# Patient Record
Sex: FEMALE | Race: WHITE | HISPANIC OR LATINO | Employment: FULL TIME | URBAN - METROPOLITAN AREA
[De-identification: names, ages, dates, MRNs, and addresses within clinical notes are randomized per-mention and may not be internally consistent; named-entity substitution may affect disease eponyms.]

---

## 2017-01-04 ENCOUNTER — TELEPHONE (OUTPATIENT)
Dept: MEDICAL GROUP | Facility: PHYSICIAN GROUP | Age: 47
End: 2017-01-04

## 2017-01-04 NOTE — TELEPHONE ENCOUNTER
Requested medical records regarding retinal exam from Dr Steven chopra. This was verbally requested on 1/4/2017 9:51 AM to: [phone]    Requested medical records regarding pap from Qiana Segovia PA-C. This was requested verbally on 1/4/2017 10:02 AM to: [phone]

## 2017-01-06 ENCOUNTER — HOSPITAL ENCOUNTER (OUTPATIENT)
Dept: LAB | Facility: MEDICAL CENTER | Age: 47
End: 2017-01-06
Attending: NURSE PRACTITIONER
Payer: COMMERCIAL

## 2017-01-06 DIAGNOSIS — R70.0 ELEVATED SED RATE: ICD-10-CM

## 2017-01-06 DIAGNOSIS — E11.9 CONTROLLED TYPE 2 DIABETES MELLITUS WITHOUT COMPLICATION, WITHOUT LONG-TERM CURRENT USE OF INSULIN (HCC): ICD-10-CM

## 2017-01-06 DIAGNOSIS — R68.2 DRY MOUTH: ICD-10-CM

## 2017-01-06 LAB
CREAT UR-MCNC: 204.9 MG/DL
CRP SERPL HS-MCNC: 0.84 MG/DL (ref 0–0.75)
ERYTHROCYTE [SEDIMENTATION RATE] IN BLOOD BY WESTERGREN METHOD: 33 MM/HOUR (ref 0–20)
MICROALBUMIN UR-MCNC: 0.9 MG/DL
MICROALBUMIN/CREAT UR: 4 MG/G (ref 0–30)

## 2017-01-06 PROCEDURE — 86038 ANTINUCLEAR ANTIBODIES: CPT

## 2017-01-06 PROCEDURE — 82570 ASSAY OF URINE CREATININE: CPT

## 2017-01-06 PROCEDURE — 82043 UR ALBUMIN QUANTITATIVE: CPT

## 2017-01-06 PROCEDURE — 86140 C-REACTIVE PROTEIN: CPT

## 2017-01-06 PROCEDURE — 86235 NUCLEAR ANTIGEN ANTIBODY: CPT

## 2017-01-06 PROCEDURE — 36415 COLL VENOUS BLD VENIPUNCTURE: CPT

## 2017-01-06 PROCEDURE — 85652 RBC SED RATE AUTOMATED: CPT

## 2017-01-06 PROCEDURE — 86225 DNA ANTIBODY NATIVE: CPT

## 2017-01-09 ENCOUNTER — OFFICE VISIT (OUTPATIENT)
Dept: MEDICAL GROUP | Facility: PHYSICIAN GROUP | Age: 47
End: 2017-01-09
Payer: COMMERCIAL

## 2017-01-09 VITALS
HEART RATE: 88 BPM | BODY MASS INDEX: 41.66 KG/M2 | HEIGHT: 64 IN | DIASTOLIC BLOOD PRESSURE: 74 MMHG | TEMPERATURE: 98.1 F | SYSTOLIC BLOOD PRESSURE: 112 MMHG | OXYGEN SATURATION: 96 % | WEIGHT: 244 LBS | RESPIRATION RATE: 14 BRPM

## 2017-01-09 DIAGNOSIS — E11.9 CONTROLLED TYPE 2 DIABETES MELLITUS WITHOUT COMPLICATION, WITHOUT LONG-TERM CURRENT USE OF INSULIN (HCC): ICD-10-CM

## 2017-01-09 DIAGNOSIS — E55.9 VITAMIN D DEFICIENCY: ICD-10-CM

## 2017-01-09 DIAGNOSIS — R79.82 ELEVATED C-REACTIVE PROTEIN (CRP): ICD-10-CM

## 2017-01-09 DIAGNOSIS — R70.0 ELEVATED SED RATE: ICD-10-CM

## 2017-01-09 PROCEDURE — 99214 OFFICE O/P EST MOD 30 MIN: CPT | Performed by: NURSE PRACTITIONER

## 2017-01-09 ASSESSMENT — PATIENT HEALTH QUESTIONNAIRE - PHQ9: CLINICAL INTERPRETATION OF PHQ2 SCORE: 0

## 2017-01-09 NOTE — PROGRESS NOTES
Subjective:     Chief Complaint   Patient presents with   • Diabetes   • Results     review labs         Radha Marlow is a 46 y.o. female here today for evaluation and management of:    Forgot to visit lab, got sick with URI again and felt better for a few days prior to visiting lab. crp and sed rate elevated, sed rate lower than previous level. + SUMMER, titer and reflex pending, call to lab - results for titer now included in reflex and results can take up to one week. Quite active but continues to have joint pain in knees, lower back. Follows with chiropractic.    Controlled type 2 diabetes mellitus without complication, without long-term current use of insulin (Formerly [facility])  A1c decreased to 5.8. Managed with metformin  mg 2 po bid.     Vitamin D deficiency   vitamin d supplement 5000 iu daily.         ROS   Denies HA, chest pain, shortness of breath, abdominal pain, bladder or bowel changes, lower extremity edema.    Current medicines (including changes today)  Current Outpatient Prescriptions   Medication Sig Dispense Refill   • ONE TOUCH ULTRA TEST strip USE 4 TIMES A DAY AND AS NEEDED FOR HIGH OR LOW BLOOD SUGAR  3   • atorvastatin (LIPITOR) 20 MG Tab Take 1 Tab by mouth every day. 90 Tab 1   • metformin ER (GLUCOPHAGE XR) 500 MG TABLET SR 24 HR   0   • Blood Glucose Monitoring Suppl Device Meter: Dispense insurance specific meter. Sig. Use as directed for blood sugar monitoring. #1. NR. DX: E11.9 1 Device 0   • Lancets Misc Lancets order: Lancets for insurance specific meter. Sig: use qid and prn ssx high or low sugar. #100 RF x 3 DX: E11.9 100 Each 3   • Blood Glucose Monitoring Suppl SUPPLIES Misc Test strips order: Test strips for insurance specific meter. Sig: use qid and prn ssx high or low sugar #100 RF x 3. DX: E11.9 100 Each 3   • Multiple Vitamin (DAILY VITAMINS PO) Take  by mouth.     • fluticasone (FLONASE) 50 MCG/ACT nasal spray Spray 1 Spray in nose 2 times a day. Each Nostril 1 Bottle 3  "  • guaifenesin-codeine (ROBITUSSIN AC) Solution oral solution Take 10 mL by mouth every 6 hours as needed for Cough. Drink plenty of water. Do not drink alcohol, drive, or operate machinery while taking. 180 mL 0   • BLISOVI FE 1/20 1-20 MG-MCG per tablet 1 Tab every day.  3   • Cholecalciferol (VITAMIN D) 2000 UNITS Cap Take  by mouth.       No current facility-administered medications for this visit.       She  has a past medical history of Hypertriglyceridemia (3/17/2010); Allergy; Anemia; Arthritis; and Hyperlipidemia. She also has no past medical history of Breast cancer (Formerly [facility]).    Allergies Bactrim and No known drug allergy    Current medications, problem list, allergies, past medical history, and tobacco use history reviewed in [employer] today.    Health maintenance reviewed and updated.    Objective:   Blood pressure 112/74, pulse 88, temperature 36.7 °C (98.1 °F), resp. rate 14, height 1.613 m (5' 3.5\"), weight 110.678 kg (244 lb), last menstrual period 12/19/2016, SpO2 96 %. Body mass index is 42.54 kg/(m^2).     Physical Exam   Constitutional: Alert, no acute distress. Pleasant and cooperative with the examination.  Skin:   Warm, dry, no rashes in visible areas.    Eyes:   Pupils equal, round. Conjunctiva and sclera clear,    Lids normal.  ENT:  Pinna normal.   Neck:   Supple, trachea midline.  Lungs:  Normal effort and respirations. Clear to auscultation bilaterally.  CV:  Regular rate and rhythm.  MS/Ext:  Steady gait, no edema.  Psych:  Eye contact is good, affect calm.    Assessment and Plan:   The following treatment plan was discussed    1. Controlled type 2 diabetes mellitus without complication, without long-term current use of insulin (Formerly [facility])  Stable. Continue current medicines. Monitor labs regularly.        2. Vitamin D deficiency  Continue supplement, will continue to monitor.    3. BMI 40.0-44.9, adult (Formerly [facility])  Counseled regarding lifestyle modifications.     4. Elevated sed rate  Less elevated now. " She'll visit lab to repeat the test when she is feeling well.  - WESTERGREN SED RATE; Future      5. Elevated C-reactive protein (CRP)  New. Repeat labs.  - CRP QUANTITIVE (NON-CARDIAC); Future  Followup: Return in about 1 month (around 2/9/2017).  As scheduled, sooner if symptoms don't resolve or with any new problems.       Reviewed side effects, risks, and benefits of medications prescribed today.  Advised to take all medications as instructed and report any side effects.   The patient voices understanding and agrees.  Report any new or worsening symptoms.  Have labs or other diagnostic studies prior to follow up.  Keep all appointments for any referrals given.      Please note this dictation was created using voice recognition software. Every reasonable attempt has been made to correct obvious errors, however there may be errors of grammar and possibly content that were not discovered before finalizing the note.

## 2017-01-09 NOTE — MR AVS SNAPSHOT
"Radha Marlow   2017 9:55 AM   Office Visit   MRN: [id number]    Department:  [facility]   Dept Phone:  [phone]    Description:  Female : 1970   Provider:  SAMY Roach           Reason for Visit     Diabetes     Results review labs       Allergies as of 2017     Allergen Noted Reactions    Bactrim [Sulfamethoxazole W-Trimethoprim] 2016   Unspecified    Rash     No Known Drug Allergy 2007         Vital Signs     Blood Pressure Pulse Temperature Respirations Height Weight    112/74 mmHg 88 36.7 °C (98.1 °F) 14 1.613 m (5' 3.5\") 110.678 kg (244 lb)    Body Mass Index Oxygen Saturation Last Menstrual Period Smoking Status          42.54 kg/m2 96% 2016 Former Smoker        Basic Information     Date Of Birth Sex Race Ethnicity Preferred Language    1970 Female White  Origin ([place]ian,[place]e,[place]ian,[place] citizen, etc) English      Problem List              ICD-10-CM Priority Class Noted - Resolved    Hypertriglyceridemia E78.1   3/17/2010 - Present    PCOS (polycystic ovarian syndrome) E28.2   2012 - Present    Hyperlipidemia associated with type 2 diabetes mellitus (HCC) E11.69, E78.5   3/1/2012 - Present    Controlled type 2 diabetes mellitus without complication, without long-term current use of insulin (HCC) E11.9   2016 - Present    Vitamin D deficiency E55.9   2016 - Present    BMI 40.0-44.9, adult (HCC) Z68.41   2016 - Present    Rosacea L71.9   2016 - Present      Health Maintenance        Date Due Completion Dates    IMM HEP B VACCINE (1 of 3 - Primary Series) 1970 ---    IMM PNEUMOCOCCAL 19-64 (ADULT) MEDIUM RISK SERIES (1 of 1 - PPSV23) 10/3/1990 ---    PAP SMEAR 2016, 2012    IMM INFLUENZA (1) 2016 ---    RETINAL SCREENING 2016    A1C SCREENING 2017 10/6/2016, 2016, 2016, 2015, 2014, 2013, 10/1/2012, 2012    MAMMOGRAM " 4/11/2017 4/11/2016, 3/18/2013, 3/9/2012, 6/15/2007, 6/15/2007    FASTING LIPID PROFILE 6/30/2017 6/30/2016, 4/11/2016, 4/6/2015, 11/1/2013, 4/2/2012, 2/23/2012, 3/9/2010    SERUM CREATININE 10/4/2017 10/4/2016, 8/31/2016, 8/29/2016, 6/30/2016, 4/11/2016, 4/6/2015, 1/31/2014, 11/1/2013, 4/2/2012, 2/23/2012, 3/9/2010    DIABETES MONOFILAMTENT / LE EXAM 10/6/2017 10/6/2016, 7/7/2016    URINE ACR / MICROALBUMIN 1/6/2018 1/6/2017, 6/30/2016, 2/23/2012    IMM DTaP/Tdap/Td Vaccine (2 - Td) 5/28/2024 5/28/2014            Current Immunizations     MMR Vaccine 7/29/2009    Tdap Vaccine 5/28/2014      Below and/or attached are the medications your provider expects you to take. Review all of your home medications and newly ordered medications with your provider and/or pharmacist. Follow medication instructions as directed by your provider and/or pharmacist. Please keep your medication list with you and share with your provider. Update the information when medications are discontinued, doses are changed, or new medications (including over-the-counter products) are added; and carry medication information at all times in the event of emergency situations     Allergies:  BACTRIM - Unspecified     NO KNOWN DRUG ALLERGY - (reactions not documented)               Medications  Valid as of: January 09, 2017 - 10:03 AM    Generic Name Brand Name Tablet Size Instructions for use    Atorvastatin Calcium (Tab) LIPITOR 20 MG Take 1 Tab by mouth every day.        Blood Glucose Monitoring Suppl (Device) Blood Glucose Monitoring Suppl  Meter: Dispense insurance specific meter. Sig. Use as directed for blood sugar monitoring. #1. NR. DX: E11.9        Blood Glucose Monitoring Suppl (Misc) Blood Glucose Monitoring Suppl SUPPLIES Test strips order: Test strips for insurance specific meter. Sig: use qid and prn ssx high or low sugar #100 RF x 3. DX: E11.9        Cholecalciferol (Cap) Vitamin D 2000 UNITS Take  by mouth.        Fluticasone  Propionate (Suspension) FLONASE 50 MCG/ACT Spray 1 Spray in nose 2 times a day. Each Nostril        Glucose Blood (Strip) ONE TOUCH ULTRA TEST  USE 4 TIMES A DAY AND AS NEEDED FOR HIGH OR LOW BLOOD SUGAR        Guaifenesin-Codeine (Solution) ROBITUSSIN -10 mg/5mL Take 10 mL by mouth every 6 hours as needed for Cough. Drink plenty of water. Do not drink alcohol, drive, or operate machinery while taking.        Lancets (Misc) Lancets  Lancets order: Lancets for insurance specific meter. Sig: use qid and prn ssx high or low sugar. #100 RF x 3 DX: E11.9        MetFORMIN HCl (TABLET SR 24 HR) GLUCOPHAGE  MG         Multiple Vitamin   Take  by mouth.        Norethin Ace-Eth Estrad-FE (Tab) BLISOVI FE 1/20 1-20 MG-MCG 1 Tab every day.        .                 Medicines prescribed today were sent to:     [facility]ACY #[id number] - [place] - [address]    [address] [place] [address]    Phone: [phone] Fax: [phone]    Open 24 Hours?: No      Medication refill instructions:       If your prescription bottle indicates you have medication refills left, it is not necessary to call your provider’s office. Please contact your pharmacy and they will refill your medication.    If your prescription bottle indicates you do not have any refills left, you may request refills at any time through one of the following ways: The online [employer] system (except Urgent Care), by calling your provider’s office, or by asking your pharmacy to contact your provider’s office with a refill request. Medication refills are processed only during regular business hours and may not be available until the next business day. Your provider may request additional information or to have a follow-up visit with you prior to refilling your medication.   *Please Note: Medication refills are assigned a new Rx number when refilled electronically. Your pharmacy may indicate that no refills were authorized even though a new prescription for the same  medication is available at the pharmacy. Please request the medicine by name with the pharmacy before contacting your provider for a refill.           MyChart Access Code: Activation code not generated  Current MyChart Status: Active

## 2017-01-10 LAB
DSDNA AB TITR SER CLIF: ABNORMAL {TITER}
ENA SM IGG SER-ACNC: 0 AU/ML (ref 0–40)
ENA SS-B IGG SER IA-ACNC: 0 AU/ML (ref 0–40)
NUCLEAR IGG SER QL IA: DETECTED
NUCLEAR IGG TITR SER IF: ABNORMAL {TITER}
SSA52 R0ENA AB IGG Q0420: 17 AU/ML (ref 0–40)
SSA60 R0ENA AB IGG Q0419: 1 AU/ML (ref 0–40)
U1 SNRNP IGG SER QL: 0 AU/ML (ref 0–40)

## 2017-01-11 DIAGNOSIS — E55.9 VITAMIN D DEFICIENCY: ICD-10-CM

## 2017-01-19 RX ORDER — ATORVASTATIN CALCIUM 20 MG/1
TABLET, FILM COATED ORAL
Qty: 90 TAB | Refills: 0 | Status: SHIPPED | OUTPATIENT
Start: 2017-01-19 | End: 2017-04-22 | Stop reason: SDUPTHER

## 2017-01-19 NOTE — TELEPHONE ENCOUNTER
Was the patient seen in the last year in this department? Yes     Does patient have an active prescription for medications requested? Yes     Received Request Via: Pharmacy      Pt met protocol?: Yes    LAST OV 01/09/17    LAST LIPID PROFILE 06/30/16    BP Readings from Last 1 Encounters:   01/09/17 112/74

## 2017-01-19 NOTE — TELEPHONE ENCOUNTER
Pt has had OV within the 12 month protocol and lipid panel is current from 6/16.3 month supply sent to pharmacy.   Lab Results   Component Value Date/Time    CHOLESTEROL, 06/30/2016 09:30 AM    LDL 71 06/30/2016 09:30 AM    HDL 44 06/30/2016 09:30 AM    TRIGLYCERIDES 156* 06/30/2016 09:30 AM       Lab Results   Component Value Date/Time    SODIUM 135 10/04/2016 10:56 AM    POTASSIUM 4.0 10/04/2016 10:56 AM    CHLORIDE 103 10/04/2016 10:56 AM    CO2 24 10/04/2016 10:56 AM    GLUCOSE 125* 10/04/2016 10:56 AM    BUN 15 10/04/2016 10:56 AM    CREATININE 0.75 10/04/2016 10:56 AM    BUN-CREATININE RATIO 23 03/09/2010 07:15 AM    GLOM FILT RATE, EST >59 03/09/2010 07:15 AM     Lab Results   Component Value Date/Time    ALKALINE PHOSPHATASE 47 10/04/2016 10:56 AM    AST(SGOT) 35 10/04/2016 10:56 AM    ALT(SGPT) 43 10/04/2016 10:56 AM    TOTAL BILIRUBIN 0.4 10/04/2016 10:56 AM

## 2017-01-26 ENCOUNTER — HOSPITAL ENCOUNTER (OUTPATIENT)
Dept: LAB | Facility: MEDICAL CENTER | Age: 47
End: 2017-01-26
Attending: NURSE PRACTITIONER
Payer: COMMERCIAL

## 2017-01-26 DIAGNOSIS — E55.9 VITAMIN D DEFICIENCY: ICD-10-CM

## 2017-01-26 DIAGNOSIS — R79.82 ELEVATED C-REACTIVE PROTEIN (CRP): ICD-10-CM

## 2017-01-26 DIAGNOSIS — R70.0 ELEVATED SED RATE: ICD-10-CM

## 2017-01-26 LAB
25(OH)D3 SERPL-MCNC: 13 NG/ML (ref 30–100)
CRP SERPL HS-MCNC: 0.72 MG/DL (ref 0–0.75)
ERYTHROCYTE [SEDIMENTATION RATE] IN BLOOD BY WESTERGREN METHOD: 38 MM/HOUR (ref 0–20)

## 2017-01-26 PROCEDURE — 82306 VITAMIN D 25 HYDROXY: CPT

## 2017-01-26 PROCEDURE — 36415 COLL VENOUS BLD VENIPUNCTURE: CPT

## 2017-01-26 PROCEDURE — 86140 C-REACTIVE PROTEIN: CPT

## 2017-01-26 PROCEDURE — 85652 RBC SED RATE AUTOMATED: CPT

## 2017-03-14 ENCOUNTER — OFFICE VISIT (OUTPATIENT)
Dept: MEDICAL GROUP | Facility: PHYSICIAN GROUP | Age: 47
End: 2017-03-14
Payer: COMMERCIAL

## 2017-03-14 VITALS
BODY MASS INDEX: 42.17 KG/M2 | DIASTOLIC BLOOD PRESSURE: 76 MMHG | RESPIRATION RATE: 16 BRPM | WEIGHT: 247 LBS | HEIGHT: 64 IN | HEART RATE: 80 BPM | TEMPERATURE: 98 F | SYSTOLIC BLOOD PRESSURE: 116 MMHG | OXYGEN SATURATION: 96 %

## 2017-03-14 DIAGNOSIS — E28.2 PCOS (POLYCYSTIC OVARIAN SYNDROME): ICD-10-CM

## 2017-03-14 DIAGNOSIS — R70.0 ELEVATED SED RATE: ICD-10-CM

## 2017-03-14 DIAGNOSIS — E11.9 CONTROLLED TYPE 2 DIABETES MELLITUS WITHOUT COMPLICATION, WITHOUT LONG-TERM CURRENT USE OF INSULIN (HCC): ICD-10-CM

## 2017-03-14 DIAGNOSIS — L71.9 ROSACEA: ICD-10-CM

## 2017-03-14 DIAGNOSIS — R76.8 POSITIVE ANA (ANTINUCLEAR ANTIBODY): ICD-10-CM

## 2017-03-14 DIAGNOSIS — E55.9 VITAMIN D DEFICIENCY: ICD-10-CM

## 2017-03-14 PROCEDURE — 99214 OFFICE O/P EST MOD 30 MIN: CPT | Performed by: NURSE PRACTITIONER

## 2017-03-14 RX ORDER — ERGOCALCIFEROL 1.25 MG/1
50000 CAPSULE ORAL
Qty: 12 CAP | Refills: 0 | Status: SHIPPED | OUTPATIENT
Start: 2017-03-14 | End: 2017-06-28

## 2017-03-14 NOTE — MR AVS SNAPSHOT
"        Radha Marlow   3/14/2017 9:15 AM   Office Visit   MRN: [id number]    Department:  [facility]   Dept Phone:  [phone]    Description:  Female : 1970   Provider:  SAMY Roach           Reason for Visit     Results review lab work       Allergies as of 3/14/2017     Allergen Noted Reactions    Bactrim [Sulfamethoxazole W-Trimethoprim] 2016   Unspecified    Rash     No Known Drug Allergy 2007         You were diagnosed with     Elevated sed rate   [185343]       Positive SUMMER (antinuclear antibody)   [316326]       PCOS (polycystic ovarian syndrome)   [202994]       Vitamin D deficiency   [5690920]         Vital Signs     Blood Pressure Pulse Temperature Respirations Height Weight    116/76 mmHg 80 36.7 °C (98 °F) 16 1.613 m (5' 3.5\") 112.038 kg (247 lb)    Body Mass Index Oxygen Saturation Last Menstrual Period Smoking Status          43.06 kg/m2 96% 2017 Former Smoker        Basic Information     Date Of Birth Sex Race Ethnicity Preferred Language    1970 Female White  Origin (Angolan,[place],Eth[place]ian,[place], etc) English      Problem List              ICD-10-CM Priority Class Noted - Resolved    Hypertriglyceridemia E78.1   3/17/2010 - Present    PCOS (polycystic ovarian syndrome) E28.2   2012 - Present    Hyperlipidemia associated with type 2 diabetes mellitus (CMS-HCC) E11.69, E78.5   3/1/2012 - Present    Controlled type 2 diabetes mellitus without complication, without long-term current use of insulin (CMS-HCC) E11.9   2016 - Present    Vitamin D deficiency E55.9   2016 - Present    BMI 40.0-44.9, adult (CMS-HCC) Z68.41   2016 - Present    Rosacea L71.9   2016 - Present      Health Maintenance        Date Due Completion Dates    IMM HEP B VACCINE (1 of 3 - Primary Series) 1970 ---    IMM PNEUMOCOCCAL 19-64 (ADULT) MEDIUM RISK SERIES (1 of 1 - PPSV23) 10/3/1990 ---    PAP SMEAR 2016, " 2/22/2012    IMM INFLUENZA (1) 9/1/2016 ---    RETINAL SCREENING 12/11/2016 12/11/2015    A1C SCREENING 4/6/2017 10/6/2016, 6/30/2016, 4/11/2016, 6/2/2015, 1/31/2014, 11/1/2013, 10/1/2012, 2/23/2012    MAMMOGRAM 4/11/2017 4/11/2016, 3/18/2013, 3/9/2012, 6/15/2007, 6/15/2007    FASTING LIPID PROFILE 6/30/2017 6/30/2016, 4/11/2016, 4/6/2015, 11/1/2013, 4/2/2012, 2/23/2012, 3/9/2010    SERUM CREATININE 10/4/2017 10/4/2016, 8/31/2016, 8/29/2016, 6/30/2016, 4/11/2016, 4/6/2015, 1/31/2014, 11/1/2013, 4/2/2012, 2/23/2012, 3/9/2010    DIABETES MONOFILAMENT / LE EXAM 10/6/2017 10/6/2016, 7/7/2016    URINE ACR / MICROALBUMIN 1/6/2018 1/6/2017, 6/30/2016, 2/23/2012    IMM DTaP/Tdap/Td Vaccine (2 - Td) 5/28/2024 5/28/2014            Current Immunizations     MMR Vaccine 7/29/2009    Tdap Vaccine 5/28/2014      Below and/or attached are the medications your provider expects you to take. Review all of your home medications and newly ordered medications with your provider and/or pharmacist. Follow medication instructions as directed by your provider and/or pharmacist. Please keep your medication list with you and share with your provider. Update the information when medications are discontinued, doses are changed, or new medications (including over-the-counter products) are added; and carry medication information at all times in the event of emergency situations     Allergies:  BACTRIM - Unspecified     NO KNOWN DRUG ALLERGY - (reactions not documented)               Medications  Valid as of: March 14, 2017 - 11:18 AM    Generic Name Brand Name Tablet Size Instructions for use    Atorvastatin Calcium (Tab) LIPITOR 20 MG TAKE 1 TAB BY MOUTH EVERY DAY.        Blood Glucose Monitoring Suppl (Device) Blood Glucose Monitoring Suppl  Meter: Dispense insurance specific meter. Sig. Use as directed for blood sugar monitoring. #1. NR. DX: E11.9        Blood Glucose Monitoring Suppl (Misc) Blood Glucose Monitoring Suppl SUPPLIES Test strips  order: Test strips for insurance specific meter. Sig: use qid and prn ssx high or low sugar #100 RF x 3. DX: E11.9        Cholecalciferol (Cap) Vitamin D 2000 UNITS Take  by mouth.        Ergocalciferol (Cap) DRISDOL 49888 UNITS Take 1 Cap by mouth every 7 days. Labs every three months, do not refill until after labs.        Fluticasone Propionate (Suspension) FLONASE 50 MCG/ACT Spray 1 Spray in nose 2 times a day. Each Nostril        Glucose Blood (Strip) ONE TOUCH ULTRA TEST  USE 4 TIMES A DAY AND AS NEEDED FOR HIGH OR LOW BLOOD SUGAR        Guaifenesin-Codeine (Solution) ROBITUSSIN -10 mg/5mL Take 10 mL by mouth every 6 hours as needed for Cough. Drink plenty of water. Do not drink alcohol, drive, or operate machinery while taking.        Lancets (Misc) Lancets  Lancets order: Lancets for insurance specific meter. Sig: use qid and prn ssx high or low sugar. #100 RF x 3 DX: E11.9        MetFORMIN HCl (TABLET SR 24 HR) GLUCOPHAGE  MG         Multiple Vitamin   Take  by mouth.        Norethin Ace-Eth Estrad-FE (Tab) BLISOVI FE 1/20 1-20 MG-MCG 1 Tab every day.        .                 Medicines prescribed today were sent to:     [facility]/PHARMACY #[id number] - [place] - [address]    [address] [place] [address]    Phone: [phone] Fax: [phone]    Open 24 Hours?: No      Medication refill instructions:       If your prescription bottle indicates you have medication refills left, it is not necessary to call your provider’s office. Please contact your pharmacy and they will refill your medication.    If your prescription bottle indicates you do not have any refills left, you may request refills at any time through one of the following ways: The online [employer] system (except Urgent Care), by calling your provider’s office, or by asking your pharmacy to contact your provider’s office with a refill request. Medication refills are processed only during regular business hours and may not be available until the  next business day. Your provider may request additional information or to have a follow-up visit with you prior to refilling your medication.   *Please Note: Medication refills are assigned a new Rx number when refilled electronically. Your pharmacy may indicate that no refills were authorized even though a new prescription for the same medication is available at the pharmacy. Please request the medicine by name with the pharmacy before contacting your provider for a refill.        Your To Do List     Future Labs/Procedures Complete By Expires    VITAMIN D,25 HYDROXY  6/12/2017 3/14/2018    SUMMER ANTIBODY WITH REFLEX  As directed 3/15/2018    SUMMER TITER  As directed 3/15/2018    Comments:    [facility] test code [id number]    CRP QUANTITIVE (NON-CARDIAC)  As directed 3/15/2018    WESTERGREN SED RATE  As directed 3/15/2018         MyChart Access Code: Activation code not generated  Current MONOQI Status: Active

## 2017-03-17 NOTE — PROGRESS NOTES
Subjective:     Chief Complaint   Patient presents with   • Results     review lab work         Radha Marlow is a 46 y.o. female here today for evaluation and management of:    Abnormal lab results:  + SUMMER titer 1:640 and intermittently elevated SED, CRP  SED. Initially discovered elevated SED In August while she was being treated with Bactrim by ophthalmologist for an eye infection. At that time her sedimentation rate was 63 and has been fluctuating since that time. More recently the SED level was measured at 33 in Early January Then increase to 38 later in January. CRP level was Slightly elevated at 0.84 in early January and decreased to 0.72. She continues to follow with gynecology for PC OS and dermatology for rosacea. Diabetes has been well controlled, A1c 5.8 in October.  Low vitamin D level at 13    ROS   Denies HA, chest pain, shortness of breath, abdominal pain, bladder or bowel changes, lower extremity edema.    Current medicines (including changes today)  Current Outpatient Prescriptions   Medication Sig Dispense Refill   • vitamin D, Ergocalciferol, (DRISDOL) 79676 UNITS Cap capsule Take 1 Cap by mouth every 7 days. Labs every three months, do not refill until after labs. 12 Cap 0   • atorvastatin (LIPITOR) 20 MG Tab TAKE 1 TAB BY MOUTH EVERY DAY. 90 Tab 0   • fluticasone (FLONASE) 50 MCG/ACT nasal spray Spray 1 Spray in nose 2 times a day. Each Nostril 1 Bottle 3   • ONE TOUCH ULTRA TEST strip USE 4 TIMES A DAY AND AS NEEDED FOR HIGH OR LOW BLOOD SUGAR  3   • BLISOVI FE 1/20 1-20 MG-MCG per tablet 1 Tab every day.  3   • Cholecalciferol (VITAMIN D) 2000 UNITS Cap Take  by mouth.     • metformin ER (GLUCOPHAGE XR) 500 MG TABLET SR 24 HR   0   • Blood Glucose Monitoring Suppl Device Meter: Dispense insurance specific meter. Sig. Use as directed for blood sugar monitoring. #1. NR. DX: E11.9 1 Device 0   • Lancets Misc Lancets order: Lancets for insurance specific meter. Sig: use qid and prn ssx  "high or low sugar. #100 RF x 3 DX: E11.9 100 Each 3   • Blood Glucose Monitoring Suppl SUPPLIES Misc Test strips order: Test strips for insurance specific meter. Sig: use qid and prn ssx high or low sugar #100 RF x 3. DX: E11.9 100 Each 3   • Multiple Vitamin (DAILY VITAMINS PO) Take  by mouth.       No current facility-administered medications for this visit.       She  has a past medical history of Hypertriglyceridemia (3/17/2010); Allergy; Anemia; Arthritis; and Hyperlipidemia. She also has no past medical history of Breast cancer (CMS-HCC).    Allergies Bactrim and No known drug allergy    Current medications, problem list, allergies, past medical history, and tobacco use history reviewed in [email or web address] today.    Health maintenance reviewed and updated.    Objective:   Blood pressure 116/76, pulse 80, temperature 36.7 °C (98 °F), resp. rate 16, height 1.613 m (5' 3.5\"), weight 112.038 kg (247 lb), last menstrual period 03/13/2017, SpO2 96 %. Body mass index is 43.06 kg/(m^2).     Physical Exam   Constitutional: Alert, no acute distress. Pleasant and cooperative with the examination.  Skin:   Warm, dry.    Eyes:   Pupils equal, round. Conjunctiva and sclera clear,    Lids normal.  ENT:  Pinna normal.  Neck:   Supple, trachea midline.  Lungs:  Normal effort and respirations. Clear to auscultation bilaterally.  CV:  Regular rate and rhythm.  MS/Ext:  Steady gait, no edema.  Psych:  Eye contact is good, affect calm.    Assessment and Plan:   The following treatment plan was discussed    1. Elevated APR - sed rate  Will repeat labs  - WESTERGREN SED RATE; Future  - CRP QUANTITIVE (NON-CARDIAC); Future    2. Positive SUMMER (antinuclear antibody)  Will repeat labs prior to referral to rheumatology.    - SUMMER ANTIBODY WITH REFLEX; Future    3. PCOS (polycystic ovarian syndrome)  Chronic. Continue current medications and follow up with specialist.    4. Vitamin D deficiency  New. Start weekly vitamin D supplement and repeat labs " in three months.  - VITAMIN D,25 HYDROXY; Future    5. Rosacea  Chronic. Continue follow-up with dermatology.    6. Diabetes   A1c ordered to be done with labs.  Followup: Return in about 3 months (around 6/14/2017).  As scheduled, sooner if symptoms don't resolve or with any new problems.           Reviewed side effects, risks, and benefits of medications prescribed today.  Advised to take all medications as instructed and report any side effects.   The patient voices understanding and agrees.  Report any new or worsening symptoms.  Have labs or other diagnostic studies prior to follow up.  Keep all appointments for any referrals given.      Please note this dictation was created using voice recognition software. Every reasonable attempt has been made to correct obvious errors, however there may be errors of grammar and possibly content that were not discovered before finalizing the note.

## 2017-04-22 DIAGNOSIS — E78.1 HYPERTRIGLYCERIDEMIA: ICD-10-CM

## 2017-04-24 RX ORDER — ATORVASTATIN CALCIUM 20 MG/1
TABLET, FILM COATED ORAL
Qty: 90 TAB | Refills: 0 | Status: SHIPPED | OUTPATIENT
Start: 2017-04-24 | End: 2017-08-19 | Stop reason: SDUPTHER

## 2017-04-24 NOTE — TELEPHONE ENCOUNTER
Please advise pt she is due for lipid labs as well as others that are pending from PCP. Ordered lipid and GFR.

## 2017-04-24 NOTE — TELEPHONE ENCOUNTER
Was the patient seen in the last year in this department? Yes     Does patient have an active prescription for medications requested? No     Received Request Via: Pharmacy      Pt met protocol?: Yes, LIPID LABS 4/16 OV 3/17

## 2017-05-04 ENCOUNTER — HOSPITAL ENCOUNTER (OUTPATIENT)
Dept: RADIOLOGY | Facility: MEDICAL CENTER | Age: 47
End: 2017-05-04
Attending: SPECIALIST
Payer: COMMERCIAL

## 2017-05-04 DIAGNOSIS — Z13.9 SCREENING: ICD-10-CM

## 2017-05-04 PROCEDURE — G0202 SCR MAMMO BI INCL CAD: HCPCS

## 2017-05-11 ENCOUNTER — SUPERVISING PHYSICIAN REVIEW (OUTPATIENT)
Dept: MEDICAL GROUP | Facility: PHYSICIAN GROUP | Age: 47
End: 2017-05-11

## 2017-05-11 NOTE — PROGRESS NOTES
I have reviewed and agree with history, assessment and plan for office encounter on 3/14/17 with midlevel provider: Brooke Parikh.  Face to face encounter/direct observation: No  Suggested changes to plan or follow-up: none   Angeles Mosquera D.O.

## 2017-06-08 RX ORDER — ERGOCALCIFEROL 1.25 MG/1
CAPSULE ORAL
Refills: 0 | OUTPATIENT
Start: 2017-06-08

## 2017-06-08 NOTE — TELEPHONE ENCOUNTER
Was the patient seen in the last year in this department? Yes     Does patient have an active prescription for medications requested? No     Received Request Via: Pharmacy      Pt met protocol?: Yes, OV 3/17, Vit D labs done 1/17.

## 2017-06-13 ENCOUNTER — HOSPITAL ENCOUNTER (OUTPATIENT)
Dept: LAB | Facility: MEDICAL CENTER | Age: 47
End: 2017-06-13
Attending: NURSE PRACTITIONER
Payer: COMMERCIAL

## 2017-06-13 DIAGNOSIS — E55.9 VITAMIN D DEFICIENCY: ICD-10-CM

## 2017-06-13 DIAGNOSIS — R76.8 POSITIVE ANA (ANTINUCLEAR ANTIBODY): ICD-10-CM

## 2017-06-13 DIAGNOSIS — R70.0 ELEVATED SED RATE: ICD-10-CM

## 2017-06-13 DIAGNOSIS — E78.1 HYPERTRIGLYCERIDEMIA: ICD-10-CM

## 2017-06-13 DIAGNOSIS — E11.9 CONTROLLED TYPE 2 DIABETES MELLITUS WITHOUT COMPLICATION, WITHOUT LONG-TERM CURRENT USE OF INSULIN (HCC): ICD-10-CM

## 2017-06-13 LAB
25(OH)D3 SERPL-MCNC: 48 NG/ML (ref 30–100)
CHOLEST SERPL-MCNC: 114 MG/DL (ref 100–199)
CRP SERPL HS-MCNC: 0.89 MG/DL (ref 0–0.75)
ERYTHROCYTE [SEDIMENTATION RATE] IN BLOOD BY WESTERGREN METHOD: 33 MM/HOUR (ref 0–20)
EST. AVERAGE GLUCOSE BLD GHB EST-MCNC: 140 MG/DL
HBA1C MFR BLD: 6.5 % (ref 0–5.6)
HDLC SERPL-MCNC: 39 MG/DL
LDLC SERPL CALC-MCNC: 46 MG/DL
TRIGL SERPL-MCNC: 143 MG/DL (ref 0–149)

## 2017-06-13 PROCEDURE — 86038 ANTINUCLEAR ANTIBODIES: CPT

## 2017-06-13 PROCEDURE — 36415 COLL VENOUS BLD VENIPUNCTURE: CPT

## 2017-06-13 PROCEDURE — 86140 C-REACTIVE PROTEIN: CPT

## 2017-06-13 PROCEDURE — 86235 NUCLEAR ANTIGEN ANTIBODY: CPT | Mod: 91

## 2017-06-13 PROCEDURE — 83036 HEMOGLOBIN GLYCOSYLATED A1C: CPT

## 2017-06-13 PROCEDURE — 85652 RBC SED RATE AUTOMATED: CPT

## 2017-06-13 PROCEDURE — 82306 VITAMIN D 25 HYDROXY: CPT

## 2017-06-13 PROCEDURE — 86225 DNA ANTIBODY NATIVE: CPT

## 2017-06-13 PROCEDURE — 80061 LIPID PANEL: CPT

## 2017-06-18 LAB
DSDNA AB TITR SER CLIF: ABNORMAL {TITER}
ENA SM IGG SER-ACNC: 0 AU/ML (ref 0–40)
ENA SS-B IGG SER IA-ACNC: 0 AU/ML (ref 0–40)
NUCLEAR IGG SER QL IA: DETECTED
NUCLEAR IGG TITR SER IF: ABNORMAL {TITER}
SSA52 R0ENA AB IGG Q0420: 8 AU/ML (ref 0–40)
SSA60 R0ENA AB IGG Q0419: 4 AU/ML (ref 0–40)
U1 SNRNP IGG SER QL: 0 AU/ML (ref 0–40)

## 2017-06-28 DIAGNOSIS — R76.8 ANA POSITIVE: ICD-10-CM

## 2017-07-05 ENCOUNTER — OFFICE VISIT (OUTPATIENT)
Dept: MEDICAL GROUP | Facility: PHYSICIAN GROUP | Age: 47
End: 2017-07-05
Payer: COMMERCIAL

## 2017-07-05 VITALS
OXYGEN SATURATION: 93 % | TEMPERATURE: 97.9 F | BODY MASS INDEX: 40.15 KG/M2 | SYSTOLIC BLOOD PRESSURE: 112 MMHG | HEART RATE: 93 BPM | DIASTOLIC BLOOD PRESSURE: 82 MMHG | HEIGHT: 65 IN | WEIGHT: 241 LBS

## 2017-07-05 DIAGNOSIS — E11.9 CONTROLLED TYPE 2 DIABETES MELLITUS WITHOUT COMPLICATION, WITHOUT LONG-TERM CURRENT USE OF INSULIN (HCC): ICD-10-CM

## 2017-07-05 DIAGNOSIS — R79.82 ELEVATED C-REACTIVE PROTEIN (CRP): ICD-10-CM

## 2017-07-05 DIAGNOSIS — R70.0 ELEVATED SED RATE: ICD-10-CM

## 2017-07-05 DIAGNOSIS — F41.8 ANXIETY ABOUT HEALTH: ICD-10-CM

## 2017-07-05 DIAGNOSIS — R76.8 ANA POSITIVE: ICD-10-CM

## 2017-07-05 PROCEDURE — 99215 OFFICE O/P EST HI 40 MIN: CPT | Performed by: NURSE PRACTITIONER

## 2017-07-05 NOTE — MR AVS SNAPSHOT
"Radha Marlow   2017 3:35 PM   Office Visit   MRN: [id number]    Department:  [facility]   Dept Phone:  [phone]    Description:  Female : 1970   Provider:  SAMY Roach           Reason for Visit     Follow-Up lab review      Allergies as of 2017     Allergen Noted Reactions    Bactrim [Sulfamethoxazole W-Trimethoprim] 2016   Unspecified    Rash     No Known Drug Allergy 2007         Vital Signs     Blood Pressure Pulse Temperature Height Weight Body Mass Index    112/82 mmHg 93 36.6 °C (97.9 °F) 1.638 m (5' 4.5\") 109.317 kg (241 lb) 40.74 kg/m2    Oxygen Saturation Smoking Status                93% Former Smoker          Basic Information     Date Of Birth Sex Race Ethnicity Preferred Language    1970 Female White  Origin (Senegalese,Malagasy,Vietnamese,Pakistani, etc) English      Problem List              ICD-10-CM Priority Class Noted - Resolved    Hypertriglyceridemia E78.1   3/17/2010 - Present    PCOS (polycystic ovarian syndrome) E28.2   2012 - Present    Hyperlipidemia associated with type 2 diabetes mellitus (CMS-HCC) E11.69, E78.5   3/1/2012 - Present    Controlled type 2 diabetes mellitus without complication, without long-term current use of insulin (CMS-HCC) E11.9   2016 - Present    Vitamin D deficiency E55.9   2016 - Present    BMI 40.0-44.9, adult (CMS-HCC) Z68.41   2016 - Present    Rosacea L71.9   2016 - Present      Health Maintenance        Date Due Completion Dates    IMM HEP B VACCINE (1 of 3 - Primary Series) 1970 ---    IMM PNEUMOCOCCAL 19-64 (ADULT) MEDIUM RISK SERIES (1 of 1 - PPSV23) 10/3/1990 ---    PAP SMEAR 2016, 2012    RETINAL SCREENING 2016    IMM INFLUENZA (1) 2017 ---    SERUM CREATININE 10/4/2017 10/4/2016, 2016, 2016, 2016, 2016, 2015, 2014, 2013, 2012, 2012, 3/9/2010    DIABETES MONOFILAMENT / " LE EXAM 10/6/2017 10/6/2016, 7/7/2016    A1C SCREENING 12/13/2017 6/13/2017, 10/6/2016, 6/30/2016, 4/11/2016, 6/2/2015, 1/31/2014, 11/1/2013, 10/1/2012, 2/23/2012    URINE ACR / MICROALBUMIN 1/6/2018 1/6/2017, 6/30/2016, 4/6/2015, 10/1/2012, 2/23/2012    MAMMOGRAM 5/4/2018 5/4/2017, 4/11/2016, 3/18/2013, 3/9/2012, 6/15/2007, 6/15/2007    FASTING LIPID PROFILE 6/13/2018 6/13/2017, 6/30/2016, 4/11/2016, 4/6/2015, 11/1/2013, 4/2/2012, 2/23/2012, 3/9/2010    IMM DTaP/Tdap/Td Vaccine (2 - Td) 5/28/2024 5/28/2014            Current Immunizations     MMR Vaccine 7/29/2009    Tdap Vaccine 5/28/2014      Below and/or attached are the medications your provider expects you to take. Review all of your home medications and newly ordered medications with your provider and/or pharmacist. Follow medication instructions as directed by your provider and/or pharmacist. Please keep your medication list with you and share with your provider. Update the information when medications are discontinued, doses are changed, or new medications (including over-the-counter products) are added; and carry medication information at all times in the event of emergency situations     Allergies:  BACTRIM - Unspecified     NO KNOWN DRUG ALLERGY - (reactions not documented)               Medications  Valid as of: July 05, 2017 -  5:21 PM    Generic Name Brand Name Tablet Size Instructions for use    Atorvastatin Calcium (Tab) LIPITOR 20 MG TAKE 1 TAB BY MOUTH EVERY DAY.        Blood Glucose Monitoring Suppl (Device) Blood Glucose Monitoring Suppl  Meter: Dispense insurance specific meter. Sig. Use as directed for blood sugar monitoring. #1. NR. DX: E11.9        Blood Glucose Monitoring Suppl (Misc) Blood Glucose Monitoring Suppl SUPPLIES Test strips order: Test strips for insurance specific meter. Sig: use qid and prn ssx high or low sugar #100 RF x 3. DX: E11.9        Cholecalciferol (Cap) Vitamin D 2000 UNITS Take  by mouth.        Doxycycline   Take   by mouth.        Fluticasone Propionate (Suspension) FLONASE 50 MCG/ACT Spray 1 Spray in nose 2 times a day. Each Nostril        Glucose Blood (Strip) ONE TOUCH ULTRA TEST  USE 4 TIMES A DAY AND AS NEEDED FOR HIGH OR LOW BLOOD SUGAR        Lancets (Misc) Lancets  Lancets order: Lancets for insurance specific meter. Sig: use qid and prn ssx high or low sugar. #100 RF x 3 DX: E11.9        MetFORMIN HCl (TABLET SR 24 HR) GLUCOPHAGE  MG         MetroNIDAZOLE (Cream) METROCREAM 0.75 % Apply  to affected area(s) 2 times a day.        Multiple Vitamin   Take  by mouth.        Norethin Ace-Eth Estrad-FE (Tab) BLISOVI FE 1/20 1-20 MG-MCG 1 Tab every day.        .                 Medicines prescribed today were sent to:     [id number] - [place] - [address] [place] [address]    Phone: [phone] Fax: [phone]    Open 24 Hours?: No      Medication refill instructions:       If your prescription bottle indicates you have medication refills left, it is not necessary to call your provider’s office. Please contact your pharmacy and they will refill your medication.    If your prescription bottle indicates you do not have any refills left, you may request refills at any time through one of the following ways: The online Mailcloud system (except Urgent Care), by calling your provider’s office, or by asking your pharmacy to contact your provider’s office with a refill request. Medication refills are processed only during regular business hours and may not be available until the next business day. Your provider may request additional information or to have a follow-up visit with you prior to refilling your medication.   *Please Note: Medication refills are assigned a new Rx number when refilled electronically. Your pharmacy may indicate that no refills were authorized even though a new prescription for the same medication is available at the pharmacy. Please request the medicine by name with the  pharmacy before contacting your provider for a refill.           MyChart Access Code: Activation code not generated  Current MyChart Status: Active

## 2017-07-07 DIAGNOSIS — R76.8 ANA POSITIVE: ICD-10-CM

## 2017-07-08 NOTE — PROGRESS NOTES
Subjective:     Chief Complaint   Patient presents with   • Follow-Up     lab review        Radha Marlow is a 46 y.o. female here today for evaluation and management of:    Lab Results   Component Value Date/Time    GLYCOHEMOGLOBIN 6.5* 06/13/2017 11:28 AM    GLYCOHEMOGLOBIN 5.8 10/06/2016 09:45 AM      Continues all medications for DM, hasn't been eating as well and is not happy about increase in A1c. She has lost 6 pounds since last visit.     Reviewed lab results, she was referred to rheumatology but has not been notified regarding appointment. She is concerned about her health and abnormal lab results.    ROS   Denies fever, chills, HA, chest pain, shortness of breath, abdominal pain, bladder or bowel changes, lower extremity edema.    Current medicines (including changes today)  Current Outpatient Prescriptions   Medication Sig Dispense Refill   • DOXYCYCLINE PO Take  by mouth.     • metronidazole (METROCREAM) 0.75 % cream Apply  to affected area(s) 2 times a day.     • atorvastatin (LIPITOR) 20 MG Tab TAKE 1 TAB BY MOUTH EVERY DAY. 90 Tab 0   • fluticasone (FLONASE) 50 MCG/ACT nasal spray Spray 1 Spray in nose 2 times a day. Each Nostril 1 Bottle 3   • ONE TOUCH ULTRA TEST strip USE 4 TIMES A DAY AND AS NEEDED FOR HIGH OR LOW BLOOD SUGAR  3   • BLISOVI FE 1/20 1-20 MG-MCG per tablet 1 Tab every day.  3   • Cholecalciferol (VITAMIN D) 2000 UNITS Cap Take  by mouth.     • metformin ER (GLUCOPHAGE XR) 500 MG TABLET SR 24 HR   0   • Blood Glucose Monitoring Suppl Device Meter: Dispense insurance specific meter. Sig. Use as directed for blood sugar monitoring. #1. NR. DX: E11.9 1 Device 0   • Lancets Misc Lancets order: Lancets for insurance specific meter. Sig: use qid and prn ssx high or low sugar. #100 RF x 3 DX: E11.9 100 Each 3   • Blood Glucose Monitoring Suppl SUPPLIES Misc Test strips order: Test strips for insurance specific meter. Sig: use qid and prn ssx high or low sugar #100 RF x 3. DX: E11.9  "100 Each 3   • Multiple Vitamin (DAILY VITAMINS PO) Take  by mouth.       No current facility-administered medications for this visit.       She  has a past medical history of Hypertriglyceridemia (3/17/2010); Allergy; Anemia; Arthritis; and Hyperlipidemia. She also has no past medical history of Breast cancer (CMS-HCC).    Allergies Bactrim and No known drug allergy    Current medications, problem list, allergies, past medical history, and tobacco use history reviewed in [facility] today.    Health maintenance reviewed and updated.    Objective:   Blood pressure 112/82, pulse 93, temperature 36.6 °C (97.9 °F), height 1.638 m (5' 4.5\"), weight 109.317 kg (241 lb), SpO2 93 %. Body mass index is 40.74 kg/(m^2).     Physical Exam   Constitutional: Alert, no acute distress. Pleasant and cooperative with the examination.  Skin:   Warm, dry, no rashes in visible areas.    Eyes:   Pupils equal, round. Conjunctiva and sclera clear,    Lids normal.  ENT:  Pinna normal.   Neck:   Supple, trachea midline.  Lungs:  Normal effort and respirations. Clear to auscultation bilaterally.  CV:  Regular rate and rhythm.  MS/Ext:  Steady gait, no edema.  Psych:  Eye contact is good, affect calm, teary at times.    Assessment and Plan:   The following treatment plan was discussed    1. SUMMER positive     2. Elevated sed rate     3. Elevated C-reactive protein (CRP)     4. Anxiety about health      counseled regarding health and management of stress   5. Controlled type 2 diabetes mellitus without complication, without long-term current use of insulin (CMS-HCC)      Stable, continue current medications, monitoring, and office evaluation. will continue to monitor.    6. BMI 40.0-44.9, adult (CMS-HCC)      counseled regarding lifestyle modifications.      Differential diagnosis, natural history, treatment options, supportive care, and indications for immediate follow-up discussed at length.   She will call to schedule appt with " rheumatology.    Followup: 3 months,   As scheduled, sooner if symptoms don't resolve or with any new problems.       Patient was seen for 40 minutes, more than 50% of time spent in face to face review, consultation, counseling, and arranging future evaluation and follow up of medical conditions and care.     Reviewed side effects, risks, and benefits of medications prescribed today.  Advised to take all medications as instructed and report any side effects.   The patient voices understanding and agrees.  Report any new or worsening symptoms.  Have labs or other diagnostic studies prior to follow up.  Keep all appointments for any referrals given.      Please note this dictation was created using voice recognition software. Every reasonable attempt has been made to correct obvious errors, however there may be errors of grammar and possibly content that were not discovered before finalizing the note.

## 2017-07-10 ENCOUNTER — HOSPITAL ENCOUNTER (OUTPATIENT)
Dept: LAB | Facility: MEDICAL CENTER | Age: 47
End: 2017-07-10
Attending: NURSE PRACTITIONER
Payer: COMMERCIAL

## 2017-07-10 DIAGNOSIS — E78.1 HYPERTRIGLYCERIDEMIA: ICD-10-CM

## 2017-07-10 DIAGNOSIS — R76.8 ANA POSITIVE: ICD-10-CM

## 2017-07-10 LAB
C3 SERPL-MCNC: 174 MG/DL (ref 87–200)
C4 SERPL-MCNC: 33 MG/DL (ref 19–52)
CREAT SERPL-MCNC: 0.67 MG/DL (ref 0.5–1.4)
GFR SERPL CREATININE-BSD FRML MDRD: >60 ML/MIN/1.73 M 2

## 2017-07-10 PROCEDURE — 86225 DNA ANTIBODY NATIVE: CPT

## 2017-07-10 PROCEDURE — 86160 COMPLEMENT ANTIGEN: CPT

## 2017-07-10 PROCEDURE — 36415 COLL VENOUS BLD VENIPUNCTURE: CPT

## 2017-07-10 PROCEDURE — 82565 ASSAY OF CREATININE: CPT

## 2017-07-10 PROCEDURE — 86235 NUCLEAR ANTIGEN ANTIBODY: CPT | Mod: 91

## 2017-07-10 PROCEDURE — 86162 COMPLEMENT TOTAL (CH50): CPT

## 2017-07-12 LAB
CH50 SERPL-ACNC: 185 CAE UNITS (ref 60–144)
DSDNA AB TITR SER CLIF: NORMAL {TITER}
ENA SM IGG SER-ACNC: 0 AU/ML (ref 0–40)
SSA52 R0ENA AB IGG Q0420: 4 AU/ML (ref 0–40)
SSA60 R0ENA AB IGG Q0419: 1 AU/ML (ref 0–40)

## 2017-07-12 RX ORDER — METFORMIN HYDROCHLORIDE 500 MG/1
1000 TABLET, EXTENDED RELEASE ORAL 2 TIMES DAILY
Qty: 120 TAB | Refills: 5 | Status: SHIPPED | OUTPATIENT
Start: 2017-07-12 | End: 2017-11-13 | Stop reason: SDUPTHER

## 2017-07-12 NOTE — TELEPHONE ENCOUNTER
Requested Prescriptions     Signed Prescriptions Disp Refills   • metformin ER (GLUCOPHAGE XR) 500 MG TABLET SR 24  Tab 5     Sig: Take 2 Tabs by mouth 2 times a day.     Authorizing Provider: IVON GAITAN     RX sent to pharmacy.  GABRIELE Roach.

## 2017-08-02 ENCOUNTER — OFFICE VISIT (OUTPATIENT)
Dept: RHEUMATOLOGY | Facility: PHYSICIAN GROUP | Age: 47
End: 2017-08-02
Payer: COMMERCIAL

## 2017-08-02 VITALS
WEIGHT: 244 LBS | SYSTOLIC BLOOD PRESSURE: 108 MMHG | OXYGEN SATURATION: 94 % | DIASTOLIC BLOOD PRESSURE: 74 MMHG | HEART RATE: 88 BPM | RESPIRATION RATE: 12 BRPM | HEIGHT: 64 IN | BODY MASS INDEX: 41.66 KG/M2 | TEMPERATURE: 97.6 F

## 2017-08-02 DIAGNOSIS — R70.0 ESR RAISED: ICD-10-CM

## 2017-08-02 DIAGNOSIS — R76.8 POSITIVE ANA (ANTINUCLEAR ANTIBODY): ICD-10-CM

## 2017-08-02 PROCEDURE — 99244 OFF/OP CNSLTJ NEW/EST MOD 40: CPT | Performed by: INTERNAL MEDICINE

## 2017-08-02 RX ORDER — IBUPROFEN 200 MG
200 TABLET ORAL EVERY 6 HOURS PRN
COMMUNITY
End: 2020-02-06

## 2017-08-02 NOTE — PROGRESS NOTES
Chief Complaint- abnormal bloodwork    Chief Complaint   Patient presents with   • New Patient     new patient      Radha Jaramillo is a 46 y.o. female here as a new Rheumatology Consult referred by SAMY Roach for consultation regarding referral for positive SUMMER 1:5120    HPI:     Ms. Radha Jaramillo presents with a history of positive SUMMER, DM    In 2014 started her career and for the last 2.5 years has had a busy career with work and schooling.  She reports that he has had fatigue, weight pain.  On check her PCP was concern for a persistently elevated ESR that lead to evaluation for SUMMER.  She is here for evaluation.  She reports 5 minutes of morning stiffness.  She denies joint swelling.  She denies to photosensitivity but mild rash on anterior chest.    No swollen glands.  She does note hair thinning since age 19.  She denies sicca symptoms or Raynauds.  She denies eye inflammation,  She denies sinus trouble .  She denies tinnitus or dysphagia.    She denies chest pain or shortness of breath.  She denies oral ulcers.  She denies chest pain, shortness of breath, palpitations or cough.    She does admit to night sweats that started since her first child where she would feel cold with rigors but she on touch she is hot.  She reports hyperhidrosis and there are night when she will sweat all night.  She denies joint pains other than intermittent pain in her knees.  No constipation no loss of voice.  No numbness or tingling. No rashes.   No nosebleeds.  She has pain in the thoracic region and occassionally her lower back hurts and midback.    She does get diarrhea due to her Metformin      Results for RADHA JARAMILLO (MRN [id number]) as of 2017 15:20   Ref. Range 2017 11:28 7/10/2017 09:15   C3 Complement Latest Ref Range: 87.0-200.0 mg/dL  174.0   Complement C4 Latest Ref Range: 19.0-52.0 mg/dL  33.0   Complement Total Hemolytic Latest Ref Range:   Units  185 (H)   Stat  C-Reactive Protein Latest Ref Range: 0.00-0.75 mg/dL 0.89 (H)    Anti-Dna -Ds Latest Ref Range: None Detected  None Detected None Detected   Antinuclear Antibody Latest Ref Range: None Detected  Detected (A)    Rnp Antibodies Latest Ref Range: 0-40 AU/mL 0    Smith Antibodies Latest Ref Range: 0-40 AU/mL 0 0   SSA 52 (R0)(ERVIN) Ab, IgG Latest Ref Range: 0-40 AU/mL 8 4   SSA 60 (R0)(ERVIN) Ab, IgG Latest Ref Range: 0-40 AU/mL 4 1   Sjogren'S Anti-Ss-B Latest Ref Range: 0-40 AU/mL 0    SUMMER Titer Latest Ref Range: <1:40  1:5120 (H)      Results for AUBREY JARAMILLO (MRN [id number]) as of 8/2/2017 15:25   Ref. Range 10/1/2012 11:58 4/6/2015 07:03   Microsomal -Tpo- Abs Latest Ref Range: 0.0-9.0 IU/mL 0.4 0.4   Anti-Thyroglobulin Latest Ref Range: 0.0-4.0 IU/mL <0.9 <0.9   Gliadin Antibodies Iga Latest Ref Range: 0-19 Units 9        Esr was persistently elevated    Past Medical HIstory: DM, infertility (at age 16 she experienced amenorrhea and diagnosed with PCOS managed with birth control), no miscarriages, gestational diabetes with 3 kids, premature births (@ 33 weeks, @ 37 weeks, @ 38 weeks).        Social History: not a current smoker, no alcohol, school as a , [employer]      Family History: son has very dry eyes ad has chronic hives, heart stents, DM,     Current medicines (including changes today)  Current Outpatient Prescriptions   Medication Sig Dispense Refill   • ibuprofen (MOTRIN) 200 MG Tab Take 200 mg by mouth every 6 hours as needed.     • metformin ER (GLUCOPHAGE XR) 500 MG TABLET SR 24 HR Take 2 Tabs by mouth 2 times a day. 120 Tab 5   • DOXYCYCLINE PO Take  by mouth.     • metronidazole (METROCREAM) 0.75 % cream Apply  to affected area(s) 2 times a day.     • atorvastatin (LIPITOR) 20 MG Tab TAKE 1 TAB BY MOUTH EVERY DAY. 90 Tab 0   • ONE TOUCH ULTRA TEST strip USE 4 TIMES A DAY AND AS NEEDED FOR HIGH OR LOW BLOOD SUGAR  3   • Blood Glucose Monitoring Suppl Device Meter: Dispense insurance  specific meter. Sig. Use as directed for blood sugar monitoring. #1. NR. DX: E11.9 1 Device 0   • Lancets Misc Lancets order: Lancets for insurance specific meter. Sig: use qid and prn ssx high or low sugar. #100 RF x 3 DX: E11.9 100 Each 3   • Blood Glucose Monitoring Suppl SUPPLIES Misc Test strips order: Test strips for insurance specific meter. Sig: use qid and prn ssx high or low sugar #100 RF x 3. DX: E11.9 100 Each 3   • Multiple Vitamin (DAILY VITAMINS PO) Take  by mouth.     • fluticasone (FLONASE) 50 MCG/ACT nasal spray Spray 1 Spray in nose 2 times a day. Each Nostril 1 Bottle 3   • BLISOVI FE 1/20 1-20 MG-MCG per tablet 1 Tab every day.  3   • Cholecalciferol (VITAMIN D) 2000 UNITS Cap Take  by mouth.       No current facility-administered medications for this visit.     She  has a past medical history of Hypertriglyceridemia (3/17/2010); Allergy; Anemia; Arthritis; and Hyperlipidemia. She also has no past medical history of Breast cancer (CMS-[facility]).  She  has no past surgical history on file.  Family History   Problem Relation Age of Onset   • Diabetes Mother    • Heart Disease Father    • Hypertension Father    • Hyperlipidemia Father    • Diabetes Brother    • Heart Disease Brother    • Hypertension Brother    • Hyperlipidemia Brother    • Diabetes Maternal Aunt    • Diabetes Maternal Uncle    • Cancer Maternal Grandmother      Cervical/ovarian   • Diabetes Maternal Grandfather    • Heart Disease Maternal Grandfather    • Hypertension Maternal Grandfather    • Hyperlipidemia Maternal Grandfather    • Stroke Maternal Grandfather    • Stroke Paternal Grandmother    • Hyperlipidemia Paternal Grandmother    • Hypertension Paternal Grandmother    • Heart Disease Paternal Grandmother    • Heart Disease Paternal Grandfather    • Hypertension Paternal Grandfather    • Hyperlipidemia Paternal Grandfather      Family Status   Relation Status Death Age   • Mother Alive    • Father Alive    • Sister Alive    •  "Brother Alive    • Maternal Aunt Alive    • Maternal Uncle Alive    • Paternal Aunt Alive    • Paternal Uncle Alive    • Maternal Grandmother     • Maternal Grandfather     • Paternal Grandmother Alive    • Paternal Grandfather Alive    • Daughter Alive    • Son Alive    • Son Alive    • Son Alive      Social History   Substance Use Topics   • Smoking status: Former Smoker -- 0.25 packs/day for 1 years     Types: Cigarettes     Quit date: 1991   • Smokeless tobacco: Never Used   • Alcohol Use: No      Comment: [place] prohibits     Social History     Social History Narrative         ROS  Constitutional ROS: No weakness, fatigue and night sweats  Eye ROS: No eye pain, redness, discharge  Ear ROS: No recent change in hearing  Mouth/Throat ROS: No recent change in voice or hoarseness  Neck ROS: No significant pain in neck  Pulmonary ROS: No shortness of breath, No recent change in breathing  Cardiovascular ROS: No chest pain, No shortness of breath  Gastrointestinal ROS: No abdominal pain, does get diarrhea  Musculoskeletal/Extremities ROS: No cyanosis  Hematologic/Lymphatic ROS: No coagulation disorder  Skin/Integumentary ROS: color, texture and temperature normal  Neurologic ROS: Normal development       Objective:     Blood pressure 108/74, pulse 88, temperature 36.4 °C (97.6 °F), resp. rate 12, height 1.626 m (5' 4.02\"), weight 110.678 kg (244 lb), last menstrual period 2017, SpO2 94 %, not currently breastfeeding. Body mass index is 41.86 kg/(m^2).  Physical Exam:    Filed Vitals:    17 1501   BP: 108/74   Pulse: 88   Temp: 36.4 °C (97.6 °F)   Resp: 12   Height: 1.626 m (5' 4.02\")   Weight: 110.678 kg (244 lb)   SpO2: 94%    Body mass index is 41.86 kg/(m^2).    General/Constitutional: NAD not diaphoretic, comfortable  Eyes: clear conjunctiva, no scleral icterus, EOMI, PERRL  Ears, Nose, Mouth,Throat: no oral ulcers, good dentition, moist mucous membranes, no discharge from ears " bilaterally  Cardiovascular: regular rate and rhythm.  No murmurs, gallops, rubs  Respiratory: normal effort, unlabored respiration.  On auscultation no wheezes, rales, rhonchi.  Clear to auscultation.  GI: soft, NTTP no hepatosplenomegaly, nondistended.  There is mild tenderness no guarding in the RUQ  Musculoskeletal  Axial:  Thoracic: no paraspinal tenderness  Lumbar: no paraspinal tenderness  Upper Extremities:  No synovitis of the PIP, DIP, MCP  Wrists and Elbows have good ROM  Muscle Strength: 5/5 in deltoids, biceps, triceps, finger , wrists extension bilateral  Lower Extremities:  No knee effusion bilateral, No crepitus bilateral  No MTP tenderness bilateral  Muscle Strength: 5/5 in dorsiflexion, plantarflexion, knee extension, knee flexion, and hip flexion bilateral  Gait is normal  Skin: diffuse hair loss; Limited skin exam.  no rashes, no digital ulcerations, no alopecia, no tophi, no skin thickening, no nodules  Neuro: CN II-XII grossly intact, Alert, Oriented x 3, moves all four extremities  Psych: normal affect, normal mood, judgement appropriate, follows commands, responses are appropritae  Heme/Lymph: no cervical adenopathy; no thyromegaly        No results found for: QNTTBGOLD  No results found for: HEPBCORTOT, HEPBCORIGM, HEPBSAG  No results found for: HEPCAB  Lab Results   Component Value Date/Time    SODIUM 135 10/04/2016 10:56 AM    POTASSIUM 4.0 10/04/2016 10:56 AM    CHLORIDE 103 10/04/2016 10:56 AM    CO2 24 10/04/2016 10:56 AM    GLUCOSE 125* 10/04/2016 10:56 AM    BUN 15 10/04/2016 10:56 AM    CREATININE 0.67 07/10/2017 09:15 AM    BUN-CREATININE RATIO 23 03/09/2010 07:15 AM    GLOM FILT RATE, EST >59 03/09/2010 07:15 AM      Lab Results   Component Value Date/Time    WBC 6.1 10/04/2016 10:56 AM    RBC 4.76 10/04/2016 10:56 AM    HEMOGLOBIN 14.6 10/04/2016 10:56 AM    HEMATOCRIT 44.1 10/04/2016 10:56 AM    MCV 92.6 10/04/2016 10:56 AM    MCH 30.7 10/04/2016 10:56 AM    MCHC 33.1*  10/04/2016 10:56 AM    MPV 10.2 10/04/2016 10:56 AM    NEUTROPHILS-POLYS 60.10 10/04/2016 10:56 AM    LYMPHOCYTES 28.80 10/04/2016 10:56 AM    MONOCYTES 8.70 10/04/2016 10:56 AM    EOSINOPHILS 1.30 10/04/2016 10:56 AM    BASOPHILS 0.80 10/04/2016 10:56 AM      Lab Results   Component Value Date/Time    CALCIUM 9.8 10/04/2016 10:56 AM    AST(SGOT) 35 10/04/2016 10:56 AM    ALT(SGPT) 43 10/04/2016 10:56 AM    ALKALINE PHOSPHATASE 47 10/04/2016 10:56 AM    TOTAL BILIRUBIN 0.4 10/04/2016 10:56 AM    ALBUMIN 4.2 10/04/2016 10:56 AM    TOTAL PROTEIN 8.1 10/04/2016 10:56 AM     Lab Results   Component Value Date/Time    ANTINUCLEAR ANTIBODY Detected* 06/13/2017 11:28 AM     Lab Results   Component Value Date/Time    SED RATE WESTERGREN 33* 06/13/2017 11:28 AM    STAT C-REACTIVE PROTEIN 0.89* 06/13/2017 11:28 AM     No results found for: DELMI ROSEVTINTERP  Lab Results   Component Value Date/Time    C3 COMPLEMENT 174.0 07/10/2017 09:15 AM    COMPLEMENT C4 33.0 07/10/2017 09:15 AM     Lab Results   Component Value Date/Time    ANTI-DNA -DS None Detected 07/10/2017 09:15 AM    RNP ANTIBODIES 0 06/13/2017 11:28 AM    SMITH ANTIBODIES 0 07/10/2017 09:15 AM     Lab Results   Component Value Date/Time    ANTI-DNA -DS None Detected 07/10/2017 09:15 AM    C3 COMPLEMENT 174.0 07/10/2017 09:15 AM    RNP ANTIBODIES 0 06/13/2017 11:28 AM    SJOGREN'S ANTI-SS-B 0 06/13/2017 11:28 AM     Lab Results   Component Value Date/Time    COLOR Light-Red 10/04/2016 10:56 AM    SPECIFIC GRAVITY 1.020 10/04/2016 10:56 AM    PH 6.0 10/04/2016 10:56 AM    GLUCOSE Trace* 10/04/2016 10:56 AM    KETONES Negative 10/04/2016 10:56 AM    PROTEIN 100* 10/04/2016 10:56 AM     Lab Results   Component Value Date/Time    TOTAL PROTEIN, URINE 22.6* 04/06/2015 07:04 AM     Lab Results   Component Value Date/Time    SSA 60 (R0)(ERVIN) AB, IGG 1 07/10/2017 09:15 AM    SSA 52 (R0)(ERVIN) AB, IGG 4 07/10/2017 09:15 AM     Lab Results   Component Value Date/Time     GLYCOHEMOGLOBIN 6.5* 06/13/2017 11:28 AM     No results found for: CPKTOTAL  No results found for: G6PD  No results found for: IWGY48HZFQ  No results found for: ACESERUM  Lab Results   Component Value Date/Time    25-HYDROXY   VITAMIN D 25 48 06/13/2017 11:28 AM     Lab Results   Component Value Date/Time    TSH 0.763 03/09/2010 07:15 AM    FREE T-4 1.01 03/09/2010 07:15 AM     Lab Results   Component Value Date/Time    TSH 1.820 06/30/2016 09:30 AM    FREE T-4 0.79 04/06/2015 07:03 AM     Lab Results   Component Value Date/Time    MICROSOMAL -TPO- ABS 0.4 04/06/2015 07:03 AM    ANTI-THYROGLOBULIN <0.9 04/06/2015 07:03 AM     No results found for: IGGLYMEABS  No results found for: ANTIMITOCHO, FACTIN  Lab Results   Component Value Date/Time    IMMUNOGLOBULIN A 278 10/01/2012 11:58 AM    T-TG IGA 8 10/01/2012 11:58 AM     No results found for: FLTYPE, CRYSTALSBDF  No results found for: ISTATICAL  No results found for: ISTATCREAT  No results found for: CTELOPEP  No results found for: GBMABG  No results found for: PTHINTACT          Results for orders placed in visit on 03/09/12   DS-BONE DENSITY STUDY (DEXA)    Impression According to the World Health Organization classification, bone mineral density of this patient is normal .                       Results for orders placed in visit on 09/05/14   DX-SHOULDER 2+    Impression No acute fracture or dislocation proximal right humerus. Soft tissue calcifications could indicate calcific tendinitis.           Results for orders placed during the hospital encounter of 02/11/06   DX-ANKLE 3+ VIEWS    Impression IMPRESSION:    NO FRACTURE.     [email or web address]        Read By FANNY WATKINS MD on Feb 11 2006  8:20PM  : LVFLORENTIN Transcription Date: Feb 11 2006 11:08PM  THIS DOCUMENT HAS BEEN ELECTRONICALLY SIGNED BY: FANNY WATKINS MD on   Feb 12 2006  4:13AM                   Assessment and Plan:  Ms. Radha Marlow presents with a high titer positive SUMMER.   Clinically the patient is denying any arthralgias or prolonged morning stiffness, Raynaud's, arthritis or arthralgias. She denies any history of serositis or rashes. There is limited findings other than a concern for her elevated sedimentation rate. However her SUMMER is high titer and I would recommend that we continue to evaluate her for other connective tissue diseases including scleroderma as well as obtaining a rheumatoid factor and repeating her thyroid antibodies. She denies any weakness. He is revealed also check a Dinesh-1 antibody.      In addition she has a raised sedimentation rate. Her upper limit of normal is 27. Her last sedimentation rate is 33. We will repeat her sedimentation rate of course autoimmune disease can show signs of inflammation. There are other factors including gender salt weight and anemia that can contribute to an elevated sedimentation rate. We will check her CBC as well as CMP. She is complaining on exam of right upper quadrant pain as well. We will check check an antimitochondrial antibody and anti-smooth muscle antibody.    1. Positive SUMMER (antinuclear antibody)  CHROMATIN AB,IGG    ANTI SCL-70 ABS    CENTROMERE AB, IGG    ANTITHYROGLOBULIN AB    THYROID PEROXIDASE  (TPO) AB    URINALYSIS    HLA-B27    WESTERGREN SED RATE    CRP QUANTITIVE (NON-CARDIAC)    FOLATE    CBC WITH DIFFERENTIAL    COMP METABOLIC PANEL    ANTI-SMOOTH MUSCLE ABS    MITOCHONDRIAL (M2) AB    CCP    RHEUMATOID ARTHRITIS FACTOR    DINESH-1 AB,IGG   2. ESR raised  CHROMATIN AB,IGG    ANTI SCL-70 ABS    CENTROMERE AB, IGG    ANTITHYROGLOBULIN AB    THYROID PEROXIDASE  (TPO) AB    URINALYSIS    HLA-B27    WESTERGREN SED RATE    CRP QUANTITIVE (NON-CARDIAC)    FOLATE    CBC WITH DIFFERENTIAL    COMP METABOLIC PANEL    ANTI-SMOOTH MUSCLE ABS    MITOCHONDRIAL (M2) AB    CCP    RHEUMATOID ARTHRITIS FACTOR       Records requested.  Followup: Return in about 3 weeks (around 8/23/2017). or sooner prn  Patient was seen 60 minutes  face-to-face (excluding time for procedures)  of which more than 50% the time was spent counseling the patient regarding  rheumatological conditions and care. Therapy was discussed in detail.  Thank you for this referral.

## 2017-08-02 NOTE — Clinical Note
[facility]   [address], [address]  [place] [address]  Phone: [phone]  Fax: [phone]              Encounter Date: 8/2/2017    Dear Dr. Parikh,    It was a pleasure seeing your patient, Radha Marlow, on 8/2/2017. Diagnoses of Positive SUMMER (antinuclear antibody) and ESR raised were pertinent to this visit.     Please find attached progress note which includes the history I obtained from Ms. Marlow, my physical examination findings, my impression and recommendations.      Once again, it was a pleasure participating in your patient's care.  Please feel free to contact me if you have any questions or if I can be of any further assistance to your patients.      Sincerely,    Lulu Wilburn M.D.  Electronically Signed          PROGRESS NOTE:  Chief Complaint- abnormal bloodwork    Chief Complaint   Patient presents with   • New Patient     new patient      Radha Marlow is a 46 y.o. female here as a new Rheumatology Consult referred by SAMY Roach for consultation regarding referral for positive SUMMER 1:5120    HPI:     Ms. Radha Marlow presents with a history of positive SUMMER, DM    In 2014 started her career and for the last 2.5 years has had a busy career with work and schooling.  She reports that he has had fatigue, weight pain.  On check her PCP was concern for a persistently elevated ESR that lead to evaluation for SUMMER.  She is here for evaluation.  She reports 5 minutes of morning stiffness.  She denies joint swelling.  She denies to photosensitivity but mild rash on anterior chest.    No swollen glands.  She does note hair thinning since age 19.  She denies sicca symptoms or Raynauds.  She denies eye inflammation,  She denies sinus trouble .  She denies tinnitus or dysphagia.    She denies chest pain or shortness of breath.  She denies oral ulcers.  She denies chest pain, shortness of breath, palpitations or cough.    She does admit to night  sweats that started since her first child where she would feel cold with rigors but she on touch she is hot.  She reports hyperhidrosis and there are night when she will sweat all night.  She denies joint pains other than intermittent pain in her knees.  No constipation no loss of voice.  No numbness or tingling. No rashes.   No nosebleeds.  She has pain in the thoracic region and occassionally her lower back hurts and midback.    She does get diarrhea due to her Metformin      Results for AUBREY JARAMILLO (MRN [id number]) as of 2017 15:20   Ref. Range 2017 11:28 7/10/2017 09:15   C3 Complement Latest Ref Range: 87.0-200.0 mg/dL  174.0   Complement C4 Latest Ref Range: 19.0-52.0 mg/dL  33.0   Complement Total Hemolytic Latest Ref Range:   Units  185 (H)   Stat C-Reactive Protein Latest Ref Range: 0.00-0.75 mg/dL 0.89 (H)    Anti-Dna -Ds Latest Ref Range: None Detected  None Detected None Detected   Antinuclear Antibody Latest Ref Range: None Detected  Detected (A)    Rnp Antibodies Latest Ref Range: 0-40 AU/mL 0    Smith Antibodies Latest Ref Range: 0-40 AU/mL 0 0   SSA 52 (R0)(ERVIN) Ab, IgG Latest Ref Range: 0-40 AU/mL 8 4   SSA 60 (R0)(ERVIN) Ab, IgG Latest Ref Range: 0-40 AU/mL 4 1   Sjogren'S Anti-Ss-B Latest Ref Range: 0-40 AU/mL 0    SUMMER Titer Latest Ref Range: <1:40  1:5120 (H)      Results for AUBREY JARAMILLO (MRN [id number]) as of 2017 15:25   Ref. Range 10/1/2012 11:58 2015 07:03   Microsomal -Tpo- Abs Latest Ref Range: 0.0-9.0 IU/mL 0.4 0.4   Anti-Thyroglobulin Latest Ref Range: 0.0-4.0 IU/mL <0.9 <0.9   Gliadin Antibodies Iga Latest Ref Range: 0-19 Units 9        Esr was persistently elevated    Past Medical HIstory: DM, infertility (at age 16 she experienced amenorrhea and diagnosed with PCOS managed with birth control), no miscarriages, gestational diabetes with 3 kids, premature births (@ 33 weeks, @ 37 weeks, @ 38 weeks).        Social History: not a current smoker, no  alcohol, school as a , [place]      Family History: son has very dry eyes ad has chronic hives, heart stents, DM,     Current medicines (including changes today)  Current Outpatient Prescriptions   Medication Sig Dispense Refill   • ibuprofen (MOTRIN) 200 MG Tab Take 200 mg by mouth every 6 hours as needed.     • metformin ER (GLUCOPHAGE XR) 500 MG TABLET SR 24 HR Take 2 Tabs by mouth 2 times a day. 120 Tab 5   • DOXYCYCLINE PO Take  by mouth.     • metronidazole (METROCREAM) 0.75 % cream Apply  to affected area(s) 2 times a day.     • atorvastatin (LIPITOR) 20 MG Tab TAKE 1 TAB BY MOUTH EVERY DAY. 90 Tab 0   • ONE TOUCH ULTRA TEST strip USE 4 TIMES A DAY AND AS NEEDED FOR HIGH OR LOW BLOOD SUGAR  3   • Blood Glucose Monitoring Suppl Device Meter: Dispense insurance specific meter. Sig. Use as directed for blood sugar monitoring. #1. NR. DX: E11.9 1 Device 0   • Lancets Misc Lancets order: Lancets for insurance specific meter. Sig: use qid and prn ssx high or low sugar. #100 RF x 3 DX: E11.9 100 Each 3   • Blood Glucose Monitoring Suppl SUPPLIES Misc Test strips order: Test strips for insurance specific meter. Sig: use qid and prn ssx high or low sugar #100 RF x 3. DX: E11.9 100 Each 3   • Multiple Vitamin (DAILY VITAMINS PO) Take  by mouth.     • fluticasone (FLONASE) 50 MCG/ACT nasal spray Spray 1 Spray in nose 2 times a day. Each Nostril 1 Bottle 3   • BLISOVI FE 1/20 1-20 MG-MCG per tablet 1 Tab every day.  3   • Cholecalciferol (VITAMIN D) 2000 UNITS Cap Take  by mouth.       No current facility-administered medications for this visit.     She  has a past medical history of Hypertriglyceridemia (3/17/2010); Allergy; Anemia; Arthritis; and Hyperlipidemia. She also has no past medical history of Breast cancer (CMS-[facility]).  She  has no past surgical history on file.  Family History   Problem Relation Age of Onset   • Diabetes Mother    • Heart Disease Father    • Hypertension Father    • Hyperlipidemia  Father    • Diabetes Brother    • Heart Disease Brother    • Hypertension Brother    • Hyperlipidemia Brother    • Diabetes Maternal Aunt    • Diabetes Maternal Uncle    • Cancer Maternal Grandmother      Cervical/ovarian   • Diabetes Maternal Grandfather    • Heart Disease Maternal Grandfather    • Hypertension Maternal Grandfather    • Hyperlipidemia Maternal Grandfather    • Stroke Maternal Grandfather    • Stroke Paternal Grandmother    • Hyperlipidemia Paternal Grandmother    • Hypertension Paternal Grandmother    • Heart Disease Paternal Grandmother    • Heart Disease Paternal Grandfather    • Hypertension Paternal Grandfather    • Hyperlipidemia Paternal Grandfather      Family Status   Relation Status Death Age   • Mother Alive    • Father Alive    • Sister Alive    • Brother Alive    • Maternal Aunt Alive    • Maternal Uncle Alive    • Paternal Aunt Alive    • Paternal Uncle Alive    • Maternal Grandmother     • Maternal Grandfather     • Paternal Grandmother Alive    • Paternal Grandfather Alive    • Daughter Alive    • Son Alive    • Son Alive    • Son Alive      Social History   Substance Use Topics   • Smoking status: Former Smoker -- 0.25 packs/day for 1 years     Types: Cigarettes     Quit date: 1991   • Smokeless tobacco: Never Used   • Alcohol Use: No      Comment: Alevism prohibits     Social History     Social History Narrative         ROS  Constitutional ROS: No weakness, fatigue and night sweats  Eye ROS: No eye pain, redness, discharge  Ear ROS: No recent change in hearing  Mouth/Throat ROS: No recent change in voice or hoarseness  Neck ROS: No significant pain in neck  Pulmonary ROS: No shortness of breath, No recent change in breathing  Cardiovascular ROS: No chest pain, No shortness of breath  Gastrointestinal ROS: No abdominal pain, does get diarrhea  Musculoskeletal/Extremities ROS: No cyanosis  Hematologic/Lymphatic ROS: No coagulation disorder  Skin/Integumentary  "ROS: color, texture and temperature normal  Neurologic ROS: Normal development       Objective:     Blood pressure 108/74, pulse 88, temperature 36.4 °C (97.6 °F), resp. rate 12, height 1.626 m (5' 4.02\"), weight 110.678 kg (244 lb), last menstrual period 06/30/2017, SpO2 94 %, not currently breastfeeding. Body mass index is 41.86 kg/(m^2).  Physical Exam:    Filed Vitals:    08/02/17 1501   BP: 108/74   Pulse: 88   Temp: 36.4 °C (97.6 °F)   Resp: 12   Height: 1.626 m (5' 4.02\")   Weight: 110.678 kg (244 lb)   SpO2: 94%    Body mass index is 41.86 kg/(m^2).    General/Constitutional: NAD not diaphoretic, comfortable  Eyes: clear conjunctiva, no scleral icterus, EOMI, PERRL  Ears, Nose, Mouth,Throat: no oral ulcers, good dentition, moist mucous membranes, no discharge from ears bilaterally  Cardiovascular: regular rate and rhythm.  No murmurs, gallops, rubs  Respiratory: normal effort, unlabored respiration.  On auscultation no wheezes, rales, rhonchi.  Clear to auscultation.  GI: soft, NTTP no hepatosplenomegaly, nondistended.  There is mild tenderness no guarding in the RUQ  Musculoskeletal  Axial:  Thoracic: no paraspinal tenderness  Lumbar: no paraspinal tenderness  Upper Extremities:  No synovitis of the PIP, DIP, MCP  Wrists and Elbows have good ROM  Muscle Strength: 5/5 in deltoids, biceps, triceps, finger , wrists extension bilateral  Lower Extremities:  No knee effusion bilateral, No crepitus bilateral  No MTP tenderness bilateral  Muscle Strength: 5/5 in dorsiflexion, plantarflexion, knee extension, knee flexion, and hip flexion bilateral  Gait is normal  Skin: diffuse hair loss; Limited skin exam.  no rashes, no digital ulcerations, no alopecia, no tophi, no skin thickening, no nodules  Neuro: CN II-XII grossly intact, Alert, Oriented x 3, moves all four extremities  Psych: normal affect, normal mood, judgement appropriate, follows commands, responses are appropritae  Heme/Lymph: no cervical " adenopathy; no thyromegaly        No results found for: QNTTBGOLD  No results found for: HEPBCORTOT, HEPBCORIGM, HEPBSAG  No results found for: HEPCAB  Lab Results   Component Value Date/Time    SODIUM 135 10/04/2016 10:56 AM    POTASSIUM 4.0 10/04/2016 10:56 AM    CHLORIDE 103 10/04/2016 10:56 AM    CO2 24 10/04/2016 10:56 AM    GLUCOSE 125* 10/04/2016 10:56 AM    BUN 15 10/04/2016 10:56 AM    CREATININE 0.67 07/10/2017 09:15 AM    BUN-CREATININE RATIO 23 03/09/2010 07:15 AM    GLOM FILT RATE, EST >59 03/09/2010 07:15 AM      Lab Results   Component Value Date/Time    WBC 6.1 10/04/2016 10:56 AM    RBC 4.76 10/04/2016 10:56 AM    HEMOGLOBIN 14.6 10/04/2016 10:56 AM    HEMATOCRIT 44.1 10/04/2016 10:56 AM    MCV 92.6 10/04/2016 10:56 AM    MCH 30.7 10/04/2016 10:56 AM    MCHC 33.1* 10/04/2016 10:56 AM    MPV 10.2 10/04/2016 10:56 AM    NEUTROPHILS-POLYS 60.10 10/04/2016 10:56 AM    LYMPHOCYTES 28.80 10/04/2016 10:56 AM    MONOCYTES 8.70 10/04/2016 10:56 AM    EOSINOPHILS 1.30 10/04/2016 10:56 AM    BASOPHILS 0.80 10/04/2016 10:56 AM      Lab Results   Component Value Date/Time    CALCIUM 9.8 10/04/2016 10:56 AM    AST(SGOT) 35 10/04/2016 10:56 AM    ALT(SGPT) 43 10/04/2016 10:56 AM    ALKALINE PHOSPHATASE 47 10/04/2016 10:56 AM    TOTAL BILIRUBIN 0.4 10/04/2016 10:56 AM    ALBUMIN 4.2 10/04/2016 10:56 AM    TOTAL PROTEIN 8.1 10/04/2016 10:56 AM     Lab Results   Component Value Date/Time    ANTINUCLEAR ANTIBODY Detected* 06/13/2017 11:28 AM     Lab Results   Component Value Date/Time    SED RATE WESTERGREN 33* 06/13/2017 11:28 AM    STAT C-REACTIVE PROTEIN 0.89* 06/13/2017 11:28 AM     No results found for: ALEXANDRA ROSE  Lab Results   Component Value Date/Time    C3 COMPLEMENT 174.0 07/10/2017 09:15 AM    COMPLEMENT C4 33.0 07/10/2017 09:15 AM     Lab Results   Component Value Date/Time    ANTI-DNA -DS None Detected 07/10/2017 09:15 AM    RNP ANTIBODIES 0 06/13/2017 11:28 AM    SMITH ANTIBODIES 0  07/10/2017 09:15 AM     Lab Results   Component Value Date/Time    ANTI-DNA -DS None Detected 07/10/2017 09:15 AM    C3 COMPLEMENT 174.0 07/10/2017 09:15 AM    RNP ANTIBODIES 0 06/13/2017 11:28 AM    SJOGREN'S ANTI-SS-B 0 06/13/2017 11:28 AM     Lab Results   Component Value Date/Time    COLOR Light-Red 10/04/2016 10:56 AM    SPECIFIC GRAVITY 1.020 10/04/2016 10:56 AM    PH 6.0 10/04/2016 10:56 AM    GLUCOSE Trace* 10/04/2016 10:56 AM    KETONES Negative 10/04/2016 10:56 AM    PROTEIN 100* 10/04/2016 10:56 AM     Lab Results   Component Value Date/Time    TOTAL PROTEIN, URINE 22.6* 04/06/2015 07:04 AM     Lab Results   Component Value Date/Time    SSA 60 (R0)(ERVIN) AB, IGG 1 07/10/2017 09:15 AM    SSA 52 (R0)(ERVIN) AB, IGG 4 07/10/2017 09:15 AM     Lab Results   Component Value Date/Time    GLYCOHEMOGLOBIN 6.5* 06/13/2017 11:28 AM     No results found for: CPKTOTAL  No results found for: G6PD  No results found for: QUWA65EPNS  No results found for: ACESERUM  Lab Results   Component Value Date/Time    25-HYDROXY   VITAMIN D 25 48 06/13/2017 11:28 AM     Lab Results   Component Value Date/Time    TSH 0.763 03/09/2010 07:15 AM    FREE T-4 1.01 03/09/2010 07:15 AM     Lab Results   Component Value Date/Time    TSH 1.820 06/30/2016 09:30 AM    FREE T-4 0.79 04/06/2015 07:03 AM     Lab Results   Component Value Date/Time    MICROSOMAL -TPO- ABS 0.4 04/06/2015 07:03 AM    ANTI-THYROGLOBULIN <0.9 04/06/2015 07:03 AM     No results found for: IGGLYMEABS  No results found for: ANTIMITOCHO, FACTIN  Lab Results   Component Value Date/Time    IMMUNOGLOBULIN A 278 10/01/2012 11:58 AM    T-TG IGA 8 10/01/2012 11:58 AM     No results found for: FLTYPE, CRYSTALSBDF  No results found for: ISTATICAL  No results found for: ISTATCREAT  No results found for: CTELOPEP  No results found for: GBMABG  No results found for: PTHINTACT          Results for orders placed in visit on 03/09/12   DS-BONE DENSITY STUDY (DEXA)    Impression According  to the World Health Organization classification, bone mineral density of this patient is normal .                       Results for orders placed in visit on 09/05/14   DX-SHOULDER 2+    Impression No acute fracture or dislocation proximal right humerus. Soft tissue calcifications could indicate calcific tendinitis.           Results for orders placed during the hospital encounter of 02/11/06   DX-ANKLE 3+ VIEWS    Impression IMPRESSION:    NO FRACTURE.     [email or web address]        Read By FANNY WATKINS MD on Feb 11 2006  8:20PM  : CHUCKY Transcription Date: Feb 11 2006 11:08PM  THIS DOCUMENT HAS BEEN ELECTRONICALLY SIGNED BY: FANNY WATKINS MD on   Feb 12 2006  4:13AM                   Assessment and Plan:  Ms. Radha Marlow presents with a high titer positive SUMMER.  Clinically the patient is denying any arthralgias or prolonged morning stiffness, Raynaud's, arthritis or arthralgias. She denies any history of serositis or rashes. There is limited findings other than a concern for her elevated sedimentation rate. However her SUMMER is high titer and I would recommend that we continue to evaluate her for other connective tissue diseases including scleroderma as well as obtaining a rheumatoid factor and repeating her thyroid antibodies. She denies any weakness. He is revealed also check a Susanne-1 antibody.      In addition she has a raised sedimentation rate. Her upper limit of normal is 27. Her last sedimentation rate is 33. We will repeat her sedimentation rate of course autoimmune disease can show signs of inflammation. There are other factors including gender salt weight and anemia that can contribute to an elevated sedimentation rate. We will check her CBC as well as CMP. She is complaining on exam of right upper quadrant pain as well. We will check check an antimitochondrial antibody and anti-smooth muscle antibody.    1. Positive SUMMER (antinuclear antibody)  CHROMATIN AB,IGG    ANTI SCL-70 ABS     CENTROMERE AB, IGG    ANTITHYROGLOBULIN AB    THYROID PEROXIDASE  (TPO) AB    URINALYSIS    HLA-B27    WESTERGREN SED RATE    CRP QUANTITIVE (NON-CARDIAC)    FOLATE    CBC WITH DIFFERENTIAL    COMP METABOLIC PANEL    ANTI-SMOOTH MUSCLE ABS    MITOCHONDRIAL (M2) AB    CCP    RHEUMATOID ARTHRITIS FACTOR    DINESH-1 AB,IGG   2. ESR raised  CHROMATIN AB,IGG    ANTI SCL-70 ABS    CENTROMERE AB, IGG    ANTITHYROGLOBULIN AB    THYROID PEROXIDASE  (TPO) AB    URINALYSIS    HLA-B27    WESTERGREN SED RATE    CRP QUANTITIVE (NON-CARDIAC)    FOLATE    CBC WITH DIFFERENTIAL    COMP METABOLIC PANEL    ANTI-SMOOTH MUSCLE ABS    MITOCHONDRIAL (M2) AB    CCP    RHEUMATOID ARTHRITIS FACTOR       Records requested.  Followup: Return in about 3 weeks (around 8/23/2017). or sooner prn  Patient was seen 60 minutes face-to-face (excluding time for procedures)  of which more than 50% the time was spent counseling the patient regarding  rheumatological conditions and care. Therapy was discussed in detail.  Thank you for this referral.

## 2017-08-02 NOTE — MR AVS SNAPSHOT
"Radha Marlow   2017 3:00 PM   Office Visit   MRN: [id number]    Department:  [facility]   Dept Phone:  [phone]    Description:  Female : 1970   Provider:  Lulu Wilburn M.D.           Reason for Visit     New Patient new patient       Allergies as of 2017     Allergen Noted Reactions    Bactrim [Sulfamethoxazole W-Trimethoprim] 2016   Unspecified    Rash     No Known Drug Allergy 2007         You were diagnosed with     Positive SUMMER (antinuclear antibody)   [194690]       ESR raised   [253030]         Vital Signs     Blood Pressure Pulse Temperature Respirations Height Weight    108/74 mmHg 88 36.4 °C (97.6 °F) 12 1.626 m (5' 4.02\") 110.678 kg (244 lb)    Body Mass Index Oxygen Saturation Last Menstrual Period Breastfeeding? Smoking Status       41.86 kg/m2 94% 2017 No Former Smoker       Basic Information     Date Of Birth Sex Race Ethnicity Preferred Language    1970 Female White  Origin (Pashto,Monegasque,Italian,Gibraltarian, etc) English      Your appointments     Aug 29, 2017  5:00 PM   Follow Up Visit with Lulu Wilburn M.D.   [facility] (--)    [address] [place] [address], [address] [address] [phone]           You will be receiving a confirmation call a few days before your appointment from our automated call confirmation system.              Problem List              ICD-10-CM Priority Class Noted - Resolved    Hypertriglyceridemia E78.1   3/17/2010 - Present    PCOS (polycystic ovarian syndrome) E28.2   2012 - Present    Hyperlipidemia associated with type 2 diabetes mellitus (CMS-HCC) E11.69, E78.5   3/1/2012 - Present    Controlled type 2 diabetes mellitus without complication, without long-term current use of insulin (CMS-HCC) E11.9   2016 - Present    Vitamin D deficiency E55.9   2016 - Present    BMI 40.0-44.9, adult (CMS-HCC) Z68.41   2016 - Present    Rosacea L71.9   2016 - Present      "   Health Maintenance        Date Due Completion Dates    IMM HEP B VACCINE (1 of 3 - Primary Series) 1970 ---    IMM PNEUMOCOCCAL 19-64 (ADULT) MEDIUM RISK SERIES (1 of 1 - PPSV23) 10/3/1990 ---    PAP SMEAR 2/25/2016 2/25/2013, 2/22/2012    RETINAL SCREENING 12/11/2016 12/11/2015    IMM INFLUENZA (1) 9/1/2017 ---    DIABETES MONOFILAMENT / LE EXAM 10/6/2017 10/6/2016, 7/7/2016    A1C SCREENING 12/13/2017 6/13/2017, 10/6/2016, 6/30/2016, 4/11/2016, 6/2/2015, 1/31/2014, 11/1/2013, 10/1/2012, 2/23/2012    URINE ACR / MICROALBUMIN 1/6/2018 1/6/2017, 6/30/2016, 4/6/2015, 10/1/2012, 2/23/2012    MAMMOGRAM 5/4/2018 5/4/2017, 4/11/2016, 3/18/2013, 3/9/2012, 6/15/2007, 6/15/2007    FASTING LIPID PROFILE 6/13/2018 6/13/2017, 6/30/2016, 4/11/2016, 4/6/2015, 11/1/2013, 4/2/2012, 2/23/2012, 3/9/2010    SERUM CREATININE 7/10/2018 7/10/2017, 10/4/2016, 8/31/2016, 8/29/2016, 6/30/2016, 4/11/2016, 4/6/2015, 1/31/2014, 11/1/2013, 4/2/2012, 2/23/2012, 3/9/2010    IMM DTaP/Tdap/Td Vaccine (2 - Td) 5/28/2024 5/28/2014            Current Immunizations     MMR Vaccine 7/29/2009    Tdap Vaccine 5/28/2014      Below and/or attached are the medications your provider expects you to take. Review all of your home medications and newly ordered medications with your provider and/or pharmacist. Follow medication instructions as directed by your provider and/or pharmacist. Please keep your medication list with you and share with your provider. Update the information when medications are discontinued, doses are changed, or new medications (including over-the-counter products) are added; and carry medication information at all times in the event of emergency situations     Allergies:  BACTRIM - Unspecified     NO KNOWN DRUG ALLERGY - (reactions not documented)               Medications  Valid as of: August 02, 2017 -  4:11 PM    Generic Name Brand Name Tablet Size Instructions for use    Atorvastatin Calcium (Tab) LIPITOR 20 MG TAKE 1 TAB BY  MOUTH EVERY DAY.        Blood Glucose Monitoring Suppl (Device) Blood Glucose Monitoring Suppl  Meter: Dispense insurance specific meter. Sig. Use as directed for blood sugar monitoring. #1. NR. DX: E11.9        Blood Glucose Monitoring Suppl (Misc) Blood Glucose Monitoring Suppl SUPPLIES Test strips order: Test strips for insurance specific meter. Sig: use qid and prn ssx high or low sugar #100 RF x 3. DX: E11.9        Cholecalciferol (Cap) Vitamin D 2000 UNITS Take  by mouth.        Doxycycline   Take  by mouth.        Fluticasone Propionate (Suspension) FLONASE 50 MCG/ACT Spray 1 Spray in nose 2 times a day. Each Nostril        Glucose Blood (Strip) ONE TOUCH ULTRA TEST  USE 4 TIMES A DAY AND AS NEEDED FOR HIGH OR LOW BLOOD SUGAR        Ibuprofen (Tab) MOTRIN 200 MG Take 200 mg by mouth every 6 hours as needed.        Lancets (Misc) Lancets  Lancets order: Lancets for insurance specific meter. Sig: use qid and prn ssx high or low sugar. #100 RF x 3 DX: E11.9        MetFORMIN HCl (TABLET SR 24 HR) GLUCOPHAGE  MG Take 2 Tabs by mouth 2 times a day.        MetroNIDAZOLE (Cream) METROCREAM 0.75 % Apply  to affected area(s) 2 times a day.        Multiple Vitamin   Take  by mouth.        Norethin Ace-Eth Estrad-FE (Tab) BLISOVI FE 1/20 1-20 MG-MCG 1 Tab every day.        .                 Medicines prescribed today were sent to:     [id number] - [place] - [address] [place] [address]    Phone: [phone] Fax: [phone]    Open 24 Hours?: No      Medication refill instructions:       If your prescription bottle indicates you have medication refills left, it is not necessary to call your provider’s office. Please contact your pharmacy and they will refill your medication.    If your prescription bottle indicates you do not have any refills left, you may request refills at any time through one of the following ways: The online [employer] system (except Urgent Care), by calling your  provider’s office, or by asking your pharmacy to contact your provider’s office with a refill request. Medication refills are processed only during regular business hours and may not be available until the next business day. Your provider may request additional information or to have a follow-up visit with you prior to refilling your medication.   *Please Note: Medication refills are assigned a new Rx number when refilled electronically. Your pharmacy may indicate that no refills were authorized even though a new prescription for the same medication is available at the pharmacy. Please request the medicine by name with the pharmacy before contacting your provider for a refill.        Your To Do List     Future Labs/Procedures Complete By Expires    CRP QUANTITIVE (NON-CARDIAC)  8/14/2017 8/2/2018    ANTI SCL-70 ABS  As directed 8/2/2018    ANTI-SMOOTH MUSCLE ABS  As directed 8/2/2018    ANTITHYROGLOBULIN AB  As directed 8/2/2018    CBC WITH DIFFERENTIAL  As directed 8/2/2018    CCP  As directed 8/2/2018    CENTROMERE AB, IGG  As directed 8/2/2018    CHROMATIN AB,IGG  As directed 8/2/2018    COMP METABOLIC PANEL  As directed 8/2/2018    FOLATE  As directed 8/2/2018    HLA-B27  As directed 8/2/2018    MITOCHONDRIAL (M2) AB  As directed 8/2/2018    RHEUMATOID ARTHRITIS FACTOR  As directed 8/2/2018    THYROID PEROXIDASE  (TPO) AB  As directed 8/2/2018    URINALYSIS  As directed 8/2/2018    WESTERGREN SED RATE  As directed 8/2/2018         Webflowt Access Code: Activation code not generated  Current [facility] Status: Active

## 2017-08-12 ENCOUNTER — HOSPITAL ENCOUNTER (OUTPATIENT)
Dept: LAB | Facility: MEDICAL CENTER | Age: 47
End: 2017-08-12
Attending: INTERNAL MEDICINE
Payer: COMMERCIAL

## 2017-08-12 DIAGNOSIS — R70.0 ESR RAISED: ICD-10-CM

## 2017-08-12 DIAGNOSIS — R76.8 POSITIVE ANA (ANTINUCLEAR ANTIBODY): ICD-10-CM

## 2017-08-12 LAB
ALBUMIN SERPL BCP-MCNC: 3.8 G/DL (ref 3.2–4.9)
ALBUMIN/GLOB SERPL: 1.2 G/DL
ALP SERPL-CCNC: 47 U/L (ref 30–99)
ALT SERPL-CCNC: 57 U/L (ref 2–50)
ANION GAP SERPL CALC-SCNC: 7 MMOL/L (ref 0–11.9)
APPEARANCE UR: ABNORMAL
AST SERPL-CCNC: 35 U/L (ref 12–45)
BACTERIA #/AREA URNS HPF: ABNORMAL /HPF
BASOPHILS # BLD AUTO: 0.5 % (ref 0–1.8)
BASOPHILS # BLD: 0.03 K/UL (ref 0–0.12)
BILIRUB SERPL-MCNC: 0.5 MG/DL (ref 0.1–1.5)
BILIRUB UR QL STRIP.AUTO: NEGATIVE
BUN SERPL-MCNC: 18 MG/DL (ref 8–22)
CALCIUM SERPL-MCNC: 9.3 MG/DL (ref 8.5–10.5)
CHLORIDE SERPL-SCNC: 106 MMOL/L (ref 96–112)
CO2 SERPL-SCNC: 21 MMOL/L (ref 20–33)
COLOR UR: YELLOW
CREAT SERPL-MCNC: 0.63 MG/DL (ref 0.5–1.4)
CRP SERPL HS-MCNC: 0.66 MG/DL (ref 0–0.75)
EOSINOPHIL # BLD AUTO: 0.08 K/UL (ref 0–0.51)
EOSINOPHIL NFR BLD: 1.4 % (ref 0–6.9)
EPI CELLS #/AREA URNS HPF: ABNORMAL /HPF
ERYTHROCYTE [DISTWIDTH] IN BLOOD BY AUTOMATED COUNT: 43.3 FL (ref 35.9–50)
ERYTHROCYTE [SEDIMENTATION RATE] IN BLOOD BY WESTERGREN METHOD: 40 MM/HOUR (ref 0–20)
FOLATE SERPL-MCNC: 22.3 NG/ML
GFR SERPL CREATININE-BSD FRML MDRD: >60 ML/MIN/1.73 M 2
GLOBULIN SER CALC-MCNC: 3.2 G/DL (ref 1.9–3.5)
GLUCOSE SERPL-MCNC: 140 MG/DL (ref 65–99)
GLUCOSE UR STRIP.AUTO-MCNC: NEGATIVE MG/DL
HCT VFR BLD AUTO: 39.6 % (ref 37–47)
HGB BLD-MCNC: 13.5 G/DL (ref 12–16)
IMM GRANULOCYTES # BLD AUTO: 0.03 K/UL (ref 0–0.11)
IMM GRANULOCYTES NFR BLD AUTO: 0.5 % (ref 0–0.9)
KETONES UR STRIP.AUTO-MCNC: NEGATIVE MG/DL
LEUKOCYTE ESTERASE UR QL STRIP.AUTO: ABNORMAL
LYMPHOCYTES # BLD AUTO: 1.62 K/UL (ref 1–4.8)
LYMPHOCYTES NFR BLD: 27.9 % (ref 22–41)
MCH RBC QN AUTO: 31.6 PG (ref 27–33)
MCHC RBC AUTO-ENTMCNC: 34.1 G/DL (ref 33.6–35)
MCV RBC AUTO: 92.7 FL (ref 81.4–97.8)
MICRO URNS: ABNORMAL
MONOCYTES # BLD AUTO: 0.39 K/UL (ref 0–0.85)
MONOCYTES NFR BLD AUTO: 6.7 % (ref 0–13.4)
NEUTROPHILS # BLD AUTO: 3.65 K/UL (ref 2–7.15)
NEUTROPHILS NFR BLD: 63 % (ref 44–72)
NITRITE UR QL STRIP.AUTO: NEGATIVE
NRBC # BLD AUTO: 0 K/UL
NRBC BLD AUTO-RTO: 0 /100 WBC
PH UR STRIP.AUTO: 5.5 [PH]
PLATELET # BLD AUTO: 233 K/UL (ref 164–446)
PMV BLD AUTO: 10.9 FL (ref 9–12.9)
POTASSIUM SERPL-SCNC: 4 MMOL/L (ref 3.6–5.5)
PROT SERPL-MCNC: 7 G/DL (ref 6–8.2)
PROT UR QL STRIP: NEGATIVE MG/DL
RBC # BLD AUTO: 4.27 M/UL (ref 4.2–5.4)
RBC # URNS HPF: ABNORMAL /HPF
RBC UR QL AUTO: NEGATIVE
RHEUMATOID FACT SER IA-ACNC: <10 IU/ML (ref 0–14)
SODIUM SERPL-SCNC: 134 MMOL/L (ref 135–145)
SP GR UR STRIP.AUTO: 1.03
THYROPEROXIDASE AB SERPL-ACNC: 0.6 IU/ML (ref 0–9)
UROBILINOGEN UR STRIP.AUTO-MCNC: 0.2 MG/DL
WBC # BLD AUTO: 5.8 K/UL (ref 4.8–10.8)
WBC #/AREA URNS HPF: ABNORMAL /HPF

## 2017-08-12 PROCEDURE — 85025 COMPLETE CBC W/AUTO DIFF WBC: CPT

## 2017-08-12 PROCEDURE — 86800 THYROGLOBULIN ANTIBODY: CPT

## 2017-08-12 PROCEDURE — 86431 RHEUMATOID FACTOR QUANT: CPT

## 2017-08-12 PROCEDURE — 80053 COMPREHEN METABOLIC PANEL: CPT

## 2017-08-12 PROCEDURE — 86812 HLA TYPING A B OR C: CPT

## 2017-08-12 PROCEDURE — 82746 ASSAY OF FOLIC ACID SERUM: CPT

## 2017-08-12 PROCEDURE — 81001 URINALYSIS AUTO W/SCOPE: CPT

## 2017-08-12 PROCEDURE — 36415 COLL VENOUS BLD VENIPUNCTURE: CPT

## 2017-08-12 PROCEDURE — 86140 C-REACTIVE PROTEIN: CPT

## 2017-08-12 PROCEDURE — 86235 NUCLEAR ANTIGEN ANTIBODY: CPT | Mod: 91

## 2017-08-12 PROCEDURE — 83516 IMMUNOASSAY NONANTIBODY: CPT

## 2017-08-12 PROCEDURE — 85652 RBC SED RATE AUTOMATED: CPT

## 2017-08-12 PROCEDURE — 86255 FLUORESCENT ANTIBODY SCREEN: CPT

## 2017-08-12 PROCEDURE — 86376 MICROSOMAL ANTIBODY EACH: CPT

## 2017-08-12 PROCEDURE — 86200 CCP ANTIBODY: CPT

## 2017-08-14 LAB
CCP IGG SERPL-ACNC: 17 UNITS (ref 0–19)
CHROMATIN IGG SERPL-ACNC: 3 UNITS (ref 0–19)
HLA-B27 QL FC: NEGATIVE
MITOCHONDRIA M2 IGG SER-ACNC: 23.8 UNITS (ref 0–20)
SMA IGG SER-ACNC: 18 UNITS (ref 0–19)
THYROGLOB AB SERPL-ACNC: <0.9 IU/ML (ref 0–4)

## 2017-08-15 ENCOUNTER — TELEPHONE (OUTPATIENT)
Dept: RHEUMATOLOGY | Facility: PHYSICIAN GROUP | Age: 47
End: 2017-08-15

## 2017-08-15 LAB
CENTROMERE IGG TITR SER IF: 0 AU/ML (ref 0–40)
ENA JO1 AB TITR SER: 0 AU/ML (ref 0–40)
ENA SCL70 IGG SER QL: 0 AU/ML (ref 0–40)

## 2017-08-17 ENCOUNTER — TELEPHONE (OUTPATIENT)
Dept: RHEUMATOLOGY | Facility: PHYSICIAN GROUP | Age: 47
End: 2017-08-17

## 2017-08-17 DIAGNOSIS — N39.0 URINARY TRACT INFECTION WITHOUT HEMATURIA, SITE UNSPECIFIED: ICD-10-CM

## 2017-08-17 NOTE — TELEPHONE ENCOUNTER
Patient called in today stating that she has actually been having a lot of back pain and burning during urination. No other symptoms of a UTI. She was wandering if maybe she never fully recovered from her last UTI because she has experienced this ever since. She would like to be treated if you do believe she has a UTI thank you!

## 2017-08-18 RX ORDER — CEPHALEXIN 500 MG/1
500 CAPSULE ORAL 2 TIMES DAILY
Qty: 28 CAP | Refills: 0 | Status: SHIPPED | OUTPATIENT
Start: 2017-08-18 | End: 2018-03-09

## 2017-08-18 NOTE — TELEPHONE ENCOUNTER
Called pt. And left detailed VM relaying your message. Also stated in the message for her to give me a call back if she has any other questions or concerns. Thank you!

## 2017-08-21 NOTE — TELEPHONE ENCOUNTER
Was the patient seen in the last year in this department? Yes     Does patient have an active prescription for medications requested? No     Received Request Via: Pharmacy      Pt met protocol?: Yes    LAST OV 07/05/2017     BP Readings from Last 1 Encounters:   08/02/17 108/74     Lab Results   Component Value Date/Time    GLYCOHEMOGLOBIN 6.5* 06/13/2017 11:28 AM        Lab Results   Component Value Date/Time    LDL/HDL RADIO 2.6 03/09/2010 07:15 AM    HDL 39* 06/13/2017 11:28 AM

## 2017-08-22 RX ORDER — ATORVASTATIN CALCIUM 20 MG/1
TABLET, FILM COATED ORAL
Qty: 90 TAB | Refills: 1 | Status: SHIPPED | OUTPATIENT
Start: 2017-08-22 | End: 2018-03-05 | Stop reason: SDUPTHER

## 2017-08-22 NOTE — TELEPHONE ENCOUNTER
Pt has had OV within the 12 month protocol and lipid panel is current. 6 month supply sent to pharmacy.   Lab Results   Component Value Date/Time    CHOLESTEROL, 06/13/2017 11:28 AM    LDL 46 06/13/2017 11:28 AM    HDL 39* 06/13/2017 11:28 AM    TRIGLYCERIDES 143 06/13/2017 11:28 AM       Lab Results   Component Value Date/Time    SODIUM 134* 08/12/2017 08:35 AM    POTASSIUM 4.0 08/12/2017 08:35 AM    CHLORIDE 106 08/12/2017 08:35 AM    CO2 21 08/12/2017 08:35 AM    GLUCOSE 140* 08/12/2017 08:35 AM    BUN 18 08/12/2017 08:35 AM    CREATININE 0.63 08/12/2017 08:35 AM    BUN-CREATININE RATIO 23 03/09/2010 07:15 AM    GLOM FILT RATE, EST >59 03/09/2010 07:15 AM     Lab Results   Component Value Date/Time    ALKALINE PHOSPHATASE 47 08/12/2017 08:35 AM    AST(SGOT) 35 08/12/2017 08:35 AM    ALT(SGPT) 57* 08/12/2017 08:35 AM    TOTAL BILIRUBIN 0.5 08/12/2017 08:35 AM

## 2017-08-30 ENCOUNTER — OFFICE VISIT (OUTPATIENT)
Dept: RHEUMATOLOGY | Facility: PHYSICIAN GROUP | Age: 47
End: 2017-08-30
Payer: COMMERCIAL

## 2017-08-30 VITALS
SYSTOLIC BLOOD PRESSURE: 102 MMHG | BODY MASS INDEX: 41.52 KG/M2 | WEIGHT: 242 LBS | HEART RATE: 90 BPM | OXYGEN SATURATION: 94 % | DIASTOLIC BLOOD PRESSURE: 66 MMHG | TEMPERATURE: 97.7 F | RESPIRATION RATE: 12 BRPM

## 2017-08-30 DIAGNOSIS — R70.0 ESR RAISED: ICD-10-CM

## 2017-08-30 DIAGNOSIS — R74.01 ALT (SGPT) LEVEL RAISED: ICD-10-CM

## 2017-08-30 DIAGNOSIS — R76.8 POSITIVE ANA (ANTINUCLEAR ANTIBODY): ICD-10-CM

## 2017-08-30 DIAGNOSIS — N39.0 URINARY TRACT INFECTION WITHOUT HEMATURIA, SITE UNSPECIFIED: ICD-10-CM

## 2017-08-30 DIAGNOSIS — L65.9 HAIR LOSS: ICD-10-CM

## 2017-08-30 PROCEDURE — 99214 OFFICE O/P EST MOD 30 MIN: CPT | Performed by: INTERNAL MEDICINE

## 2017-08-30 ASSESSMENT — ENCOUNTER SYMPTOMS
CHILLS: 0
FEVER: 0

## 2017-08-30 NOTE — LETTER
[facility]   [address], [address]  [place] [address]  Phone: [phone]  Fax: [phone]              Encounter Date: 8/30/2017    Dear Dr. Parikh,    It was a pleasure seeing your patient, Aubrey Marlow, on 8/30/2017. Diagnoses of Positive SUMMER (antinuclear antibody), ALT (SGPT) level raised, ESR raised, Hair loss, and Urinary tract infection without hematuria, site unspecified were pertinent to this visit.     Please find attached progress note which includes the history I obtained from Ms. Marlow, my physical examination findings, my impression and recommendations.      Once again, it was a pleasure participating in your patient's care.  Please feel free to contact me if you have any questions or if I can be of any further assistance to your patients.      Sincerely,    Lulu Wilburn M.D.  Electronically Signed          PROGRESS NOTE:  Subjective:   Date of Consultation:8/30/2017  3:49 PM  Primary care physician:SAMY Roach      Reason for Consultation:    Ms. Marlow  is a pleasant 46 y.o. year old female who presented with follow-up for positive SUMMER    Positive SUMMER  She continues to note hair loss, fatigue as well as heat intolerance      Raised ESR  She reports occasional dysuria.  She also notes she was previously treated for UTI.      Elevated ALT  Historically it has been intermittently elevated  She does have an anti mitochondrial antibody that is equivocal.  Previously she was diagnosed with fatty liver      Diabetes  (not addressed)      RHEUM HISTORY:  I first met Ms. Aubrey Marlow with a history of positive SUMMER on 8/2/2017.  She was noted to have a persistently elevated ESR.  She otherwise denied prolonged morning stiffness, photosensitivity, sicca symptoms, or Raynauds or eye inflammation.  She did have a mild rash on the anterior chest.    Pertinent Serologies  Results for AUBREY MARLOW (MRN [id number]) as of 8/2/2017 15:20    Ref. Range  2017 11:28 7/10/2017 09:15   C3 Complement Latest Ref Range: 87.0-200.0 mg/dL   174.0   Complement C4 Latest Ref Range: 19.0-52.0 mg/dL   33.0   Complement Total Hemolytic Latest Ref Range:   Units   185 (H)   Stat C-Reactive Protein Latest Ref Range: 0.00-0.75 mg/dL 0.89 (H)     Anti-Dna -Ds Latest Ref Range: None Detected  None Detected None Detected   Antinuclear Antibody Latest Ref Range: None Detected  Detected (A)     Rnp Antibodies Latest Ref Range: 0-40 AU/mL 0     Smith Antibodies Latest Ref Range: 0-40 AU/mL 0 0   SSA 52 (R0)(ERVIN) Ab, IgG Latest Ref Range: 0-40 AU/mL 8 4   SSA 60 (R0)(ERVIN) Ab, IgG Latest Ref Range: 0-40 AU/mL 4 1   Sjogren'S Anti-Ss-B Latest Ref Range: 0-40 AU/mL 0     SUMMER Titer Latest Ref Range: <1:40  1:5120 (H)        Results for AUBREY JARAMILLO (MRN [id number]) as of 2017 15:25    Ref. Range 10/1/2012 11:58 2015 07:03   Microsomal -Tpo- Abs Latest Ref Range: 0.0-9.0 IU/mL 0.4 0.4   Anti-Thyroglobulin Latest Ref Range: 0.0-4.0 IU/mL <0.9 <0.9   Gliadin Antibodies Iga Latest Ref Range: 0-19 Units 9        Results for AUBREY JARAMILLO (MRN [id number]) as of 2017 19:07   Ref. Range 2017 08:36   HLA-B27 Screen Latest Ref Range: Negative  Negative     Results for AUBREY JARAMILLO (MRN [id number]) as of 2017 19:07   Ref. Range 2017 08:35   Rheumatoid Factor -Neph- Latest Ref Range: 0 - 14 IU/mL <10   Results for AUBREY JARAMILLO (MRN [id number]) as of 2017 19:07   Ref. Range 2017 08:35   Anti-Mitochondrial Ab Latest Ref Range: 0.0 - 20.0 Units 23.8 (H)   Microsomal -Tpo- Abs Latest Ref Range: 0.0 - 9.0 IU/mL 0.6   Anti-Thyroglobulin Latest Ref Range: 0.0 - 4.0 IU/mL <0.9   F-Actin Ab, IgG Latest Ref Range: 0 - 19 Units 18   Anti-Scl-70 Latest Ref Range: 0 - 40 AU/mL 0   Susanne-1 Antibody, IgG Latest Ref Range: 0 - 40 AU/mL 0   Anti-Centromere B Ab Latest Ref Range: 0 - 40 AU/mL 0   Ccp Antibodies Latest Ref Range: 0 - 19 Units 17      Chromatin Ab, IgG Latest Ref Range: 0 - 19 Units 3     Past Medical HIstory: DM, infertility (at age 16 she experienced amenorrhea and diagnosed with PCOS managed with birth control), no miscarriages, gestational diabetes with 3 kids, premature births (@ 33 weeks, @ 37 weeks, @ 38 weeks).        Social History: not a current smoker, no alcohol, school as a , Sikhism     Family History: son has very dry eyes ad has chronic hives, heart stents, DM,    Past Medical History:   Diagnosis Date   • Hypertriglyceridemia 3/17/2010   • Allergy    • Anemia    • Arthritis    • Hyperlipidemia      No past surgical history on file.  Allergies   Allergen Reactions   • Bactrim [Sulfamethoxazole W-Trimethoprim] Unspecified     Rash    • No Known Drug Allergy      Outpatient Encounter Prescriptions as of 8/30/2017   Medication Sig Dispense Refill   • atorvastatin (LIPITOR) 20 MG Tab TAKE 1 TAB BY MOUTH EVERY DAY. 90 Tab 1   • cephALEXin (KEFLEX) 500 MG Cap Take 1 Cap by mouth 2 times a day. 28 Cap 0   • metformin ER (GLUCOPHAGE XR) 500 MG TABLET SR 24 HR Take 2 Tabs by mouth 2 times a day. 120 Tab 5   • DOXYCYCLINE PO Take  by mouth.     • metronidazole (METROCREAM) 0.75 % cream Apply  to affected area(s) 2 times a day.     • ONE TOUCH ULTRA TEST strip USE 4 TIMES A DAY AND AS NEEDED FOR HIGH OR LOW BLOOD SUGAR  3   • Blood Glucose Monitoring Suppl Device Meter: Dispense insurance specific meter. Sig. Use as directed for blood sugar monitoring. #1. NR. DX: E11.9 1 Device 0   • Lancets Misc Lancets order: Lancets for insurance specific meter. Sig: use qid and prn ssx high or low sugar. #100 RF x 3 DX: E11.9 100 Each 3   • Blood Glucose Monitoring Suppl SUPPLIES Misc Test strips order: Test strips for insurance specific meter. Sig: use qid and prn ssx high or low sugar #100 RF x 3. DX: E11.9 100 Each 3   • Multiple Vitamin (DAILY VITAMINS PO) Take  by mouth.     • ibuprofen (MOTRIN) 200 MG Tab Take 200 mg by mouth every  6 hours as needed.     • fluticasone (FLONASE) 50 MCG/ACT nasal spray Spray 1 Spray in nose 2 times a day. Each Nostril 1 Bottle 3   • BLISOVI FE  1-20 MG-MCG per tablet 1 Tab every day.  3   • Cholecalciferol (VITAMIN D) 2000 UNITS Cap Take  by mouth.       No facility-administered encounter medications on file as of 2017.      @IMS@  Social History     Social History   • Marital status:      Spouse name: N/A   • Number of children: 4   • Years of education: N/A     Occupational History   • student early childhood education           Social History Main Topics   • Smoking status: Former Smoker     Packs/day: 0.25     Years: 1.00     Types: Cigarettes     Quit date: 1991   • Smokeless tobacco: Never Used   • Alcohol use No      Comment: Scientologist prohibits   • Drug use: No   • Sexual activity: Yes     Partners: Male     Birth control/ protection: Surgical     Other Topics Concern   • Not on file     Social History Narrative   • No narrative on file      History   Smoking Status   • Former Smoker   • Packs/day: 0.25   • Years: 1.00   • Types: Cigarettes   • Quit date: 1991   Smokeless Tobacco   • Never Used     History   Alcohol Use No     Comment: Scientologist prohibits     History   Drug Use No      OB History    Para Term  AB Living   4 4 1 3       SAB TAB Ectopic Molar Multiple Live Births                    # Outcome Date GA Lbr Nixon/2nd Weight Sex Delivery Anes PTL Lv   4             3             2             1 Term                  Patient's last menstrual period was 2017.    G P A L     Family History   Problem Relation Age of Onset   • Diabetes Mother    • Heart Disease Father    • Hypertension Father    • Hyperlipidemia Father    • Diabetes Brother    • Heart Disease Brother    • Hypertension Brother    • Hyperlipidemia Brother    • Diabetes Maternal Aunt    • Diabetes Maternal Uncle    • Cancer Maternal Grandmother       Cervical/ovarian   • Diabetes Maternal Grandfather    • Heart Disease Maternal Grandfather    • Hypertension Maternal Grandfather    • Hyperlipidemia Maternal Grandfather    • Stroke Maternal Grandfather    • Stroke Paternal Grandmother    • Hyperlipidemia Paternal Grandmother    • Hypertension Paternal Grandmother    • Heart Disease Paternal Grandmother    • Heart Disease Paternal Grandfather    • Hypertension Paternal Grandfather    • Hyperlipidemia Paternal Grandfather        Review of Systems   Constitutional: Negative for chills and fever.   Cardiovascular: Negative for chest pain.   Musculoskeletal: Positive for joint pain.   Skin: Negative for rash.        Objective:   /66   Pulse 90   Temp 36.5 °C (97.7 °F)   Resp 12   Wt 109.8 kg (242 lb)   LMP 08/07/2017   SpO2 94%   Breastfeeding? No   BMI 41.52 kg/m²      Physical Exam   Constitutional: She is oriented to person, place, and time. She appears well-developed and well-nourished. No distress.   HENT:   Head: Normocephalic and atraumatic.   Right Ear: External ear normal.   Left Ear: External ear normal.   Eyes: Conjunctivae are normal. Right eye exhibits no discharge. Left eye exhibits no discharge. No scleral icterus.   Pulmonary/Chest: No stridor. No respiratory distress.   Musculoskeletal: She exhibits no edema.   Neurological: She is alert and oriented to person, place, and time.   Skin: Skin is warm and dry. She is not diaphoretic. No erythema.   Limited skin exam   Psychiatric: She has a normal mood and affect. Her behavior is normal. Judgment and thought content normal.       Assessment:     1. Positive SUMMER (antinuclear antibody)  URINALYSIS,CULTURE IF INDICATED    MITOCHONDRIAL (M2) AB    ANTI-HISTONE ABS    WESTERGREN SED RATE    CRP QUANTITIVE (NON-CARDIAC)    SPEP W/REFLEX TO BRENDA, A, G, M    DX-CHEST-2 VIEWS    ANTI-SMOOTH MUSCLE ABS    IMMUNOGLOBULINS A/G/M SERUM   2. ALT (SGPT) level raised  MITOCHONDRIAL (M2) AB    ANTI-SMOOTH  MUSCLE ABS   3. ESR raised  URINALYSIS,CULTURE IF INDICATED    MITOCHONDRIAL (M2) AB    ANTI-HISTONE ABS    WESTERGREN SED RATE    CRP QUANTITIVE (NON-CARDIAC)    SPEP W/REFLEX TO BRENDA, A, G, M    DX-CHEST-2 VIEWS    ANTI-SMOOTH MUSCLE ABS    IMMUNOGLOBULINS A/G/M SERUM   4. Hair loss     5. Urinary tract infection without hematuria, site unspecified  URINALYSIS,CULTURE IF INDICATED    URINALYSIS,CULTURE IF INDICATED     Labs:  [unfilled]    No results found for: QNTTBGOLD  No results found for: HEPBCORTOT, HEPBCORIGM, HEPBSAG  No results found for: HEPCAB  Lab Results   Component Value Date/Time    SODIUM 134 (L) 08/12/2017 08:35 AM    POTASSIUM 4.0 08/12/2017 08:35 AM    CHLORIDE 106 08/12/2017 08:35 AM    CO2 21 08/12/2017 08:35 AM    GLUCOSE 140 (H) 08/12/2017 08:35 AM    BUN 18 08/12/2017 08:35 AM    CREATININE 0.63 08/12/2017 08:35 AM    CREATININE 0.82 04/11/2016    BUNCREATRAT 23 03/09/2010 07:15 AM    GLOMRATE >59 03/09/2010 07:15 AM      Lab Results   Component Value Date/Time    WBC 5.8 08/12/2017 08:35 AM    WBC 6.9 03/09/2010 07:15 AM    RBC 4.27 08/12/2017 08:35 AM    RBC 4.50 03/09/2010 07:15 AM    HEMOGLOBIN 13.5 08/12/2017 08:35 AM    HEMATOCRIT 39.6 08/12/2017 08:35 AM    MCV 92.7 08/12/2017 08:35 AM    MCV 93 03/09/2010 07:15 AM    MCH 31.6 08/12/2017 08:35 AM    MCH 31.3 03/09/2010 07:15 AM    MCHC 34.1 08/12/2017 08:35 AM    MPV 10.9 08/12/2017 08:35 AM    NEUTSPOLYS 63.00 08/12/2017 08:35 AM    LYMPHOCYTES 27.90 08/12/2017 08:35 AM    MONOCYTES 6.70 08/12/2017 08:35 AM    EOSINOPHILS 1.40 08/12/2017 08:35 AM    BASOPHILS 0.50 08/12/2017 08:35 AM      Lab Results   Component Value Date/Time    CALCIUM 9.3 08/12/2017 08:35 AM    ASTSGOT 35 08/12/2017 08:35 AM    ALTSGPT 57 (H) 08/12/2017 08:35 AM    ALKPHOSPHAT 47 08/12/2017 08:35 AM    TBILIRUBIN 0.5 08/12/2017 08:35 AM    ALBUMIN 3.8 08/12/2017 08:35 AM    TOTPROTEIN 7.0 08/12/2017 08:35 AM     Lab Results   Component Value Date/Time     RHEUMFACTN <10 08/12/2017 08:35 AM    CCPANTIBODY 17 08/12/2017 08:35 AM    ANTINUCAB Detected (A) 06/13/2017 11:28 AM     Lab Results   Component Value Date/Time    SEDRATEWES 40 (H) 08/12/2017 08:35 AM    CREACTPROT 0.66 08/12/2017 08:35 AM     No results found for: RUSSELVIPER, DRVVTINTERP  Lab Results   Component Value Date/Time    [id number] 174.0 07/10/2017 09:15 AM    B5VOXAWNVBP 33.0 07/10/2017 09:15 AM     Lab Results   Component Value Date/Time    ANTIDNADS None Detected 07/10/2017 09:15 AM    RNPAB 0 06/13/2017 11:28 AM    SMITHAB 0 07/10/2017 09:15 AM    WNQEBTW41 0 08/12/2017 08:35 AM    CENTROMBAB 0 08/12/2017 08:35 AM     Lab Results   Component Value Date/Time    ANTIDNADS None Detected 07/10/2017 09:15 AM    A5GKFIDRVXQ 174.0 07/10/2017 09:15 AM    JO1AB 0 08/12/2017 08:35 AM    RNPAB 0 06/13/2017 11:28 AM    ANTISSBSJ 0 06/13/2017 11:28 AM     Lab Results   Component Value Date/Time    COLORURINE Yellow 08/12/2017 08:35 AM    SPECGRAVITY 1.033 08/12/2017 08:35 AM    PHURINE 5.5 08/12/2017 08:35 AM    GLUCOSEUR Negative 08/12/2017 08:35 AM    KETONES Negative 08/12/2017 08:35 AM    PROTEINURIN Negative 08/12/2017 08:35 AM     Lab Results   Component Value Date/Time    TOTPROTUR 22.6 (H) 04/06/2015 07:04 AM     Lab Results   Component Value Date/Time    SSA60 1 07/10/2017 09:15 AM    SSA52 4 07/10/2017 09:15 AM     Lab Results   Component Value Date/Time    HBA1C 6.5 (H) 06/13/2017 11:28 AM     No results found for: CPKTOTAL  No results found for: G6PD  Lab Results   Component Value Date/Time    RKNV91ZZBE Negative 08/12/2017 08:36 AM     No results found for: ACESERUM  Lab Results   Component Value Date/Time    25HYDROXY 48 06/13/2017 11:28 AM     Lab Results   Component Value Date/Time    TSH 0.763 03/09/2010 07:15 AM    FREEDIR 1.01 03/09/2010 07:15 AM     Lab Results   Component Value Date/Time    TSHULTRASEN 1.820 06/30/2016 09:30 AM    FREET4 0.79 04/06/2015 07:03 AM     Lab Results      Component Value Date/Time    MICROSOMALA 0.6 08/12/2017 08:35 AM    ANTITHYROGL <0.9 08/12/2017 08:35 AM     No results found for: IGGLYMEABS  Lab Results   Component Value Date/Time    ANTIMITOCHO 23.8 (H) 08/12/2017 08:35 AM    FACTIN 18 08/12/2017 08:35 AM     Lab Results   Component Value Date/Time     10/01/2012 11:58 AM    TTRANSIGA 8 10/01/2012 11:58 AM     No results found for: FLTYPE, CRYSTALSBDF  No results found for: ISTATICAL  No results found for: ISTATCREAT  No results found for: CTELOPEP  No results found for: GBMABG  No results found for: PTHINTACT      Medical Decision Making:  Today's Assessment / Status / Plan:     Positive SUMMER  Additional work-up was unrevealing.  Her antimitochondrial  Antibody is equivocal.  We did discuss starting plaquenil but she might have a sulfa allergy.  We will continue to monitor for symptoms.  She is on doxycycline and a statin which could be seen with drug induced lupus.  WE will check an anti-histone antibody  I will recheck her antimitochondrial antibody    Raised ESR  Will check CXR for hilar adenopathy  Will check SPEP  She has symptoms and findings of UTI  Will finish treatment and recheck.  When we recheck we will send for culture if positive.    Elevated ALT  We will check a anti-smooth muscle antibody    I have spent greater than 50% of this 30 minute visit in counseling/coordination of care with the patient regarding her differential and therapy plan.    She agreed and verbalized her agreement and understanding with the current plan. I answered all questions and concerns she has at this time and advised her to call at any time in there interim with questions or concerns in regards to her care.    Thank you for allowing me to participate in her care, I will continue to follow closely.

## 2017-10-02 ENCOUNTER — APPOINTMENT (OUTPATIENT)
Dept: RADIOLOGY | Facility: IMAGING CENTER | Age: 47
End: 2017-10-02
Attending: INTERNAL MEDICINE
Payer: COMMERCIAL

## 2017-10-02 ENCOUNTER — HOSPITAL ENCOUNTER (OUTPATIENT)
Dept: LAB | Facility: MEDICAL CENTER | Age: 47
End: 2017-10-02
Attending: INTERNAL MEDICINE
Payer: COMMERCIAL

## 2017-10-02 DIAGNOSIS — R76.8 POSITIVE ANA (ANTINUCLEAR ANTIBODY): ICD-10-CM

## 2017-10-02 DIAGNOSIS — R74.01 ALT (SGPT) LEVEL RAISED: ICD-10-CM

## 2017-10-02 DIAGNOSIS — R70.0 ESR RAISED: ICD-10-CM

## 2017-10-02 DIAGNOSIS — N39.0 URINARY TRACT INFECTION WITHOUT HEMATURIA, SITE UNSPECIFIED: ICD-10-CM

## 2017-10-02 LAB
APPEARANCE UR: CLEAR
BACTERIA #/AREA URNS HPF: NEGATIVE /HPF
BILIRUB UR QL STRIP.AUTO: NEGATIVE
COLOR UR: YELLOW
CRP SERPL HS-MCNC: 0.78 MG/DL (ref 0–0.75)
CULTURE IF INDICATED INDCX: YES UA CULTURE
EPI CELLS #/AREA URNS HPF: ABNORMAL /HPF
ERYTHROCYTE [SEDIMENTATION RATE] IN BLOOD BY WESTERGREN METHOD: 35 MM/HOUR (ref 0–20)
GLUCOSE UR STRIP.AUTO-MCNC: NEGATIVE MG/DL
HYALINE CASTS #/AREA URNS LPF: ABNORMAL /LPF
KETONES UR STRIP.AUTO-MCNC: NEGATIVE MG/DL
LEUKOCYTE ESTERASE UR QL STRIP.AUTO: ABNORMAL
MICRO URNS: ABNORMAL
NITRITE UR QL STRIP.AUTO: NEGATIVE
PH UR STRIP.AUTO: 6 [PH]
PROT UR QL STRIP: 30 MG/DL
RBC # URNS HPF: ABNORMAL /HPF
RBC UR QL AUTO: NEGATIVE
SP GR UR STRIP.AUTO: 1.02
UROBILINOGEN UR STRIP.AUTO-MCNC: NORMAL MG/DL
WBC #/AREA URNS HPF: ABNORMAL /HPF

## 2017-10-02 PROCEDURE — 36415 COLL VENOUS BLD VENIPUNCTURE: CPT

## 2017-10-02 PROCEDURE — 82784 ASSAY IGA/IGD/IGG/IGM EACH: CPT

## 2017-10-02 PROCEDURE — 87086 URINE CULTURE/COLONY COUNT: CPT

## 2017-10-02 PROCEDURE — 84165 PROTEIN E-PHORESIS SERUM: CPT

## 2017-10-02 PROCEDURE — 86140 C-REACTIVE PROTEIN: CPT

## 2017-10-02 PROCEDURE — 85652 RBC SED RATE AUTOMATED: CPT

## 2017-10-02 PROCEDURE — 86255 FLUORESCENT ANTIBODY SCREEN: CPT

## 2017-10-02 PROCEDURE — 81001 URINALYSIS AUTO W/SCOPE: CPT

## 2017-10-02 PROCEDURE — 71020 DX-CHEST-2 VIEWS: CPT | Mod: TC | Performed by: FAMILY MEDICINE

## 2017-10-02 PROCEDURE — 84160 ASSAY OF PROTEIN ANY SOURCE: CPT

## 2017-10-02 PROCEDURE — 83516 IMMUNOASSAY NONANTIBODY: CPT

## 2017-10-03 LAB
IGA SERPL-MCNC: 334 MG/DL (ref 68–408)
IGG SERPL-MCNC: 1230 MG/DL (ref 768–1632)
IGM SERPL-MCNC: 83 MG/DL (ref 35–263)
MITOCHONDRIA M2 IGG SER-ACNC: 25.9 UNITS (ref 0–20)
SMA IGG SER-ACNC: 16 UNITS (ref 0–19)

## 2017-10-04 LAB
ALBUMIN SERPL-MCNC: 4.1 G/DL (ref 3.75–5.01)
ALPHA1 GLOB SERPL ELPH-MCNC: 0.33 G/DL (ref 0.19–0.46)
ALPHA2 GLOB SERPL ELPH-MCNC: 0.93 G/DL (ref 0.48–1.05)
B-GLOBULIN SERPL ELPH-MCNC: 0.92 G/DL (ref 0.48–1.1)
BACTERIA UR CULT: NORMAL
GAMMA GLOB SERPL ELPH-MCNC: 1.32 G/DL (ref 0.62–1.51)
INTERPRETATION SERPL IFE-IMP: NORMAL
MONOCLON BAND OBS SERPL: NORMAL
PATHOLOGY STUDY: NORMAL
PROT SERPL-MCNC: 7.6 G/DL (ref 6–8.3)
SIGNIFICANT IND 70042: NORMAL
SOURCE SOURCE: NORMAL

## 2017-10-05 LAB — HISTONE IGG SER IA-ACNC: 0.5 UNITS (ref 0–0.9)

## 2017-10-14 ASSESSMENT — ENCOUNTER SYMPTOMS
FEVER: 0
CHILLS: 0

## 2017-10-15 NOTE — PROGRESS NOTES
Subjective:   Date of Consultation:10/17/2017  3:30 PM  Primary care physician:SAMY Roach      Reason for Consultation:    Ms. Jaramillo  is a pleasant 46 y.o. year old female who presented with follow-up for positive SUMMER    Positive SUMMER  She continues to note hair loss, fatigue.  She continues to have arthralgias as well.       Raised ESR  She denies recent infection    Elevated ALT  Historically it has been intermittently elevated  She does have an anti mitochondrial antibody that is equivocal.  Previously she was diagnosed with fatty liver  Since our last visit she has had increasing right upper quadrant pain  She did have an ultrasound in 2016 and noted fatty liver    Abnormal UA  Noted on recent labs    Rash/Rosacea  She was started on doxycycline.  Since last visit in August she has decreased doxycycline to two pills a week.  Her Rosacea is not acting up.  Her joint pain are the same with no change.      Diabetes  (not addressed)      RHEUM HISTORY:  I first met Ms. Radha Jaramillo with a history of positive SUMMER on 2017.  She was noted to have a persistently elevated ESR.  She otherwise denied prolonged morning stiffness, photosensitivity, sicca symptoms, or Raynauds or eye inflammation.  She did have a mild rash on the anterior chest.    Pertinent Serologies  Results for RADHA JARAMILLO (MRN [id number]) as of 2017 15:20    Ref. Range 2017 11:28 7/10/2017 09:15   C3 Complement Latest Ref Range: 87.0-200.0 mg/dL   174.0   Complement C4 Latest Ref Range: 19.0-52.0 mg/dL   33.0   Complement Total Hemolytic Latest Ref Range:   Units   185 (H)   Stat C-Reactive Protein Latest Ref Range: 0.00-0.75 mg/dL 0.89 (H)     Anti-Dna -Ds Latest Ref Range: None Detected  None Detected None Detected   Antinuclear Antibody Latest Ref Range: None Detected  Detected (A)     Rnp Antibodies Latest Ref Range: 0-40 AU/mL 0     Smith Antibodies Latest Ref Range: 0-40 AU/mL 0 0   SSA 52  (R0)(ERVIN) Ab, IgG Latest Ref Range: 0-40 AU/mL 8 4   SSA 60 (R0)(ERVIN) Ab, IgG Latest Ref Range: 0-40 AU/mL 4 1   Sjogren'S Anti-Ss-B Latest Ref Range: 0-40 AU/mL 0     SUMMER Titer Latest Ref Range: <1:40  1:5120 (H)        Results for AUBREY JARAMILLO (MRN [id number]) as of 8/2/2017 15:25    Ref. Range 10/1/2012 11:58 4/6/2015 07:03   Microsomal -Tpo- Abs Latest Ref Range: 0.0-9.0 IU/mL 0.4 0.4   Anti-Thyroglobulin Latest Ref Range: 0.0-4.0 IU/mL <0.9 <0.9   Gliadin Antibodies Iga Latest Ref Range: 0-19 Units 9        Results for AUBREY JARAMILLO (MRN [id number]) as of 8/28/2017 19:07   Ref. Range 8/12/2017 08:36   HLA-B27 Screen Latest Ref Range: Negative  Negative     Results for AUBREY JARAMILLO (MRN [id number]) as of 8/28/2017 19:07   Ref. Range 8/12/2017 08:35   Rheumatoid Factor -Neph- Latest Ref Range: 0 - 14 IU/mL <10   Results for AUBREY JARAMILLO (MRN [id number]) as of 8/28/2017 19:07   Ref. Range 8/12/2017 08:35   Anti-Mitochondrial Ab Latest Ref Range: 0.0 - 20.0 Units 23.8 (H)   Microsomal -Tpo- Abs Latest Ref Range: 0.0 - 9.0 IU/mL 0.6   Anti-Thyroglobulin Latest Ref Range: 0.0 - 4.0 IU/mL <0.9   F-Actin Ab, IgG Latest Ref Range: 0 - 19 Units 18   Anti-Scl-70 Latest Ref Range: 0 - 40 AU/mL 0   Susanne-1 Antibody, IgG Latest Ref Range: 0 - 40 AU/mL 0   Anti-Centromere B Ab Latest Ref Range: 0 - 40 AU/mL 0   Ccp Antibodies Latest Ref Range: 0 - 19 Units 17   Chromatin Ab, IgG Latest Ref Range: 0 - 19 Units 3     Results for AUBREY JARAMILLO (MRN [id number]) as of 10/17/2017 15:55   Ref. Range 10/2/2017 08:55   Anti-Mitochondrial Ab Latest Ref Range: 0.0 - 20.0 Units 25.9 (H)   F-Actin Ab, IgG Latest Ref Range: 0 - 19 Units 16   Anti-Histone Antibodies Latest Ref Range: 0.0 - 0.9 Units 0.5           Past Medical HIstory: DM, infertility (at age 16 she experienced amenorrhea and diagnosed with PCOS managed with birth control), no miscarriages, gestational diabetes with 3 kids, premature births (@ 33 weeks, @  37 weeks, @ 38 weeks).        Social History: not a current smoker, no alcohol, school as a , [place]     Family History: son has very dry eyes ad has chronic hives, heart stents, DM,    Past Medical History:   Diagnosis Date   • Hypertriglyceridemia 3/17/2010   • Allergy    • Anemia    • Arthritis    • Hyperlipidemia      No past surgical history on file.  Allergies   Allergen Reactions   • Bactrim [Sulfamethoxazole W-Trimethoprim] Unspecified     Rash    • No Known Drug Allergy      Outpatient Encounter Prescriptions as of 10/17/2017   Medication Sig Dispense Refill   • atorvastatin (LIPITOR) 20 MG Tab TAKE 1 TAB BY MOUTH EVERY DAY. 90 Tab 1   • metformin ER (GLUCOPHAGE XR) 500 MG TABLET SR 24 HR Take 2 Tabs by mouth 2 times a day. 120 Tab 5   • DOXYCYCLINE PO Take  by mouth.     • metronidazole (METROCREAM) 0.75 % cream Apply  to affected area(s) 2 times a day.     • ONE TOUCH ULTRA TEST strip USE 4 TIMES A DAY AND AS NEEDED FOR HIGH OR LOW BLOOD SUGAR  3   • Blood Glucose Monitoring Suppl Device Meter: Dispense insurance specific meter. Sig. Use as directed for blood sugar monitoring. #1. NR. DX: E11.9 1 Device 0   • Lancets Misc Lancets order: Lancets for insurance specific meter. Sig: use qid and prn ssx high or low sugar. #100 RF x 3 DX: E11.9 100 Each 3   • Blood Glucose Monitoring Suppl SUPPLIES Misc Test strips order: Test strips for insurance specific meter. Sig: use qid and prn ssx high or low sugar #100 RF x 3. DX: E11.9 100 Each 3   • Multiple Vitamin (DAILY VITAMINS PO) Take  by mouth.     • cephALEXin (KEFLEX) 500 MG Cap Take 1 Cap by mouth 2 times a day. 28 Cap 0   • ibuprofen (MOTRIN) 200 MG Tab Take 200 mg by mouth every 6 hours as needed.     • fluticasone (FLONASE) 50 MCG/ACT nasal spray Spray 1 Spray in nose 2 times a day. Each Nostril 1 Bottle 3   • BLISOVI FE 1/20 1-20 MG-MCG per tablet 1 Tab every day.  3   • Cholecalciferol (VITAMIN D) 2000 UNITS Cap Take  by mouth.       No  facility-administered encounter medications on file as of 10/17/2017.        Social History     Social History   • Marital status:      Spouse name: N/A   • Number of children: 4   • Years of education: N/A     Occupational History   • student early childhood education           Social History Main Topics   • Smoking status: Former Smoker     Packs/day: 0.25     Years: 1.00     Types: Cigarettes     Quit date: 1991   • Smokeless tobacco: Never Used   • Alcohol use No      Comment: [place] prohibits   • Drug use: No   • Sexual activity: Yes     Partners: Male     Birth control/ protection: Surgical     Other Topics Concern   • Not on file     Social History Narrative   • No narrative on file      History   Smoking Status   • Former Smoker   • Packs/day: 0.25   • Years: 1.00   • Types: Cigarettes   • Quit date: 1991   Smokeless Tobacco   • Never Used     History   Alcohol Use No     Comment: [employer] prohibits     History   Drug Use No      OB History    Para Term  AB Living   4 4 1 3       SAB TAB Ectopic Molar Multiple Live Births                    # Outcome Date GA Lbr Nixon/2nd Weight Sex Delivery Anes PTL Lv   4             3             2             1 Term                  Patient's last menstrual period was 09/15/2017.    G P A L     Family History   Problem Relation Age of Onset   • Diabetes Mother    • Heart Disease Father    • Hypertension Father    • Hyperlipidemia Father    • Diabetes Brother    • Heart Disease Brother    • Hypertension Brother    • Hyperlipidemia Brother    • Diabetes Maternal Aunt    • Diabetes Maternal Uncle    • Cancer Maternal Grandmother      Cervical/ovarian   • Diabetes Maternal Grandfather    • Heart Disease Maternal Grandfather    • Hypertension Maternal Grandfather    • Hyperlipidemia Maternal Grandfather    • Stroke Maternal Grandfather    • Stroke Paternal Grandmother    • Hyperlipidemia Paternal Grandmother     • Hypertension Paternal Grandmother    • Heart Disease Paternal Grandmother    • Heart Disease Paternal Grandfather    • Hypertension Paternal Grandfather    • Hyperlipidemia Paternal Grandfather        Review of Systems   Constitutional: Negative for chills and fever.   Cardiovascular: Negative for chest pain.   Musculoskeletal: Positive for joint pain.   Skin: Negative for rash.        Objective:   /70   Pulse 84   Temp 36.6 °C (97.8 °F)   Resp 12   Wt 109.3 kg (241 lb)   LMP 09/15/2017   SpO2 94%   Breastfeeding? No   BMI 41.35 kg/m²     Physical Exam   Constitutional: She is oriented to person, place, and time. She appears well-developed and well-nourished. No distress.   HENT:   Head: Normocephalic and atraumatic.   Right Ear: External ear normal.   Left Ear: External ear normal.   Eyes: Conjunctivae are normal. Right eye exhibits no discharge. Left eye exhibits no discharge. No scleral icterus.   Pulmonary/Chest: No stridor. No respiratory distress. She has no wheezes.   Musculoskeletal: She exhibits no edema.   Neurological: She is alert and oriented to person, place, and time.   Skin: Skin is warm and dry. She is not diaphoretic. No erythema.   Limited skin exam.  No overt malar rash   Psychiatric: She has a normal mood and affect. Her behavior is normal. Judgment and thought content normal.       Assessment:     1. Proteinuria, unspecified type  URINALYSIS    REFERRAL TO GASTROENTEROLOGY   2. ALT (SGPT) level raised  URINALYSIS    REFERRAL TO GASTROENTEROLOGY    HEPATIC FUNCTION PANEL   3. Positive SUMMER (antinuclear antibody)     4. ESR raised       Labs:  [unfilled]    No results found for: QNTTBGOLD  No results found for: HEPBCORTOT, HEPBCORIGM, HEPBSAG  No results found for: HEPCAB  Lab Results   Component Value Date/Time    SODIUM 134 (L) 08/12/2017 08:35 AM    POTASSIUM 4.0 08/12/2017 08:35 AM    CHLORIDE 106 08/12/2017 08:35 AM    CO2 21 08/12/2017 08:35 AM    GLUCOSE 140 (H) 08/12/2017  08:35 AM    BUN 18 08/12/2017 08:35 AM    CREATININE 0.63 08/12/2017 08:35 AM    CREATININE 0.82 04/11/2016    BUNCREATRAT 23 03/09/2010 07:15 AM    GLOMRATE >59 03/09/2010 07:15 AM      Lab Results   Component Value Date/Time    WBC 5.8 08/12/2017 08:35 AM    WBC 6.9 03/09/2010 07:15 AM    RBC 4.27 08/12/2017 08:35 AM    RBC 4.50 03/09/2010 07:15 AM    HEMOGLOBIN 13.5 08/12/2017 08:35 AM    HEMATOCRIT 39.6 08/12/2017 08:35 AM    MCV 92.7 08/12/2017 08:35 AM    MCV 93 03/09/2010 07:15 AM    MCH 31.6 08/12/2017 08:35 AM    MCH 31.3 03/09/2010 07:15 AM    MCHC 34.1 08/12/2017 08:35 AM    MPV 10.9 08/12/2017 08:35 AM    NEUTSPOLYS 63.00 08/12/2017 08:35 AM    LYMPHOCYTES 27.90 08/12/2017 08:35 AM    MONOCYTES 6.70 08/12/2017 08:35 AM    EOSINOPHILS 1.40 08/12/2017 08:35 AM    BASOPHILS 0.50 08/12/2017 08:35 AM      Lab Results   Component Value Date/Time    CALCIUM 9.3 08/12/2017 08:35 AM    ASTSGOT 35 08/12/2017 08:35 AM    ALTSGPT 57 (H) 08/12/2017 08:35 AM    ALKPHOSPHAT 47 08/12/2017 08:35 AM    TBILIRUBIN 0.5 08/12/2017 08:35 AM    ALBUMIN 4.10 10/02/2017 08:55 AM    TOTPROTEIN 7.60 10/02/2017 08:55 AM     Lab Results   Component Value Date/Time    RHEUMFACTN <10 08/12/2017 08:35 AM    CCPANTIBODY 17 08/12/2017 08:35 AM    ANTINUCAB Detected (A) 06/13/2017 11:28 AM     Lab Results   Component Value Date/Time    SEDRATEWES 35 (H) 10/02/2017 08:55 AM    CREACTPROT 0.78 (H) 10/02/2017 08:55 AM     No results found for: DR MILTONVVTINTERP  Lab Results   Component Value Date/Time    F2DQYFWYJRS 174.0 07/10/2017 09:15 AM    Z6HUJKSPEUK 33.0 07/10/2017 09:15 AM     Lab Results   Component Value Date/Time    ANTIDNADS None Detected 07/10/2017 09:15 AM    RNPAB 0 06/13/2017 11:28 AM    SMITHAB 0 07/10/2017 09:15 AM    [id number] 0 08/12/2017 08:35 AM    CENTROMBAB 0 08/12/2017 08:35 AM     Lab Results   Component Value Date/Time    ANTIDNADS None Detected 07/10/2017 09:15 AM    I0ZLUVMRUGC 174.0 07/10/2017 09:15 AM     JO1AB 0 08/12/2017 08:35 AM    RNPAB 0 06/13/2017 11:28 AM    ANTISSBSJ 0 06/13/2017 11:28 AM     Lab Results   Component Value Date/Time    COLORURINE Yellow 10/02/2017 08:59 AM    SPECGRAVITY 1.020 10/02/2017 08:59 AM    PHURINE 6.0 10/02/2017 08:59 AM    GLUCOSEUR Negative 10/02/2017 08:59 AM    KETONES Negative 10/02/2017 08:59 AM    PROTEINURIN 30 (A) 10/02/2017 08:59 AM     Lab Results   Component Value Date/Time    TOTPROTUR 22.6 (H) 04/06/2015 07:04 AM     Lab Results   Component Value Date/Time    SSA60 1 07/10/2017 09:15 AM    SSA52 4 07/10/2017 09:15 AM     Lab Results   Component Value Date/Time    HBA1C 6.5 (H) 06/13/2017 11:28 AM     No results found for: CPKTOTAL  No results found for: G6PD  Lab Results   Component Value Date/Time    [id number] Negative 08/12/2017 08:36 AM     No results found for: ACESERUM  Lab Results   Component Value Date/Time    25HYDROXY 48 06/13/2017 11:28 AM     Lab Results   Component Value Date/Time    TSH 0.763 03/09/2010 07:15 AM    FREEDIR 1.01 03/09/2010 07:15 AM     Lab Results   Component Value Date/Time    TSHULTRASEN 1.820 06/30/2016 09:30 AM    FREET4 0.79 04/06/2015 07:03 AM     Lab Results   Component Value Date/Time    MICROSOMALA 0.6 08/12/2017 08:35 AM    ANTITHYROGL <0.9 08/12/2017 08:35 AM     No results found for: IGGLYMEABS  Lab Results   Component Value Date/Time    ANTIMITOCHO 25.9 (H) 10/02/2017 08:55 AM    FACTIN 16 10/02/2017 08:55 AM     Lab Results   Component Value Date/Time     10/02/2017 08:55 AM    TTRANSIGA 8 10/01/2012 11:58 AM     No results found for: FLTYPE, CRYSTALSBDF  No results found for: ISTATICAL  No results found for: ISTATCREAT  No results found for: CTELOPEP  No results found for: GBMABG  No results found for: PTHINTACT      Medical Decision Making:  Today's Assessment / Status / Plan:     Positive SUMMER  Additional work-up was unrevealing.  Anti histone was negative.  She did decrease doxycycline with on symptoms.  Will  continue to monitor for new symptoms.  Her f-actin is positive will consider autoimmune hepatitis.    Raised ESR  CXR did not show hilar adenopathy  SPEP normal  UA is abnormal  Will repeat UA and if abnormal consider checking anti GBM, ANCA and referral to nephrology    Elevated ALT  Anti smooth muscle antibody is elevated at 25.9  With intermittent ALT elevation  Will refer to GI for autoimmune hepatitis    1. Proteinuria, unspecified type  URINALYSIS    REFERRAL TO GASTROENTEROLOGY   2. ALT (SGPT) level raised  URINALYSIS    REFERRAL TO GASTROENTEROLOGY    HEPATIC FUNCTION PANEL   3. Positive SUMMER (antinuclear antibody)     4. ESR raised       Return in about 10 weeks (around 12/26/2017).      I have spent greater than 50% of this 30 minute visit in counseling/coordination of care with the patient regarding her differential and therapy plan.    She agreed and verbalized her agreement and understanding with the current plan. I answered all questions and concerns she has at this time and advised her to call at any time in there interim with questions or concerns in regards to her care.    Thank you for allowing me to participate in her care, I will continue to follow closely.

## 2017-10-17 ENCOUNTER — OFFICE VISIT (OUTPATIENT)
Dept: RHEUMATOLOGY | Facility: PHYSICIAN GROUP | Age: 47
End: 2017-10-17
Payer: COMMERCIAL

## 2017-10-17 VITALS
WEIGHT: 241 LBS | DIASTOLIC BLOOD PRESSURE: 70 MMHG | BODY MASS INDEX: 41.35 KG/M2 | OXYGEN SATURATION: 94 % | HEART RATE: 84 BPM | SYSTOLIC BLOOD PRESSURE: 110 MMHG | TEMPERATURE: 97.8 F | RESPIRATION RATE: 12 BRPM

## 2017-10-17 DIAGNOSIS — R80.9 PROTEINURIA, UNSPECIFIED TYPE: ICD-10-CM

## 2017-10-17 DIAGNOSIS — R70.0 ESR RAISED: ICD-10-CM

## 2017-10-17 DIAGNOSIS — R76.8 POSITIVE ANA (ANTINUCLEAR ANTIBODY): ICD-10-CM

## 2017-10-17 DIAGNOSIS — R74.01 ALT (SGPT) LEVEL RAISED: ICD-10-CM

## 2017-10-17 PROCEDURE — 99214 OFFICE O/P EST MOD 30 MIN: CPT | Performed by: INTERNAL MEDICINE

## 2017-10-17 NOTE — LETTER
[facility]   [address], [address]  [place] [address]  Phone: [phone]  Fax: [phone]              Encounter Date: 10/17/2017    Dear Dr. Parikh,    It was a pleasure seeing your patient, Radha Marlow, on 10/17/2017. Diagnoses of Proteinuria, unspecified type, ALT (SGPT) level raised, Positive SUMMER (antinuclear antibody), and ESR raised were pertinent to this visit.     Please find attached progress note which includes the history I obtained from Ms. Marlow, my physical examination findings, my impression and recommendations.      Once again, it was a pleasure participating in your patient's care.  Please feel free to contact me if you have any questions or if I can be of any further assistance to your patients.      Sincerely,    Lulu Wilburn M.D.  Electronically Signed          PROGRESS NOTE:  Subjective:   Date of Consultation:10/17/2017  3:30 PM  Primary care physician:SAMY Roach      Reason for Consultation:    Ms. Marlow  is a pleasant 46 y.o. year old female who presented with follow-up for positive SUMMER    Positive SUMMER  She continues to note hair loss, fatigue.  She continues to have arthralgias as well.       Raised ESR  She denies recent infection    Elevated ALT  Historically it has been intermittently elevated  She does have an anti mitochondrial antibody that is equivocal.  Previously she was diagnosed with fatty liver  Since our last visit she has had increasing right upper quadrant pain  She did have an ultrasound in 8/2016 and noted fatty liver    Abnormal UA  Noted on recent labs    Rash/Rosacea  She was started on doxycycline.  Since last visit in August she has decreased doxycycline to two pills a week.  Her Rosacea is not acting up.  Her joint pain are the same with no change.      Diabetes  (not addressed)      RHEUM HISTORY:  I first met Ms. Radha Marlow with a history of positive SUMMER on 8/2/2017.  She was noted to have a  persistently elevated ESR.  She otherwise denied prolonged morning stiffness, photosensitivity, sicca symptoms, or Raynauds or eye inflammation.  She did have a mild rash on the anterior chest.    Pertinent Serologies  Results for AUBREY JARAMILLO (MRN [id number]) as of 2017 15:20    Ref. Range 2017 11:28 7/10/2017 09:15   C3 Complement Latest Ref Range: 87.0-200.0 mg/dL   174.0   Complement C4 Latest Ref Range: 19.0-52.0 mg/dL   33.0   Complement Total Hemolytic Latest Ref Range:   Units   185 (H)   Stat C-Reactive Protein Latest Ref Range: 0.00-0.75 mg/dL 0.89 (H)     Anti-Dna -Ds Latest Ref Range: None Detected  None Detected None Detected   Antinuclear Antibody Latest Ref Range: None Detected  Detected (A)     Rnp Antibodies Latest Ref Range: 0-40 AU/mL 0     Smith Antibodies Latest Ref Range: 0-40 AU/mL 0 0   SSA 52 (R0)(ERVIN) Ab, IgG Latest Ref Range: 0-40 AU/mL 8 4   SSA 60 (R0)(ERVIN) Ab, IgG Latest Ref Range: 0-40 AU/mL 4 1   Sjogren'S Anti-Ss-B Latest Ref Range: 0-40 AU/mL 0     SUMMER Titer Latest Ref Range: <1:40  1:5120 (H)        Results for AUBREY JARAMILLO (MRN [id number]) as of 2017 15:25    Ref. Range 10/1/2012 11:58 2015 07:03   Microsomal -Tpo- Abs Latest Ref Range: 0.0-9.0 IU/mL 0.4 0.4   Anti-Thyroglobulin Latest Ref Range: 0.0-4.0 IU/mL <0.9 <0.9   Gliadin Antibodies Iga Latest Ref Range: 0-19 Units 9        Results for AUBREY JARAMILLO (MRN [id number]) as of 2017 19:07   Ref. Range 2017 08:36   HLA-B27 Screen Latest Ref Range: Negative  Negative     Results for AUBREY JARAMILLO (MRN [id number]) as of 2017 19:07   Ref. Range 2017 08:35   Rheumatoid Factor -Neph- Latest Ref Range: 0 - 14 IU/mL <10   Results for AUBREY JARAMILLO (MRN [id number]) as of 2017 19:07   Ref. Range 2017 08:35   Anti-Mitochondrial Ab Latest Ref Range: 0.0 - 20.0 Units 23.8 (H)   Microsomal -Tpo- Abs Latest Ref Range: 0.0 - 9.0 IU/mL 0.6   Anti-Thyroglobulin Latest  Ref Range: 0.0 - 4.0 IU/mL <0.9   F-Actin Ab, IgG Latest Ref Range: 0 - 19 Units 18   Anti-Scl-70 Latest Ref Range: 0 - 40 AU/mL 0   Susanne-1 Antibody, IgG Latest Ref Range: 0 - 40 AU/mL 0   Anti-Centromere B Ab Latest Ref Range: 0 - 40 AU/mL 0   Ccp Antibodies Latest Ref Range: 0 - 19 Units 17   Chromatin Ab, IgG Latest Ref Range: 0 - 19 Units 3     Results for AUBREY JARAMILLO (MRN [id number]) as of 10/17/2017 15:55   Ref. Range 10/2/2017 08:55   Anti-Mitochondrial Ab Latest Ref Range: 0.0 - 20.0 Units 25.9 (H)   F-Actin Ab, IgG Latest Ref Range: 0 - 19 Units 16   Anti-Histone Antibodies Latest Ref Range: 0.0 - 0.9 Units 0.5           Past Medical HIstory: DM, infertility (at age 16 she experienced amenorrhea and diagnosed with PCOS managed with birth control), no miscarriages, gestational diabetes with 3 kids, premature births (@ 33 weeks, @ 37 weeks, @ 38 weeks).        Social History: not a current smoker, no alcohol, school as a , Confucianism     Family History: son has very dry eyes ad has chronic hives, heart stents, DM,    Past Medical History:   Diagnosis Date   • Hypertriglyceridemia 3/17/2010   • Allergy    • Anemia    • Arthritis    • Hyperlipidemia      No past surgical history on file.  Allergies   Allergen Reactions   • Bactrim [Sulfamethoxazole W-Trimethoprim] Unspecified     Rash    • No Known Drug Allergy      Outpatient Encounter Prescriptions as of 10/17/2017   Medication Sig Dispense Refill   • atorvastatin (LIPITOR) 20 MG Tab TAKE 1 TAB BY MOUTH EVERY DAY. 90 Tab 1   • metformin ER (GLUCOPHAGE XR) 500 MG TABLET SR 24 HR Take 2 Tabs by mouth 2 times a day. 120 Tab 5   • DOXYCYCLINE PO Take  by mouth.     • metronidazole (METROCREAM) 0.75 % cream Apply  to affected area(s) 2 times a day.     • ONE TOUCH ULTRA TEST strip USE 4 TIMES A DAY AND AS NEEDED FOR HIGH OR LOW BLOOD SUGAR  3   • Blood Glucose Monitoring Suppl Device Meter: Dispense insurance specific meter. Sig. Use as directed for  blood sugar monitoring. #1. NR. DX: E11.9 1 Device 0   • Lancets Misc Lancets order: Lancets for insurance specific meter. Sig: use qid and prn ssx high or low sugar. #100 RF x 3 DX: E11.9 100 Each 3   • Blood Glucose Monitoring Suppl SUPPLIES Misc Test strips order: Test strips for insurance specific meter. Sig: use qid and prn ssx high or low sugar #100 RF x 3. DX: E11.9 100 Each 3   • Multiple Vitamin (DAILY VITAMINS PO) Take  by mouth.     • cephALEXin (KEFLEX) 500 MG Cap Take 1 Cap by mouth 2 times a day. 28 Cap 0   • ibuprofen (MOTRIN) 200 MG Tab Take 200 mg by mouth every 6 hours as needed.     • fluticasone (FLONASE) 50 MCG/ACT nasal spray Spray 1 Spray in nose 2 times a day. Each Nostril 1 Bottle 3   • BLISOVI FE  1-20 MG-MCG per tablet 1 Tab every day.  3   • Cholecalciferol (VITAMIN D) 2000 UNITS Cap Take  by mouth.       No facility-administered encounter medications on file as of 10/17/2017.        Social History     Social History   • Marital status:      Spouse name: N/A   • Number of children: 4   • Years of education: N/A     Occupational History   • student early childhood education           Social History Main Topics   • Smoking status: Former Smoker     Packs/day: 0.25     Years: 1.00     Types: Cigarettes     Quit date: 1991   • Smokeless tobacco: Never Used   • Alcohol use No      Comment: [facility] prohibits   • Drug use: No   • Sexual activity: Yes     Partners: Male     Birth control/ protection: Surgical     Other Topics Concern   • Not on file     Social History Narrative   • No narrative on file      History   Smoking Status   • Former Smoker   • Packs/day: 0.25   • Years: 1.00   • Types: Cigarettes   • Quit date: 1991   Smokeless Tobacco   • Never Used     History   Alcohol Use No     Comment: [facility] prohibits     History   Drug Use No      OB History    Para Term  AB Living   4 4 1 3       SAB TAB Ectopic Molar Multiple Live Births                     # Outcome Date GA Lbr Nixon/2nd Weight Sex Delivery Anes PTL Lv   4             3             2             1 Term                  Patient's last menstrual period was 09/15/2017.    G P A L     Family History   Problem Relation Age of Onset   • Diabetes Mother    • Heart Disease Father    • Hypertension Father    • Hyperlipidemia Father    • Diabetes Brother    • Heart Disease Brother    • Hypertension Brother    • Hyperlipidemia Brother    • Diabetes Maternal Aunt    • Diabetes Maternal Uncle    • Cancer Maternal Grandmother      Cervical/ovarian   • Diabetes Maternal Grandfather    • Heart Disease Maternal Grandfather    • Hypertension Maternal Grandfather    • Hyperlipidemia Maternal Grandfather    • Stroke Maternal Grandfather    • Stroke Paternal Grandmother    • Hyperlipidemia Paternal Grandmother    • Hypertension Paternal Grandmother    • Heart Disease Paternal Grandmother    • Heart Disease Paternal Grandfather    • Hypertension Paternal Grandfather    • Hyperlipidemia Paternal Grandfather        Review of Systems   Constitutional: Negative for chills and fever.   Cardiovascular: Negative for chest pain.   Musculoskeletal: Positive for joint pain.   Skin: Negative for rash.        Objective:   /70   Pulse 84   Temp 36.6 °C (97.8 °F)   Resp 12   Wt 109.3 kg (241 lb)   LMP 09/15/2017   SpO2 94%   Breastfeeding? No   BMI 41.35 kg/m²      Physical Exam   Constitutional: She is oriented to person, place, and time. She appears well-developed and well-nourished. No distress.   HENT:   Head: Normocephalic and atraumatic.   Right Ear: External ear normal.   Left Ear: External ear normal.   Eyes: Conjunctivae are normal. Right eye exhibits no discharge. Left eye exhibits no discharge. No scleral icterus.   Pulmonary/Chest: No stridor. No respiratory distress. She has no wheezes.   Musculoskeletal: She exhibits no edema.   Neurological: She is alert and oriented to  person, place, and time.   Skin: Skin is warm and dry. She is not diaphoretic. No erythema.   Limited skin exam.  No overt malar rash   Psychiatric: She has a normal mood and affect. Her behavior is normal. Judgment and thought content normal.       Assessment:     1. Proteinuria, unspecified type  URINALYSIS    REFERRAL TO GASTROENTEROLOGY   2. ALT (SGPT) level raised  URINALYSIS    REFERRAL TO GASTROENTEROLOGY    HEPATIC FUNCTION PANEL   3. Positive SUMMER (antinuclear antibody)     4. ESR raised       Labs:  [unfilled]    No results found for: QNTTBGOLD  No results found for: HEPBCORTOT, HEPBCORIGM, HEPBSAG  No results found for: HEPCAB  Lab Results   Component Value Date/Time    SODIUM 134 (L) 08/12/2017 08:35 AM    POTASSIUM 4.0 08/12/2017 08:35 AM    CHLORIDE 106 08/12/2017 08:35 AM    CO2 21 08/12/2017 08:35 AM    GLUCOSE 140 (H) 08/12/2017 08:35 AM    BUN 18 08/12/2017 08:35 AM    CREATININE 0.63 08/12/2017 08:35 AM    CREATININE 0.82 04/11/2016    BUNCREATRAT 23 03/09/2010 07:15 AM    GLOMRATE >59 03/09/2010 07:15 AM      Lab Results   Component Value Date/Time    WBC 5.8 08/12/2017 08:35 AM    WBC 6.9 03/09/2010 07:15 AM    RBC 4.27 08/12/2017 08:35 AM    RBC 4.50 03/09/2010 07:15 AM    HEMOGLOBIN 13.5 08/12/2017 08:35 AM    HEMATOCRIT 39.6 08/12/2017 08:35 AM    MCV 92.7 08/12/2017 08:35 AM    MCV 93 03/09/2010 07:15 AM    MCH 31.6 08/12/2017 08:35 AM    MCH 31.3 03/09/2010 07:15 AM    MCHC 34.1 08/12/2017 08:35 AM    MPV 10.9 08/12/2017 08:35 AM    NEUTSPOLYS 63.00 08/12/2017 08:35 AM    LYMPHOCYTES 27.90 08/12/2017 08:35 AM    MONOCYTES 6.70 08/12/2017 08:35 AM    EOSINOPHILS 1.40 08/12/2017 08:35 AM    BASOPHILS 0.50 08/12/2017 08:35 AM      Lab Results   Component Value Date/Time    CALCIUM 9.3 08/12/2017 08:35 AM    ASTSGOT 35 08/12/2017 08:35 AM    ALTSGPT 57 (H) 08/12/2017 08:35 AM    ALKPHOSPHAT 47 08/12/2017 08:35 AM    TBILIRUBIN 0.5 08/12/2017 08:35 AM    ALBUMIN 4.10 10/02/2017 08:55 AM     TOTPROTEIN 7.60 10/02/2017 08:55 AM     Lab Results   Component Value Date/Time    RHEUMFACTN <10 08/12/2017 08:35 AM    CCPANTIBODY 17 08/12/2017 08:35 AM    ANTINUCAB Detected (A) 06/13/2017 11:28 AM     Lab Results   Component Value Date/Time    SEDRATEWES 35 (H) 10/02/2017 08:55 AM    CREACTPROT 0.78 (H) 10/02/2017 08:55 AM     No results found for: DR MILTONVVTINTERP  Lab Results   Component Value Date/Time    A0MEQTMBRJZ 174.0 07/10/2017 09:15 AM    L9HNAMZTZRH 33.0 07/10/2017 09:15 AM     Lab Results   Component Value Date/Time    ANTIDNADS None Detected 07/10/2017 09:15 AM    RNPAB 0 06/13/2017 11:28 AM    SMITHAB 0 07/10/2017 09:15 AM    DKJRSUZ28 0 08/12/2017 08:35 AM    CENTROMBAB 0 08/12/2017 08:35 AM     Lab Results   Component Value Date/Time    ANTIDNADS None Detected 07/10/2017 09:15 AM    Z2RFQVRPMIO 174.0 07/10/2017 09:15 AM    JO1AB 0 08/12/2017 08:35 AM    RNPAB 0 06/13/2017 11:28 AM    ANTISSBSJ 0 06/13/2017 11:28 AM     Lab Results   Component Value Date/Time    COLORURINE Yellow 10/02/2017 08:59 AM    SPECGRAVITY 1.020 10/02/2017 08:59 AM    PHURINE 6.0 10/02/2017 08:59 AM    GLUCOSEUR Negative 10/02/2017 08:59 AM    KETONES Negative 10/02/2017 08:59 AM    PROTEINURIN 30 (A) 10/02/2017 08:59 AM     Lab Results   Component Value Date/Time    TOTPROTUR 22.6 (H) 04/06/2015 07:04 AM     Lab Results   Component Value Date/Time    SSA60 1 07/10/2017 09:15 AM    SSA52 4 07/10/2017 09:15 AM     Lab Results   Component Value Date/Time    HBA1C 6.5 (H) 06/13/2017 11:28 AM     No results found for: CPKTOTAL  No results found for: G6PD  Lab Results   Component Value Date/Time    TUCK14FZYM Negative 08/12/2017 08:36 AM     No results found for: ACESERUM  Lab Results   Component Value Date/Time    25HYDROXY 48 06/13/2017 11:28 AM     Lab Results   Component Value Date/Time    TSH 0.763 03/09/2010 07:15 AM    FREEDIR 1.01 03/09/2010 07:15 AM     Lab Results   Component Value Date/Time    TSHULTRASEN  1.820 06/30/2016 09:30 AM    FREET4 0.79 04/06/2015 07:03 AM     Lab Results   Component Value Date/Time    MICROSOMALA 0.6 08/12/2017 08:35 AM    ANTITHYROGL <0.9 08/12/2017 08:35 AM     No results found for: IGGLYMEABS  Lab Results   Component Value Date/Time    ANTIMITOCHO 25.9 (H) 10/02/2017 08:55 AM    FACTIN 16 10/02/2017 08:55 AM     Lab Results   Component Value Date/Time     10/02/2017 08:55 AM    TTRANSIGA 8 10/01/2012 11:58 AM     No results found for: FLTYPE, CRYSTALSBDF  No results found for: ISTATICAL  No results found for: ISTATCREAT  No results found for: CTELOPEP  No results found for: GBMABG  No results found for: PTHINTACT      Medical Decision Making:  Today's Assessment / Status / Plan:     Positive SUMMER  Additional work-up was unrevealing.  Anti histone was negative.  She did decrease doxycycline with on symptoms.  Will continue to monitor for new symptoms.  Her f-actin is positive will consider autoimmune hepatitis.    Raised ESR  CXR did not show hilar adenopathy  SPEP normal  UA is abnormal  Will repeat UA and if abnormal consider checking anti GBM, ANCA and referral to nephrology    Elevated ALT  Anti smooth muscle antibody is elevated at 25.9  With intermittent ALT elevation  Will refer to GI for autoimmune hepatitis    1. Proteinuria, unspecified type  URINALYSIS    REFERRAL TO GASTROENTEROLOGY   2. ALT (SGPT) level raised  URINALYSIS    REFERRAL TO GASTROENTEROLOGY    HEPATIC FUNCTION PANEL   3. Positive SUMMER (antinuclear antibody)     4. ESR raised       Return in about 10 weeks (around 12/26/2017).      I have spent greater than 50% of this 30 minute visit in counseling/coordination of care with the patient regarding her differential and therapy plan.    She agreed and verbalized her agreement and understanding with the current plan. I answered all questions and concerns she has at this time and advised her to call at any time in there interim with questions or concerns in regards  to her care.    Thank you for allowing me to participate in her care, I will continue to follow closely.

## 2017-11-14 RX ORDER — METFORMIN HYDROCHLORIDE 500 MG/1
TABLET, EXTENDED RELEASE ORAL
Qty: 360 TAB | Refills: 0 | Status: SHIPPED | OUTPATIENT
Start: 2017-11-14 | End: 2018-01-19

## 2018-01-02 ENCOUNTER — HOSPITAL ENCOUNTER (OUTPATIENT)
Dept: LAB | Facility: MEDICAL CENTER | Age: 48
End: 2018-01-02
Attending: INTERNAL MEDICINE
Payer: COMMERCIAL

## 2018-01-02 DIAGNOSIS — R80.9 PROTEINURIA, UNSPECIFIED TYPE: ICD-10-CM

## 2018-01-02 DIAGNOSIS — R74.01 ALT (SGPT) LEVEL RAISED: ICD-10-CM

## 2018-01-02 LAB
ALBUMIN SERPL BCP-MCNC: 4 G/DL (ref 3.2–4.9)
ALP SERPL-CCNC: 42 U/L (ref 30–99)
ALT SERPL-CCNC: 55 U/L (ref 2–50)
AST SERPL-CCNC: 29 U/L (ref 12–45)
BILIRUB CONJ SERPL-MCNC: <0.1 MG/DL (ref 0.1–0.5)
BILIRUB INDIRECT SERPL-MCNC: ABNORMAL MG/DL (ref 0–1)
BILIRUB SERPL-MCNC: 0.4 MG/DL (ref 0.1–1.5)
PROT SERPL-MCNC: 7.1 G/DL (ref 6–8.2)

## 2018-01-02 PROCEDURE — 36415 COLL VENOUS BLD VENIPUNCTURE: CPT

## 2018-01-02 PROCEDURE — 80076 HEPATIC FUNCTION PANEL: CPT

## 2018-01-06 ASSESSMENT — ENCOUNTER SYMPTOMS
FEVER: 0
CHILLS: 0

## 2018-01-06 NOTE — PROGRESS NOTES
Subjective:   Date of Consultation:2018  7:15 AM  Primary care physician:SAMY Roach      Reason for Consultation:    Ms. Jaramillo  is a pleasant 46 y.o. year old female who presented with follow-up for positive SUMMER    Positive SUMMER  She continues to note hair loss, fatigue.  She continues to deny prolonged morning stiffness. However she has brief period of stiffness in the morning.  Still has no rashes.  She has hair loss but  Not getting worse.       Raised ESR  She denies recent infection    Elevated ALT with elevated AMA antibody  At last visit we referred her to GI for evaluation for autoimmune hepatitis.  She has not seen GI.  Will see GI this Friday.  Historically it has been intermittently elevated  She does have an anti mitochondrial antibody that is equivocal.  Previously she was diagnosed with fatty liver  She did have an ultrasound in 2016 and noted fatty liver  Her RUQ pain is improved and has dissipated alot    Abnormal UA  Noted on recent labs  Has not repeated them    Rash/Rosacea  She is now off doxycycline.  Her Rosacea is not acting up.  Her joint pain are the same with no change.      Diabetes  (not addressed)      RHEUM HISTORY:  I first met Ms. Radha Jaramillo with a history of positive SUMMER on 2017.  She was noted to have a persistently elevated ESR.  She otherwise denied prolonged morning stiffness, photosensitivity, sicca symptoms, or Raynauds or eye inflammation.  She did have a mild rash on the anterior chest.    Pertinent Serologies  Results for RADHA JARAMILLO (MRN [id number]) as of 2017 15:20    Ref. Range 2017 11:28 7/10/2017 09:15   C3 Complement Latest Ref Range: 87.0-200.0 mg/dL   174.0   Complement C4 Latest Ref Range: 19.0-52.0 mg/dL   33.0   Complement Total Hemolytic Latest Ref Range:   Units   185 (H)   Stat C-Reactive Protein Latest Ref Range: 0.00-0.75 mg/dL 0.89 (H)     Anti-Dna -Ds Latest Ref Range: None Detected  None Detected  None Detected   Antinuclear Antibody Latest Ref Range: None Detected  Detected (A)     Rnp Antibodies Latest Ref Range: 0-40 AU/mL 0     Smith Antibodies Latest Ref Range: 0-40 AU/mL 0 0   SSA 52 (R0)(ERVIN) Ab, IgG Latest Ref Range: 0-40 AU/mL 8 4   SSA 60 (R0)(ERVIN) Ab, IgG Latest Ref Range: 0-40 AU/mL 4 1   Sjogren'S Anti-Ss-B Latest Ref Range: 0-40 AU/mL 0     SUMMER Titer Latest Ref Range: <1:40  1:5120 (H)        Results for AUBREY JARAMILLO (MRN [id number]) as of 8/2/2017 15:25    Ref. Range 10/1/2012 11:58 4/6/2015 07:03   Microsomal -Tpo- Abs Latest Ref Range: 0.0-9.0 IU/mL 0.4 0.4   Anti-Thyroglobulin Latest Ref Range: 0.0-4.0 IU/mL <0.9 <0.9   Gliadin Antibodies Iga Latest Ref Range: 0-19 Units 9        Results for AUBREY JARAMILLO (MRN [id number]) as of 8/28/2017 19:07   Ref. Range 8/12/2017 08:36   HLA-B27 Screen Latest Ref Range: Negative  Negative     Results for AUBREY JARAMILLO (MRN [id number]) as of 8/28/2017 19:07   Ref. Range 8/12/2017 08:35   Rheumatoid Factor -Neph- Latest Ref Range: 0 - 14 IU/mL <10       Results for AUBREY JARAMILLO (MRN [id number]) as of 8/28/2017 19:07   Ref. Range 8/12/2017 08:35   Anti-Mitochondrial Ab Latest Ref Range: 0.0 - 20.0 Units 23.8 (H)   Microsomal -Tpo- Abs Latest Ref Range: 0.0 - 9.0 IU/mL 0.6   Anti-Thyroglobulin Latest Ref Range: 0.0 - 4.0 IU/mL <0.9   F-Actin Ab, IgG Latest Ref Range: 0 - 19 Units 18   Anti-Scl-70 Latest Ref Range: 0 - 40 AU/mL 0   Susanne-1 Antibody, IgG Latest Ref Range: 0 - 40 AU/mL 0   Anti-Centromere B Ab Latest Ref Range: 0 - 40 AU/mL 0   Ccp Antibodies Latest Ref Range: 0 - 19 Units 17   Chromatin Ab, IgG Latest Ref Range: 0 - 19 Units 3     Results for AUBREY JARAMILLO (MRN [id number]) as of 10/17/2017 15:55   Ref. Range 10/2/2017 08:55   Anti-Mitochondrial Ab Latest Ref Range: 0.0 - 20.0 Units 25.9 (H)   F-Actin Ab, IgG Latest Ref Range: 0 - 19 Units 16   Anti-Histone Antibodies Latest Ref Range: 0.0 - 0.9 Units 0.5           Past  Medical HIstory: DM, infertility (at age 16 she experienced amenorrhea and diagnosed with PCOS managed with birth control), no miscarriages, gestational diabetes with 3 kids, premature births (@ 33 weeks, @ 37 weeks, @ 38 weeks).        Social History: not a current smoker, no alcohol, school as a  graduated 12/2017 [employer]     Family History: son has very dry eyes ad has chronic hives, heart stents, DM,    Past Medical History:   Diagnosis Date   • Allergy    • Anemia    • Arthritis    • Hyperlipidemia    • Hypertriglyceridemia 3/17/2010     No past surgical history on file.  Allergies   Allergen Reactions   • Bactrim [Sulfamethoxazole W-Trimethoprim] Unspecified     Rash    • No Known Drug Allergy      Outpatient Encounter Prescriptions as of 1/9/2018   Medication Sig Dispense Refill   • B Complex Vitamins (VITAMIN B COMPLEX PO) Take  by mouth.     • metformin ER (GLUCOPHAGE XR) 500 MG TABLET SR 24 HR TAKE 2 TABLETS BY MOUTH 2 TIMES A DAY. 360 Tab 0   • metronidazole (METROCREAM) 0.75 % cream Apply  to affected area(s) 2 times a day.     • ONE TOUCH ULTRA TEST strip USE 4 TIMES A DAY AND AS NEEDED FOR HIGH OR LOW BLOOD SUGAR  3   • Blood Glucose Monitoring Suppl Device Meter: Dispense insurance specific meter. Sig. Use as directed for blood sugar monitoring. #1. NR. DX: E11.9 1 Device 0   • Lancets Misc Lancets order: Lancets for insurance specific meter. Sig: use qid and prn ssx high or low sugar. #100 RF x 3 DX: E11.9 100 Each 3   • Blood Glucose Monitoring Suppl SUPPLIES Misc Test strips order: Test strips for insurance specific meter. Sig: use qid and prn ssx high or low sugar #100 RF x 3. DX: E11.9 100 Each 3   • Multiple Vitamin (DAILY VITAMINS PO) Take  by mouth.     • atorvastatin (LIPITOR) 20 MG Tab TAKE 1 TAB BY MOUTH EVERY DAY. 90 Tab 1   • cephALEXin (KEFLEX) 500 MG Cap Take 1 Cap by mouth 2 times a day. 28 Cap 0   • ibuprofen (MOTRIN) 200 MG Tab Take 200 mg by mouth every 6  hours as needed.     • [DISCONTINUED] DOXYCYCLINE PO Take  by mouth.     • fluticasone (FLONASE) 50 MCG/ACT nasal spray Spray 1 Spray in nose 2 times a day. Each Nostril 1 Bottle 3   • BLISOVI FE  1-20 MG-MCG per tablet 1 Tab every day.  3   • Cholecalciferol (VITAMIN D) 2000 UNITS Cap Take  by mouth.       No facility-administered encounter medications on file as of 2018.        Social History     Social History   • Marital status:      Spouse name: N/A   • Number of children: 4   • Years of education: N/A     Occupational History   • student early childhood education           Social History Main Topics   • Smoking status: Former Smoker     Packs/day: 0.25     Years: 1.00     Types: Cigarettes     Quit date: 1991   • Smokeless tobacco: Never Used   • Alcohol use No      Comment: Congregational prohibits   • Drug use: No   • Sexual activity: Yes     Partners: Male     Birth control/ protection: Surgical     Other Topics Concern   • Not on file     Social History Narrative   • No narrative on file      History   Smoking Status   • Former Smoker   • Packs/day: 0.25   • Years: 1.00   • Types: Cigarettes   • Quit date: 1991   Smokeless Tobacco   • Never Used     History   Alcohol Use No     Comment: Congregational prohibits     History   Drug Use No      OB History    Para Term  AB Living   4 4 1 3       SAB TAB Ectopic Molar Multiple Live Births                    # Outcome Date GA Lbr Nixon/2nd Weight Sex Delivery Anes PTL Lv   4             3             2             1 Term                  Patient's last menstrual period was 2018.    G P A L     Family History   Problem Relation Age of Onset   • Diabetes Mother    • Heart Disease Father    • Hypertension Father    • Hyperlipidemia Father    • Diabetes Brother    • Heart Disease Brother    • Hypertension Brother    • Hyperlipidemia Brother    • Diabetes Maternal Aunt    • Diabetes Maternal Uncle     • Cancer Maternal Grandmother      Cervical/ovarian   • Diabetes Maternal Grandfather    • Heart Disease Maternal Grandfather    • Hypertension Maternal Grandfather    • Hyperlipidemia Maternal Grandfather    • Stroke Maternal Grandfather    • Stroke Paternal Grandmother    • Hyperlipidemia Paternal Grandmother    • Hypertension Paternal Grandmother    • Heart Disease Paternal Grandmother    • Heart Disease Paternal Grandfather    • Hypertension Paternal Grandfather    • Hyperlipidemia Paternal Grandfather        Review of Systems   Constitutional: Negative for chills and fever.   Cardiovascular: Negative for chest pain.   Musculoskeletal: Positive for joint pain.   Skin: Negative for rash.        Objective:   /80   Pulse 84   Temp 36.8 °C (98.3 °F)   Resp 16   Wt 107.5 kg (237 lb)   LMP 01/03/2018   SpO2 95%   Breastfeeding? No   BMI 40.66 kg/m²     Physical Exam   Constitutional: She is oriented to person, place, and time. She appears well-developed and well-nourished. No distress.   HENT:   Head: Normocephalic and atraumatic.   Right Ear: External ear normal.   Left Ear: External ear normal.   Eyes: Conjunctivae are normal. Right eye exhibits no discharge. Left eye exhibits no discharge. No scleral icterus.   Pulmonary/Chest: No stridor. No respiratory distress. She has no wheezes.   Abdominal: Soft. She exhibits no distension. There is no tenderness.   No hepatomegaly   Musculoskeletal: She exhibits no edema.   No synovitis appreciated in the upper extremities   Neurological: She is alert and oriented to person, place, and time.   Skin: Skin is warm and dry. She is not diaphoretic. No erythema.   Limited skin exam.  No overt malar rash   Psychiatric: She has a normal mood and affect. Her behavior is normal. Judgment and thought content normal.       Assessment:     1. Proteinuria, unspecified type  URINALYSIS   2. SUMMER positive  WESTERGREN SED RATE    CRP QUANTITIVE (NON-CARDIAC)    CBC WITH  DIFFERENTIAL    COMP METABOLIC PANEL    HEP C VIRUS ANTIBODY   3. Positive SUMMER (antinuclear antibody)     4. ALT (SGPT) level raised       Labs:  [unfilled]    No results found for: QNTTBGOLD  No results found for: HEPBCORTOT, HEPBCORIGM, HEPBSAG  No results found for: HEPCAB  Lab Results   Component Value Date/Time    SODIUM 134 (L) 08/12/2017 08:35 AM    POTASSIUM 4.0 08/12/2017 08:35 AM    CHLORIDE 106 08/12/2017 08:35 AM    CO2 21 08/12/2017 08:35 AM    GLUCOSE 140 (H) 08/12/2017 08:35 AM    BUN 18 08/12/2017 08:35 AM    CREATININE 0.63 08/12/2017 08:35 AM    CREATININE 0.82 04/11/2016    BUNCREATRAT 23 03/09/2010 07:15 AM    GLOMRATE >59 03/09/2010 07:15 AM      Lab Results   Component Value Date/Time    WBC 5.8 08/12/2017 08:35 AM    WBC 6.9 03/09/2010 07:15 AM    RBC 4.27 08/12/2017 08:35 AM    RBC 4.50 03/09/2010 07:15 AM    HEMOGLOBIN 13.5 08/12/2017 08:35 AM    HEMATOCRIT 39.6 08/12/2017 08:35 AM    MCV 92.7 08/12/2017 08:35 AM    MCV 93 03/09/2010 07:15 AM    MCH 31.6 08/12/2017 08:35 AM    MCH 31.3 03/09/2010 07:15 AM    MCHC 34.1 08/12/2017 08:35 AM    MPV 10.9 08/12/2017 08:35 AM    NEUTSPOLYS 63.00 08/12/2017 08:35 AM    LYMPHOCYTES 27.90 08/12/2017 08:35 AM    MONOCYTES 6.70 08/12/2017 08:35 AM    EOSINOPHILS 1.40 08/12/2017 08:35 AM    BASOPHILS 0.50 08/12/2017 08:35 AM      Lab Results   Component Value Date/Time    CALCIUM 9.3 08/12/2017 08:35 AM    ASTSGOT 29 01/02/2018 09:30 AM    ALTSGPT 55 (H) 01/02/2018 09:30 AM    ALKPHOSPHAT 42 01/02/2018 09:30 AM    TBILIRUBIN 0.4 01/02/2018 09:30 AM    ALBUMIN 4.0 01/02/2018 09:30 AM    ALBUMIN 4.10 10/02/2017 08:55 AM    TOTPROTEIN 7.1 01/02/2018 09:30 AM    TOTPROTEIN 7.60 10/02/2017 08:55 AM     Lab Results   Component Value Date/Time    RHEUMFACTN <10 08/12/2017 08:35 AM    CCPANTIBODY 17 08/12/2017 08:35 AM    ANTINUCAB Detected (A) 06/13/2017 11:28 AM     Lab Results   Component Value Date/Time    SEDRATEWES 35 (H) 10/02/2017 08:55 AM    CREACTPROT  0.78 (H) 10/02/2017 08:55 AM     No results found for: RUSSELVIPER, DRVVTINTERP  Lab Results   Component Value Date/Time    U9ZPBSCLXOO 174.0 07/10/2017 09:15 AM    N1GDSDYHKXX 33.0 07/10/2017 09:15 AM     Lab Results   Component Value Date/Time    ANTIDNADS None Detected 07/10/2017 09:15 AM    RNPAB 0 06/13/2017 11:28 AM    SMITHAB 0 07/10/2017 09:15 AM    HSBYAXF60 0 08/12/2017 08:35 AM    CENTROMBAB 0 08/12/2017 08:35 AM     Lab Results   Component Value Date/Time    ANTIDNADS None Detected 07/10/2017 09:15 AM    H9XOQNKDBKI 174.0 07/10/2017 09:15 AM    JO1AB 0 08/12/2017 08:35 AM    RNPAB 0 06/13/2017 11:28 AM    ANTISSBSJ 0 06/13/2017 11:28 AM     Lab Results   Component Value Date/Time    COLORURINE Yellow 10/02/2017 08:59 AM    SPECGRAVITY 1.020 10/02/2017 08:59 AM    PHURINE 6.0 10/02/2017 08:59 AM    GLUCOSEUR Negative 10/02/2017 08:59 AM    KETONES Negative 10/02/2017 08:59 AM    PROTEINURIN 30 (A) 10/02/2017 08:59 AM     Lab Results   Component Value Date/Time    TOTPROTUR 22.6 (H) 04/06/2015 07:04 AM     Lab Results   Component Value Date/Time    SSA60 1 07/10/2017 09:15 AM    SSA52 4 07/10/2017 09:15 AM     Lab Results   Component Value Date/Time    HBA1C 6.5 (H) 06/13/2017 11:28 AM     No results found for: CPKTOTAL  No results found for: G6PD  Lab Results   Component Value Date/Time    UTSG89YIFQ Negative 08/12/2017 08:36 AM     No results found for: ACESERUM  Lab Results   Component Value Date/Time    25HYDROXY 48 06/13/2017 11:28 AM     Lab Results   Component Value Date/Time    TSH 0.763 03/09/2010 07:15 AM    FREEDIR 1.01 03/09/2010 07:15 AM     Lab Results   Component Value Date/Time    TSHULTRASEN 1.820 06/30/2016 09:30 AM    FREET4 0.79 04/06/2015 07:03 AM     Lab Results   Component Value Date/Time    MICROSOMALA 0.6 08/12/2017 08:35 AM    ANTITHYROGL <0.9 08/12/2017 08:35 AM     No results found for: IGGLYMEABS  Lab Results   Component Value Date/Time    ANTIMITOCHO 25.9 (H) 10/02/2017 08:55  AM    FACTIN 16 10/02/2017 08:55 AM     Lab Results   Component Value Date/Time     10/02/2017 08:55 AM    TTRANSIGA 8 10/01/2012 11:58 AM     No results found for: FLTYPE, CRYSTALSBDF  No results found for: ISTATICAL  No results found for: ISTATCREAT  No results found for: CTELOPEP  No results found for: GBMABG  No results found for: PTHINTACT      Medical Decision Making:  Today's Assessment / Status / Plan:     Positive SUMMER  Additional work-up was unrevealing.  Anti histone was negative.  She is off doxycyline.  She still has arthralgias but more after rest with movement.  Will continue to monitor for new symptoms.  Her f-actin is positive will consider autoimmune hepatitis.    Raised ESR  CXR did not show hilar adenopathy  SPEP normal  UA is abnormal.  Did not repeat UA yet.  Will repeat UA and if abnormal consider checking anti GBM, ANCA and referral to nephrology  Will also recheck ESR and CRP    Elevated ALT  Anti smooth muscle antibody is elevated at 25.9  With intermittent ALT elevation  Will f/u with GI recommendations  Will check hep C    1. Proteinuria, unspecified type  URINALYSIS   2. SUMMER positive  WESTERGREN SED RATE    CRP QUANTITIVE (NON-CARDIAC)    CBC WITH DIFFERENTIAL    COMP METABOLIC PANEL    HEP C VIRUS ANTIBODY   3. Positive SUMMER (antinuclear antibody)     4. ALT (SGPT) level raised       Return in about 3 months (around 4/9/2018).      I have spent greater than 50% of this 30 minute visit in counseling/coordination of care with the patient regarding her differential and therapy plan.    She agreed and verbalized her agreement and understanding with the current plan. I answered all questions and concerns she has at this time and advised her to call at any time in there interim with questions or concerns in regards to her care.    Thank you for allowing me to participate in her care, I will continue to follow closely.

## 2018-01-09 ENCOUNTER — OFFICE VISIT (OUTPATIENT)
Dept: RHEUMATOLOGY | Facility: PHYSICIAN GROUP | Age: 48
End: 2018-01-09
Payer: COMMERCIAL

## 2018-01-09 VITALS
RESPIRATION RATE: 16 BRPM | DIASTOLIC BLOOD PRESSURE: 80 MMHG | HEART RATE: 84 BPM | SYSTOLIC BLOOD PRESSURE: 108 MMHG | BODY MASS INDEX: 40.66 KG/M2 | OXYGEN SATURATION: 95 % | TEMPERATURE: 98.3 F | WEIGHT: 237 LBS

## 2018-01-09 DIAGNOSIS — R74.01 ALT (SGPT) LEVEL RAISED: ICD-10-CM

## 2018-01-09 DIAGNOSIS — R76.8 ANA POSITIVE: ICD-10-CM

## 2018-01-09 DIAGNOSIS — R76.8 POSITIVE ANA (ANTINUCLEAR ANTIBODY): ICD-10-CM

## 2018-01-09 DIAGNOSIS — R80.9 PROTEINURIA, UNSPECIFIED TYPE: ICD-10-CM

## 2018-01-09 PROCEDURE — 99214 OFFICE O/P EST MOD 30 MIN: CPT | Performed by: INTERNAL MEDICINE

## 2018-01-09 NOTE — LETTER
[facility]   [address], [address]  [place] [address]  Phone: [phone]  Fax: [phone]              Encounter Date: 1/9/2018    Dear Dr. Campbell ref. provider found,    It was a pleasure seeing your patient, Radha Marlow, on 1/9/2018. Diagnoses of Proteinuria, unspecified type, SUMMER positive, Positive SUMMER (antinuclear antibody), and ALT (SGPT) level raised were pertinent to this visit.     Please find attached progress note which includes the history I obtained from Ms. Marlow, my physical examination findings, my impression and recommendations.      Once again, it was a pleasure participating in your patient's care.  Please feel free to contact me if you have any questions or if I can be of any further assistance to your patients.      Sincerely,    Lulu Wilburn M.D.  Electronically Signed          PROGRESS NOTE:  Subjective:   Date of Consultation:1/9/2018  7:15 AM  Primary care physician:SAMY Roach      Reason for Consultation:    Ms. Marlow  is a pleasant 46 y.o. year old female who presented with follow-up for positive SUMMER    Positive SUMMER  She continues to note hair loss, fatigue.  She continues to deny prolonged morning stiffness. However she has brief period of stiffness in the morning.  Still has no rashes.  She has hair loss but  Not getting worse.       Raised ESR  She denies recent infection    Elevated ALT with elevated AMA antibody  At last visit we referred her to GI for evaluation for autoimmune hepatitis.  She has not seen GI.  Will see GI this Friday.  Historically it has been intermittently elevated  She does have an anti mitochondrial antibody that is equivocal.  Previously she was diagnosed with fatty liver  She did have an ultrasound in 8/2016 and noted fatty liver  Her RUQ pain is improved and has dissipated alot    Abnormal UA  Noted on recent labs  Has not repeated them    Rash/Rosacea  She is now off doxycycline.  Her Rosacea is not acting  up.  Her joint pain are the same with no change.      Diabetes  (not addressed)      RHEUM HISTORY:  I first met Ms. Radha Jaramillo with a history of positive SUMMER on 2017.  She was noted to have a persistently elevated ESR.  She otherwise denied prolonged morning stiffness, photosensitivity, sicca symptoms, or Raynauds or eye inflammation.  She did have a mild rash on the anterior chest.    Pertinent Serologies  Results for RADHA JARAMILLO (MRN [id number]) as of 2017 15:20    Ref. Range 2017 11:28 7/10/2017 09:15   C3 Complement Latest Ref Range: 87.0-200.0 mg/dL   174.0   Complement C4 Latest Ref Range: 19.0-52.0 mg/dL   33.0   Complement Total Hemolytic Latest Ref Range:   Units   185 (H)   Stat C-Reactive Protein Latest Ref Range: 0.00-0.75 mg/dL 0.89 (H)     Anti-Dna -Ds Latest Ref Range: None Detected  None Detected None Detected   Antinuclear Antibody Latest Ref Range: None Detected  Detected (A)     Rnp Antibodies Latest Ref Range: 0-40 AU/mL 0     Smith Antibodies Latest Ref Range: 0-40 AU/mL 0 0   SSA 52 (R0)(ERVIN) Ab, IgG Latest Ref Range: 0-40 AU/mL 8 4   SSA 60 (R0)(ERVIN) Ab, IgG Latest Ref Range: 0-40 AU/mL 4 1   Sjogren'S Anti-Ss-B Latest Ref Range: 0-40 AU/mL 0     SUMMER Titer Latest Ref Range: <1:40  1:5120 (H)        Results for RADHA JARAMILLO (MRN [id number]) as of 2017 15:25    Ref. Range 10/1/2012 11:58 2015 07:03   Microsomal -Tpo- Abs Latest Ref Range: 0.0-9.0 IU/mL 0.4 0.4   Anti-Thyroglobulin Latest Ref Range: 0.0-4.0 IU/mL <0.9 <0.9   Gliadin Antibodies Iga Latest Ref Range: 0-19 Units 9        Results for RADHA JARAMILLO (MRN [id number]) as of 2017 19:07   Ref. Range 2017 08:36   HLA-B27 Screen Latest Ref Range: Negative  Negative     Results for RADHA JARAMILLO (MRN [id number]) as of 2017 19:07   Ref. Range 2017 08:35   Rheumatoid Factor -Neph- Latest Ref Range: 0 - 14 IU/mL <10       Results for RADHA JARAMILLO (MRN [id number])  as of 8/28/2017 19:07   Ref. Range 8/12/2017 08:35   Anti-Mitochondrial Ab Latest Ref Range: 0.0 - 20.0 Units 23.8 (H)   Microsomal -Tpo- Abs Latest Ref Range: 0.0 - 9.0 IU/mL 0.6   Anti-Thyroglobulin Latest Ref Range: 0.0 - 4.0 IU/mL <0.9   F-Actin Ab, IgG Latest Ref Range: 0 - 19 Units 18   Anti-Scl-70 Latest Ref Range: 0 - 40 AU/mL 0   Susanne-1 Antibody, IgG Latest Ref Range: 0 - 40 AU/mL 0   Anti-Centromere B Ab Latest Ref Range: 0 - 40 AU/mL 0   Ccp Antibodies Latest Ref Range: 0 - 19 Units 17   Chromatin Ab, IgG Latest Ref Range: 0 - 19 Units 3     Results for AUBREY JARAMILLO (MRN [id number]) as of 10/17/2017 15:55   Ref. Range 10/2/2017 08:55   Anti-Mitochondrial Ab Latest Ref Range: 0.0 - 20.0 Units 25.9 (H)   F-Actin Ab, IgG Latest Ref Range: 0 - 19 Units 16   Anti-Histone Antibodies Latest Ref Range: 0.0 - 0.9 Units 0.5           Past Medical HIstory: DM, infertility (at age 16 she experienced amenorrhea and diagnosed with PCOS managed with birth control), no miscarriages, gestational diabetes with 3 kids, premature births (@ 33 weeks, @ 37 weeks, @ 38 weeks).        Social History: not a current smoker, no alcohol, school as a  graduated 12/2017 Bachelor, [employer]     Family History: son has very dry eyes ad has chronic hives, heart stents, DM,    Past Medical History:   Diagnosis Date   • Allergy    • Anemia    • Arthritis    • Hyperlipidemia    • Hypertriglyceridemia 3/17/2010     No past surgical history on file.  Allergies   Allergen Reactions   • Bactrim [Sulfamethoxazole W-Trimethoprim] Unspecified     Rash    • No Known Drug Allergy      Outpatient Encounter Prescriptions as of 1/9/2018   Medication Sig Dispense Refill   • B Complex Vitamins (VITAMIN B COMPLEX PO) Take  by mouth.     • metformin ER (GLUCOPHAGE XR) 500 MG TABLET SR 24 HR TAKE 2 TABLETS BY MOUTH 2 TIMES A DAY. 360 Tab 0   • metronidazole (METROCREAM) 0.75 % cream Apply  to affected area(s) 2 times a day.     • ONE TOUCH  ULTRA TEST strip USE 4 TIMES A DAY AND AS NEEDED FOR HIGH OR LOW BLOOD SUGAR  3   • Blood Glucose Monitoring Suppl Device Meter: Dispense insurance specific meter. Sig. Use as directed for blood sugar monitoring. #1. NR. DX: E11.9 1 Device 0   • Lancets Misc Lancets order: Lancets for insurance specific meter. Sig: use qid and prn ssx high or low sugar. #100 RF x 3 DX: E11.9 100 Each 3   • Blood Glucose Monitoring Suppl SUPPLIES Misc Test strips order: Test strips for insurance specific meter. Sig: use qid and prn ssx high or low sugar #100 RF x 3. DX: E11.9 100 Each 3   • Multiple Vitamin (DAILY VITAMINS PO) Take  by mouth.     • atorvastatin (LIPITOR) 20 MG Tab TAKE 1 TAB BY MOUTH EVERY DAY. 90 Tab 1   • cephALEXin (KEFLEX) 500 MG Cap Take 1 Cap by mouth 2 times a day. 28 Cap 0   • ibuprofen (MOTRIN) 200 MG Tab Take 200 mg by mouth every 6 hours as needed.     • [DISCONTINUED] DOXYCYCLINE PO Take  by mouth.     • fluticasone (FLONASE) 50 MCG/ACT nasal spray Spray 1 Spray in nose 2 times a day. Each Nostril 1 Bottle 3   • BLISOVI FE 1/20 1-20 MG-MCG per tablet 1 Tab every day.  3   • Cholecalciferol (VITAMIN D) 2000 UNITS Cap Take  by mouth.       No facility-administered encounter medications on file as of 1/9/2018.        Social History     Social History   • Marital status:      Spouse name: N/A   • Number of children: 4   • Years of education: N/A     Occupational History   • student early childhood education           Social History Main Topics   • Smoking status: Former Smoker     Packs/day: 0.25     Years: 1.00     Types: Cigarettes     Quit date: 1/1/1991   • Smokeless tobacco: Never Used   • Alcohol use No      Comment: Hoahaoism prohibits   • Drug use: No   • Sexual activity: Yes     Partners: Male     Birth control/ protection: Surgical     Other Topics Concern   • Not on file     Social History Narrative   • No narrative on file      History   Smoking Status   • Former Smoker   •  Packs/day: 0.25   • Years: 1.00   • Types: Cigarettes   • Quit date: 1991   Smokeless Tobacco   • Never Used     History   Alcohol Use No     Comment: Spiritism prohibits     History   Drug Use No      OB History    Para Term  AB Living   4 4 1 3       SAB TAB Ectopic Molar Multiple Live Births                    # Outcome Date GA Lbr Nixon/2nd Weight Sex Delivery Anes PTL Lv   4             3             2             1 Term                  Patient's last menstrual period was 2018.    G P A L     Family History   Problem Relation Age of Onset   • Diabetes Mother    • Heart Disease Father    • Hypertension Father    • Hyperlipidemia Father    • Diabetes Brother    • Heart Disease Brother    • Hypertension Brother    • Hyperlipidemia Brother    • Diabetes Maternal Aunt    • Diabetes Maternal Uncle    • Cancer Maternal Grandmother      Cervical/ovarian   • Diabetes Maternal Grandfather    • Heart Disease Maternal Grandfather    • Hypertension Maternal Grandfather    • Hyperlipidemia Maternal Grandfather    • Stroke Maternal Grandfather    • Stroke Paternal Grandmother    • Hyperlipidemia Paternal Grandmother    • Hypertension Paternal Grandmother    • Heart Disease Paternal Grandmother    • Heart Disease Paternal Grandfather    • Hypertension Paternal Grandfather    • Hyperlipidemia Paternal Grandfather        Review of Systems   Constitutional: Negative for chills and fever.   Cardiovascular: Negative for chest pain.   Musculoskeletal: Positive for joint pain.   Skin: Negative for rash.        Objective:   /80   Pulse 84   Temp 36.8 °C (98.3 °F)   Resp 16   Wt 107.5 kg (237 lb)   LMP 2018   SpO2 95%   Breastfeeding? No   BMI 40.66 kg/m²      Physical Exam   Constitutional: She is oriented to person, place, and time. She appears well-developed and well-nourished. No distress.   HENT:   Head: Normocephalic and atraumatic.   Right Ear: External ear normal.      Left Ear: External ear normal.   Eyes: Conjunctivae are normal. Right eye exhibits no discharge. Left eye exhibits no discharge. No scleral icterus.   Pulmonary/Chest: No stridor. No respiratory distress. She has no wheezes.   Abdominal: Soft. She exhibits no distension. There is no tenderness.   No hepatomegaly   Musculoskeletal: She exhibits no edema.   No synovitis appreciated in the upper extremities   Neurological: She is alert and oriented to person, place, and time.   Skin: Skin is warm and dry. She is not diaphoretic. No erythema.   Limited skin exam.  No overt malar rash   Psychiatric: She has a normal mood and affect. Her behavior is normal. Judgment and thought content normal.       Assessment:     1. Proteinuria, unspecified type  URINALYSIS   2. SUMMER positive  WESTERGREN SED RATE    CRP QUANTITIVE (NON-CARDIAC)    CBC WITH DIFFERENTIAL    COMP METABOLIC PANEL    HEP C VIRUS ANTIBODY   3. Positive SUMMER (antinuclear antibody)     4. ALT (SGPT) level raised       Labs:  [unfilled]    No results found for: QNTTBGOLD  No results found for: HEPBCORTOT, HEPBCORIGM, HEPBSAG  No results found for: HEPCAB  Lab Results   Component Value Date/Time    SODIUM 134 (L) 08/12/2017 08:35 AM    POTASSIUM 4.0 08/12/2017 08:35 AM    CHLORIDE 106 08/12/2017 08:35 AM    CO2 21 08/12/2017 08:35 AM    GLUCOSE 140 (H) 08/12/2017 08:35 AM    BUN 18 08/12/2017 08:35 AM    CREATININE 0.63 08/12/2017 08:35 AM    CREATININE 0.82 04/11/2016    BUNCREATRAT 23 03/09/2010 07:15 AM    GLOMRATE >59 03/09/2010 07:15 AM      Lab Results   Component Value Date/Time    WBC 5.8 08/12/2017 08:35 AM    WBC 6.9 03/09/2010 07:15 AM    RBC 4.27 08/12/2017 08:35 AM    RBC 4.50 03/09/2010 07:15 AM    HEMOGLOBIN 13.5 08/12/2017 08:35 AM    HEMATOCRIT 39.6 08/12/2017 08:35 AM    MCV 92.7 08/12/2017 08:35 AM    MCV 93 03/09/2010 07:15 AM    MCH 31.6 08/12/2017 08:35 AM    MCH 31.3 03/09/2010 07:15 AM    [facility]1 08/12/2017 08:35 AM    MPV 10.9 08/12/2017  08:35 AM    NEUTSPOLYS 63.00 08/12/2017 08:35 AM    LYMPHOCYTES 27.90 08/12/2017 08:35 AM    MONOCYTES 6.70 08/12/2017 08:35 AM    EOSINOPHILS 1.40 08/12/2017 08:35 AM    BASOPHILS 0.50 08/12/2017 08:35 AM      Lab Results   Component Value Date/Time    CALCIUM 9.3 08/12/2017 08:35 AM    ASTSGOT 29 01/02/2018 09:30 AM    ALTSGPT 55 (H) 01/02/2018 09:30 AM    ALKPHOSPHAT 42 01/02/2018 09:30 AM    TBILIRUBIN 0.4 01/02/2018 09:30 AM    ALBUMIN 4.0 01/02/2018 09:30 AM    ALBUMIN 4.10 10/02/2017 08:55 AM    TOTPROTEIN 7.1 01/02/2018 09:30 AM    TOTPROTEIN 7.60 10/02/2017 08:55 AM     Lab Results   Component Value Date/Time    RHEUMFACTN <10 08/12/2017 08:35 AM    CCPANTIBODY 17 08/12/2017 08:35 AM    ANTINUCAB Detected (A) 06/13/2017 11:28 AM     Lab Results   Component Value Date/Time    SEDRATEWES 35 (H) 10/02/2017 08:55 AM    CREACTPROT 0.78 (H) 10/02/2017 08:55 AM     No results found for: ALEXANDRA ROSE  Lab Results   Component Value Date/Time    A3GABQAMWKJ 174.0 07/10/2017 09:15 AM    H0BHDSNOEIA 33.0 07/10/2017 09:15 AM     Lab Results   Component Value Date/Time    ANTIDNADS None Detected 07/10/2017 09:15 AM    RNPAB 0 06/13/2017 11:28 AM    SMITHAB 0 07/10/2017 09:15 AM    KJIQRAK49 0 08/12/2017 08:35 AM    CENTROMBAB 0 08/12/2017 08:35 AM     Lab Results   Component Value Date/Time    ANTIDNADS None Detected 07/10/2017 09:15 AM    M4IXBQJPNIQ 174.0 07/10/2017 09:15 AM    JO1AB 0 08/12/2017 08:35 AM    RNPAB 0 06/13/2017 11:28 AM    ANTISSBSJ 0 06/13/2017 11:28 AM     Lab Results   Component Value Date/Time    COLORURINE Yellow 10/02/2017 08:59 AM    SPECGRAVITY 1.020 10/02/2017 08:59 AM    PHURINE 6.0 10/02/2017 08:59 AM    GLUCOSEUR Negative 10/02/2017 08:59 AM    KETONES Negative 10/02/2017 08:59 AM    PROTEINURIN 30 (A) 10/02/2017 08:59 AM     Lab Results   Component Value Date/Time    TOTPROTUR 22.6 (H) 04/06/2015 07:04 AM     Lab Results   Component Value Date/Time    SSA60 1 07/10/2017 09:15 AM     SSA52 4 07/10/2017 09:15 AM     Lab Results   Component Value Date/Time    HBA1C 6.5 (H) 06/13/2017 11:28 AM     No results found for: CPKTOTAL  No results found for: G6PD  Lab Results   Component Value Date/Time    UTHD40AXVZ Negative 08/12/2017 08:36 AM     No results found for: ACESERUM  Lab Results   Component Value Date/Time    25HYDROXY 48 06/13/2017 11:28 AM     Lab Results   Component Value Date/Time    TSH 0.763 03/09/2010 07:15 AM    FREEDIR 1.01 03/09/2010 07:15 AM     Lab Results   Component Value Date/Time    TSHULTRASEN 1.820 06/30/2016 09:30 AM    FREET4 0.79 04/06/2015 07:03 AM     Lab Results   Component Value Date/Time    MICROSOMALA 0.6 08/12/2017 08:35 AM    ANTITHYROGL <0.9 08/12/2017 08:35 AM     No results found for: IGGLYMEABS  Lab Results   Component Value Date/Time    ANTIMITOCHO 25.9 (H) 10/02/2017 08:55 AM    FACTIN 16 10/02/2017 08:55 AM     Lab Results   Component Value Date/Time     10/02/2017 08:55 AM    TTRANSIGA 8 10/01/2012 11:58 AM     No results found for: FLTYPE, CRYSTALSBDF  No results found for: ISTATICAL  No results found for: ISTATCREAT  No results found for: CTELOPEP  No results found for: GBMABG  No results found for: PTHINTACT      Medical Decision Making:  Today's Assessment / Status / Plan:     Positive SUMMER  Additional work-up was unrevealing.  Anti histone was negative.  She is off doxycyline.  She still has arthralgias but more after rest with movement.  Will continue to monitor for new symptoms.  Her f-actin is positive will consider autoimmune hepatitis.    Raised ESR  CXR did not show hilar adenopathy  SPEP normal  UA is abnormal.  Did not repeat UA yet.  Will repeat UA and if abnormal consider checking anti GBM, ANCA and referral to nephrology  Will also recheck ESR and CRP    Elevated ALT  Anti smooth muscle antibody is elevated at 25.9  With intermittent ALT elevation  Will f/u with GI recommendations  Will check hep C    1. Proteinuria, unspecified  type  URINALYSIS   2. SUMMER positive  WESTERGREN SED RATE    CRP QUANTITIVE (NON-CARDIAC)    CBC WITH DIFFERENTIAL    COMP METABOLIC PANEL    HEP C VIRUS ANTIBODY   3. Positive SUMMER (antinuclear antibody)     4. ALT (SGPT) level raised       Return in about 3 months (around 4/9/2018).      I have spent greater than 50% of this 30 minute visit in counseling/coordination of care with the patient regarding her differential and therapy plan.    She agreed and verbalized her agreement and understanding with the current plan. I answered all questions and concerns she has at this time and advised her to call at any time in there interim with questions or concerns in regards to her care.    Thank you for allowing me to participate in her care, I will continue to follow closely.

## 2018-01-11 ENCOUNTER — HOSPITAL ENCOUNTER (OUTPATIENT)
Dept: LAB | Facility: MEDICAL CENTER | Age: 48
End: 2018-01-11
Attending: INTERNAL MEDICINE
Payer: COMMERCIAL

## 2018-01-11 DIAGNOSIS — R80.9 PROTEINURIA, UNSPECIFIED TYPE: ICD-10-CM

## 2018-01-11 LAB
APPEARANCE UR: CLEAR
BACTERIA #/AREA URNS HPF: ABNORMAL /HPF
BILIRUB UR QL STRIP.AUTO: NEGATIVE
COLOR UR: YELLOW
EPI CELLS #/AREA URNS HPF: ABNORMAL /HPF
GLUCOSE UR STRIP.AUTO-MCNC: NEGATIVE MG/DL
HYALINE CASTS #/AREA URNS LPF: ABNORMAL /LPF
KETONES UR STRIP.AUTO-MCNC: NEGATIVE MG/DL
LEUKOCYTE ESTERASE UR QL STRIP.AUTO: ABNORMAL
MICRO URNS: ABNORMAL
NITRITE UR QL STRIP.AUTO: NEGATIVE
PH UR STRIP.AUTO: 5.5 [PH]
PROT UR QL STRIP: NEGATIVE MG/DL
RBC # URNS HPF: ABNORMAL /HPF
RBC UR QL AUTO: NEGATIVE
SP GR UR STRIP.AUTO: 1.02
UROBILINOGEN UR STRIP.AUTO-MCNC: 0.2 MG/DL
WBC #/AREA URNS HPF: ABNORMAL /HPF

## 2018-01-11 PROCEDURE — 81001 URINALYSIS AUTO W/SCOPE: CPT

## 2018-01-16 ENCOUNTER — HOSPITAL ENCOUNTER (OUTPATIENT)
Dept: LAB | Facility: MEDICAL CENTER | Age: 48
End: 2018-01-16
Attending: INTERNAL MEDICINE
Payer: COMMERCIAL

## 2018-01-16 DIAGNOSIS — R76.8 ANA POSITIVE: ICD-10-CM

## 2018-01-16 LAB
ALBUMIN SERPL BCP-MCNC: 4.1 G/DL (ref 3.2–4.9)
ALBUMIN SERPL BCP-MCNC: 4.2 G/DL (ref 3.2–4.9)
ALBUMIN/GLOB SERPL: 1.2 G/DL
ALP SERPL-CCNC: 50 U/L (ref 30–99)
ALP SERPL-CCNC: 50 U/L (ref 30–99)
ALT SERPL-CCNC: 60 U/L (ref 2–50)
ALT SERPL-CCNC: 88 U/L (ref 2–50)
ANION GAP SERPL CALC-SCNC: 10 MMOL/L (ref 0–11.9)
APTT PPP: 28.7 SEC (ref 24.7–36)
AST SERPL-CCNC: 45 U/L (ref 12–45)
AST SERPL-CCNC: 46 U/L (ref 12–45)
BASOPHILS # BLD AUTO: 0.7 % (ref 0–1.8)
BASOPHILS # BLD: 0.04 K/UL (ref 0–0.12)
BILIRUB CONJ SERPL-MCNC: <0.1 MG/DL (ref 0.1–0.5)
BILIRUB INDIRECT SERPL-MCNC: ABNORMAL MG/DL (ref 0–1)
BILIRUB SERPL-MCNC: 0.5 MG/DL (ref 0.1–1.5)
BILIRUB SERPL-MCNC: 0.5 MG/DL (ref 0.1–1.5)
BUN SERPL-MCNC: 14 MG/DL (ref 8–22)
CALCIUM SERPL-MCNC: 9.4 MG/DL (ref 8.5–10.5)
CHLORIDE SERPL-SCNC: 103 MMOL/L (ref 96–112)
CO2 SERPL-SCNC: 24 MMOL/L (ref 20–33)
CREAT SERPL-MCNC: 0.78 MG/DL (ref 0.5–1.4)
CRP SERPL HS-MCNC: 0.89 MG/DL (ref 0–0.75)
EOSINOPHIL # BLD AUTO: 0.1 K/UL (ref 0–0.51)
EOSINOPHIL NFR BLD: 1.6 % (ref 0–6.9)
ERYTHROCYTE [DISTWIDTH] IN BLOOD BY AUTOMATED COUNT: 40.4 FL (ref 35.9–50)
ERYTHROCYTE [SEDIMENTATION RATE] IN BLOOD BY WESTERGREN METHOD: 39 MM/HOUR (ref 0–20)
FERRITIN SERPL-MCNC: 119.4 NG/ML (ref 10–291)
GLOBULIN SER CALC-MCNC: 3.4 G/DL (ref 1.9–3.5)
GLUCOSE SERPL-MCNC: 147 MG/DL (ref 65–99)
HBV SURFACE AG SER QL: NEGATIVE
HCT VFR BLD AUTO: 39.9 % (ref 37–47)
HCV AB SER QL: NEGATIVE
HCV AB SER QL: NEGATIVE
HGB BLD-MCNC: 13.8 G/DL (ref 12–16)
IMM GRANULOCYTES # BLD AUTO: 0.02 K/UL (ref 0–0.11)
IMM GRANULOCYTES NFR BLD AUTO: 0.3 % (ref 0–0.9)
INR PPP: 0.96 (ref 0.87–1.13)
IRON SATN MFR SERPL: 24 % (ref 15–55)
IRON SERPL-MCNC: 81 UG/DL (ref 40–170)
LYMPHOCYTES # BLD AUTO: 1.88 K/UL (ref 1–4.8)
LYMPHOCYTES NFR BLD: 30.8 % (ref 22–41)
MCH RBC QN AUTO: 31.5 PG (ref 27–33)
MCHC RBC AUTO-ENTMCNC: 34.6 G/DL (ref 33.6–35)
MCV RBC AUTO: 91.1 FL (ref 81.4–97.8)
MONOCYTES # BLD AUTO: 0.45 K/UL (ref 0–0.85)
MONOCYTES NFR BLD AUTO: 7.4 % (ref 0–13.4)
NEUTROPHILS # BLD AUTO: 3.61 K/UL (ref 2–7.15)
NEUTROPHILS NFR BLD: 59.2 % (ref 44–72)
NRBC # BLD AUTO: 0 K/UL
NRBC BLD-RTO: 0 /100 WBC
PLATELET # BLD AUTO: 224 K/UL (ref 164–446)
PMV BLD AUTO: 10.2 FL (ref 9–12.9)
POTASSIUM SERPL-SCNC: 4 MMOL/L (ref 3.6–5.5)
PROT SERPL-MCNC: 7.4 G/DL (ref 6–8.2)
PROT SERPL-MCNC: 7.6 G/DL (ref 6–8.2)
PROTHROMBIN TIME: 12.5 SEC (ref 12–14.6)
RBC # BLD AUTO: 4.38 M/UL (ref 4.2–5.4)
SODIUM SERPL-SCNC: 137 MMOL/L (ref 135–145)
TIBC SERPL-MCNC: 335 UG/DL (ref 250–450)
TRANSFERRIN SERPL-MCNC: 238 MG/DL (ref 200–370)
WBC # BLD AUTO: 6.1 K/UL (ref 4.8–10.8)

## 2018-01-16 PROCEDURE — 80053 COMPREHEN METABOLIC PANEL: CPT

## 2018-01-16 PROCEDURE — 86803 HEPATITIS C AB TEST: CPT

## 2018-01-16 PROCEDURE — 36415 COLL VENOUS BLD VENIPUNCTURE: CPT

## 2018-01-16 PROCEDURE — 80076 HEPATIC FUNCTION PANEL: CPT

## 2018-01-16 PROCEDURE — 82728 ASSAY OF FERRITIN: CPT

## 2018-01-16 PROCEDURE — 85610 PROTHROMBIN TIME: CPT

## 2018-01-16 PROCEDURE — 83540 ASSAY OF IRON: CPT

## 2018-01-16 PROCEDURE — 86140 C-REACTIVE PROTEIN: CPT

## 2018-01-16 PROCEDURE — 86803 HEPATITIS C AB TEST: CPT | Mod: 91

## 2018-01-16 PROCEDURE — 82390 ASSAY OF CERULOPLASMIN: CPT

## 2018-01-16 PROCEDURE — 85025 COMPLETE CBC W/AUTO DIFF WBC: CPT

## 2018-01-16 PROCEDURE — 84466 ASSAY OF TRANSFERRIN: CPT

## 2018-01-16 PROCEDURE — 85730 THROMBOPLASTIN TIME PARTIAL: CPT

## 2018-01-16 PROCEDURE — 87340 HEPATITIS B SURFACE AG IA: CPT

## 2018-01-16 PROCEDURE — 83550 IRON BINDING TEST: CPT

## 2018-01-16 PROCEDURE — 85652 RBC SED RATE AUTOMATED: CPT

## 2018-01-17 LAB — CERULOPLASMIN SERPL-MCNC: 29 MG/DL (ref 17–54)

## 2018-01-19 RX ORDER — METFORMIN HYDROCHLORIDE 500 MG/1
TABLET, EXTENDED RELEASE ORAL
Qty: 120 TAB | Refills: 0 | Status: SHIPPED | OUTPATIENT
Start: 2018-01-19 | End: 2018-01-22 | Stop reason: SDUPTHER

## 2018-01-20 NOTE — TELEPHONE ENCOUNTER
Pt was told 11/17 to make appt and that has not been done. Please advise pt only 30 days will be sent to pharmacy and next request will be denied.

## 2018-01-22 DIAGNOSIS — E11.9 CONTROLLED TYPE 2 DIABETES MELLITUS WITHOUT COMPLICATION, WITHOUT LONG-TERM CURRENT USE OF INSULIN (HCC): ICD-10-CM

## 2018-01-22 RX ORDER — METFORMIN HYDROCHLORIDE 500 MG/1
TABLET, EXTENDED RELEASE ORAL
Qty: 360 TAB | Refills: 0 | Status: SHIPPED | OUTPATIENT
Start: 2018-01-22 | End: 2018-05-15 | Stop reason: SDUPTHER

## 2018-01-26 ENCOUNTER — HOSPITAL ENCOUNTER (OUTPATIENT)
Dept: LAB | Facility: MEDICAL CENTER | Age: 48
End: 2018-01-26
Attending: INTERNAL MEDICINE
Payer: COMMERCIAL

## 2018-01-26 ENCOUNTER — HOSPITAL ENCOUNTER (OUTPATIENT)
Dept: LAB | Facility: MEDICAL CENTER | Age: 48
End: 2018-01-26
Attending: NURSE PRACTITIONER
Payer: COMMERCIAL

## 2018-01-26 DIAGNOSIS — E11.9 CONTROLLED TYPE 2 DIABETES MELLITUS WITHOUT COMPLICATION, WITHOUT LONG-TERM CURRENT USE OF INSULIN (HCC): ICD-10-CM

## 2018-01-26 LAB
25(OH)D3 SERPL-MCNC: 28 NG/ML (ref 30–100)
ALBUMIN SERPL BCP-MCNC: 4 G/DL (ref 3.2–4.9)
ALBUMIN/GLOB SERPL: 1.2 G/DL
ALP SERPL-CCNC: 43 U/L (ref 30–99)
ALT SERPL-CCNC: 64 U/L (ref 2–50)
ANION GAP SERPL CALC-SCNC: 8 MMOL/L (ref 0–11.9)
AST SERPL-CCNC: 38 U/L (ref 12–45)
BASOPHILS # BLD AUTO: 0.7 % (ref 0–1.8)
BASOPHILS # BLD: 0.05 K/UL (ref 0–0.12)
BILIRUB SERPL-MCNC: 0.5 MG/DL (ref 0.1–1.5)
BUN SERPL-MCNC: 17 MG/DL (ref 8–22)
CALCIUM SERPL-MCNC: 9 MG/DL (ref 8.5–10.5)
CHLORIDE SERPL-SCNC: 105 MMOL/L (ref 96–112)
CHOLEST SERPL-MCNC: 174 MG/DL (ref 100–199)
CO2 SERPL-SCNC: 22 MMOL/L (ref 20–33)
CREAT SERPL-MCNC: 0.65 MG/DL (ref 0.5–1.4)
CREAT UR-MCNC: 261.2 MG/DL
EOSINOPHIL # BLD AUTO: 0.12 K/UL (ref 0–0.51)
EOSINOPHIL NFR BLD: 1.7 % (ref 0–6.9)
ERYTHROCYTE [DISTWIDTH] IN BLOOD BY AUTOMATED COUNT: 43.5 FL (ref 35.9–50)
EST. AVERAGE GLUCOSE BLD GHB EST-MCNC: 131 MG/DL
GLOBULIN SER CALC-MCNC: 3.3 G/DL (ref 1.9–3.5)
GLUCOSE SERPL-MCNC: 138 MG/DL (ref 65–99)
HBA1C MFR BLD: 6.2 % (ref 0–5.6)
HCT VFR BLD AUTO: 40.7 % (ref 37–47)
HDLC SERPL-MCNC: 48 MG/DL
HGB BLD-MCNC: 13.2 G/DL (ref 12–16)
IMM GRANULOCYTES # BLD AUTO: 0.04 K/UL (ref 0–0.11)
IMM GRANULOCYTES NFR BLD AUTO: 0.6 % (ref 0–0.9)
LDLC SERPL CALC-MCNC: 100 MG/DL
LYMPHOCYTES # BLD AUTO: 2 K/UL (ref 1–4.8)
LYMPHOCYTES NFR BLD: 28.1 % (ref 22–41)
MCH RBC QN AUTO: 30.4 PG (ref 27–33)
MCHC RBC AUTO-ENTMCNC: 32.4 G/DL (ref 33.6–35)
MCV RBC AUTO: 93.8 FL (ref 81.4–97.8)
MICROALBUMIN UR-MCNC: 1.4 MG/DL
MICROALBUMIN/CREAT UR: 5 MG/G (ref 0–30)
MONOCYTES # BLD AUTO: 0.63 K/UL (ref 0–0.85)
MONOCYTES NFR BLD AUTO: 8.8 % (ref 0–13.4)
NEUTROPHILS # BLD AUTO: 4.28 K/UL (ref 2–7.15)
NEUTROPHILS NFR BLD: 60.1 % (ref 44–72)
NRBC # BLD AUTO: 0 K/UL
NRBC BLD-RTO: 0 /100 WBC
PLATELET # BLD AUTO: 245 K/UL (ref 164–446)
PMV BLD AUTO: 10.5 FL (ref 9–12.9)
POTASSIUM SERPL-SCNC: 3.9 MMOL/L (ref 3.6–5.5)
PROT SERPL-MCNC: 7.3 G/DL (ref 6–8.2)
RBC # BLD AUTO: 4.34 M/UL (ref 4.2–5.4)
SODIUM SERPL-SCNC: 135 MMOL/L (ref 135–145)
TRIGL SERPL-MCNC: 130 MG/DL (ref 0–149)
WBC # BLD AUTO: 7.1 K/UL (ref 4.8–10.8)

## 2018-01-26 PROCEDURE — 80053 COMPREHEN METABOLIC PANEL: CPT

## 2018-01-26 PROCEDURE — 82306 VITAMIN D 25 HYDROXY: CPT

## 2018-01-26 PROCEDURE — 36415 COLL VENOUS BLD VENIPUNCTURE: CPT

## 2018-01-26 PROCEDURE — 80061 LIPID PANEL: CPT

## 2018-01-26 PROCEDURE — 85025 COMPLETE CBC W/AUTO DIFF WBC: CPT

## 2018-01-26 PROCEDURE — 82043 UR ALBUMIN QUANTITATIVE: CPT

## 2018-01-26 PROCEDURE — 82570 ASSAY OF URINE CREATININE: CPT

## 2018-01-26 PROCEDURE — 83036 HEMOGLOBIN GLYCOSYLATED A1C: CPT

## 2018-02-26 ENCOUNTER — TELEPHONE (OUTPATIENT)
Dept: MEDICAL GROUP | Facility: PHYSICIAN GROUP | Age: 48
End: 2018-02-26

## 2018-03-06 RX ORDER — ATORVASTATIN CALCIUM 20 MG/1
TABLET, FILM COATED ORAL
Qty: 90 TAB | Refills: 0 | Status: SHIPPED | OUTPATIENT
Start: 2018-03-06 | End: 2020-02-06 | Stop reason: SDUPTHER

## 2018-03-06 NOTE — TELEPHONE ENCOUNTER
Was the patient seen in the last year in this department? No     Does patient have an active prescription for medications requested? No     Received Request Via: Pharmacy      Pt met protocol?: Yes, last ov 7/5/17,    Lab Results  Component Value Date/Time   CHOLSTRLTOT 174 01/26/2018 0647   TRIGLYCERIDE 130 01/26/2018 0647   HDL 48 01/26/2018 0647    (H) 01/26/2018 0647

## 2018-03-08 ENCOUNTER — TELEPHONE (OUTPATIENT)
Dept: MEDICAL GROUP | Facility: PHYSICIAN GROUP | Age: 48
End: 2018-03-08

## 2018-03-08 NOTE — TELEPHONE ENCOUNTER
Future Appointments       Provider Department Center    3/9/2018 3:45 PM Loulou Seals P.A.-C. [facility] [place]    4/11/2018 10:00 AM Lulu Wilburn M.D. [facility]         ESTABLISHED PATIENT PRE-VISIT PLANNING     Note: Patient will not be contacted if there is no indication to call.     1.  Reviewed notes from the last few office visits within the medical group: Yes 07/05/2017    2.  If any orders were placed at last visit or intended to be done for this visit (i.e. 6 mos follow-up), do we have Results/Consult Notes?        •  Labs - Labs ordered, completed on 01/16/2018 and results are in chart.       •  Imaging - Imaging ordered, completed and results are in chart.       •  Referrals - Referral ordered, patient was seen and consult notes are in chart. Care Teams updated  YES.    3. Is this appointment scheduled as a Hospital Follow-Up? No    4.  Immunizations were updated in [facility] using WebIZ?: Yes       •  Web Iz Recommendations: FLU, TD and VARICELLA (Chicken Pox)     5.  Patient is due for the following Health Maintenance Topics:   Health Maintenance Due   Topic Date Due   • IMM HEP B VACCINE (1 of 3 - Primary Series) 1970   • IMM PNEUMOCOCCAL (1 of 1 - PPSV23) 10/03/1990   • PAP SMEAR  02/25/2016   • RETINAL SCREENING  12/11/2016   • IMM INFLUENZA (1) 09/01/2017   • DIABETES MONOFILAMENT / LE EXAM  10/06/2017       6.  MDX printed and highlighted for Provider? N\A INS: [employer]     7.  Patient was informed to arrive 15 min prior to their scheduled appointment and bring in their medication bottles.

## 2018-03-09 ENCOUNTER — OFFICE VISIT (OUTPATIENT)
Dept: MEDICAL GROUP | Facility: PHYSICIAN GROUP | Age: 48
End: 2018-03-09
Payer: COMMERCIAL

## 2018-03-09 VITALS
HEIGHT: 64 IN | TEMPERATURE: 98.5 F | OXYGEN SATURATION: 95 % | WEIGHT: 228 LBS | DIASTOLIC BLOOD PRESSURE: 62 MMHG | BODY MASS INDEX: 38.93 KG/M2 | SYSTOLIC BLOOD PRESSURE: 102 MMHG | HEART RATE: 80 BPM

## 2018-03-09 DIAGNOSIS — E11.9 CONTROLLED TYPE 2 DIABETES MELLITUS WITHOUT COMPLICATION, WITHOUT LONG-TERM CURRENT USE OF INSULIN (HCC): ICD-10-CM

## 2018-03-09 DIAGNOSIS — K76.0 NONALCOHOLIC FATTY LIVER DISEASE: ICD-10-CM

## 2018-03-09 DIAGNOSIS — E55.9 VITAMIN D DEFICIENCY: ICD-10-CM

## 2018-03-09 DIAGNOSIS — E66.9 OBESITY (BMI 30-39.9): ICD-10-CM

## 2018-03-09 DIAGNOSIS — E11.69 HYPERLIPIDEMIA ASSOCIATED WITH TYPE 2 DIABETES MELLITUS (HCC): ICD-10-CM

## 2018-03-09 DIAGNOSIS — E78.5 HYPERLIPIDEMIA ASSOCIATED WITH TYPE 2 DIABETES MELLITUS (HCC): ICD-10-CM

## 2018-03-09 PROCEDURE — 99214 OFFICE O/P EST MOD 30 MIN: CPT | Performed by: PHYSICIAN ASSISTANT

## 2018-03-09 ASSESSMENT — PATIENT HEALTH QUESTIONNAIRE - PHQ9: CLINICAL INTERPRETATION OF PHQ2 SCORE: 0

## 2018-03-09 NOTE — LETTER
[facility]  SAMY Roach  [address]  [place] [address]  Fax: [phone]   Authorization for Release/Disclosure of   Protected Health Information   Name: AUBREY JARAMILLO : 1970 SSN: [id number]   Address: [address] [place] [address]  [place] [address] Phone:    [phone] (home)    I authorize the entity listed below to release/disclose the PHI below to:   [facility]/SAMY Roach and Loulou Seals P.A.-C.   Provider or Entity Name:  Dr. Parker    [facility]   Phone:      Fax:  [phone]   Reason for request: continuity of care   Information to be released:    [  ] LAST COLONOSCOPY,  including any PATH REPORT and follow-up  [  ] LAST FIT/COLOGUARD RESULT [  ] LAST DEXA  [  ] LAST MAMMOGRAM  [  ] LAST PAP  [  ] LAST LABS [xx  ] RETINA EXAM REPORT  [  ] IMMUNIZATION RECORDS  [  ] Release all info      [  ] Check here and initial the line next to each item to release ALL health information INCLUDING  _____ Care and treatment for drug and / or alcohol abuse  _____ HIV testing, infection status, or AIDS  _____ Genetic Testing    DATES OF SERVICE OR TIME PERIOD TO BE DISCLOSED: _____________  I understand and acknowledge that:  * This Authorization may be revoked at any time by you in writing, except if your health information has already been used or disclosed.  * Your health information that will be used or disclosed as a result of you signing this authorization could be re-disclosed by the recipient. If this occurs, your re-disclosed health information may no longer be protected by State or Federal laws.  * You may refuse to sign this Authorization. Your refusal will not affect your ability to obtain treatment.  * This Authorization becomes effective upon signing and will  on (date) __________.      If no date is indicated, this Authorization will  one (1) year from the signature date.    Name: Aubrey Smyth  Marlow    Signature:   Date:     3/9/2018       PLEASE FAX REQUESTED RECORDS BACK TO: [phone]

## 2018-03-12 NOTE — ASSESSMENT & PLAN NOTE
Historically treated with atorvastatin, but discontinued medication in 10/2017 due to elevated liver enzymes. She recently re-started medication 3 weeks ago. Last lipid panel in 1/2018.

## 2018-03-12 NOTE — PROGRESS NOTES
Subjective:   Radha Marlow is a 47 y.o. female here today for follow-up on diabetes, hyperlipidemia, and other chronic medical conditions. Is an established patient of KATHLEEN Clinton.    HPI: Patient has the following current medical problems:    Nonalcoholic fatty liver disease  Patient recently started following with GI due to elevated liver enzymes. Underwent liver biopsy which she states was consistent with NAFLD. She has made a commitment to herself to lose weight. States she started the Weight Watchers program 3 weeks ago and has steadily been losing weight.    Obesity (BMI 30-39.9)  Current BMI 39.14. Actively trying to lose weight.    Vitamin D deficiency  Currently on daily vitamin D supplement. Vitamin D level last checked in 1/2018.    Controlled type 2 diabetes mellitus without complication, without long-term current use of insulin (CMS-[facility])  Currently on metformin ER 1000 mg BID. Last A1c in 1/2018.    Hyperlipidemia associated with type 2 diabetes mellitus  Historically treated with atorvastatin, but discontinued medication in 10/2017 due to elevated liver enzymes. She recently re-started medication 3 weeks ago. Last lipid panel in 1/2018.       Current medicines (including changes today)  Current Outpatient Prescriptions   Medication Sig Dispense Refill   • DOXYCYCLINE PO Take  by mouth.     • atorvastatin (LIPITOR) 20 MG Tab TAKE 1 TAB BY MOUTH EVERY DAY. 90 Tab 0   • metformin ER (GLUCOPHAGE XR) 500 MG TABLET SR 24 HR TAKE 2 TABLETS BY MOUTH 2 TIMES A DAY. 360 Tab 0   • B Complex Vitamins (VITAMIN B COMPLEX PO) Take  by mouth.     • ibuprofen (MOTRIN) 200 MG Tab Take 200 mg by mouth every 6 hours as needed.     • metronidazole (METROCREAM) 0.75 % cream Apply  to affected area(s) 2 times a day.     • Cholecalciferol (VITAMIN D) 2000 UNITS Cap Take  by mouth.     • Blood Glucose Monitoring Suppl Device Meter: Dispense insurance specific meter. Sig. Use as directed for blood sugar  "monitoring. #1. NR. DX: E11.9 1 Device 0   • Lancets Misc Lancets order: Lancets for insurance specific meter. Sig: use qid and prn ssx high or low sugar. #100 RF x 3 DX: E11.9 100 Each 3   • Blood Glucose Monitoring Suppl SUPPLIES Misc Test strips order: Test strips for insurance specific meter. Sig: use qid and prn ssx high or low sugar #100 RF x 3. DX: E11.9 100 Each 3   • Multiple Vitamin (DAILY VITAMINS PO) Take  by mouth.     • fluticasone (FLONASE) 50 MCG/ACT nasal spray Spray 1 Spray in nose 2 times a day. Each Nostril 1 Bottle 3   • ONE TOUCH ULTRA TEST strip USE 4 TIMES A DAY AND AS NEEDED FOR HIGH OR LOW BLOOD SUGAR  3   • BLISOVI FE 1/20 1-20 MG-MCG per tablet 1 Tab every day.  3     No current facility-administered medications for this visit.      She  has a past medical history of Allergy; Anemia; Arthritis; Hyperlipidemia; and Hypertriglyceridemia (3/17/2010). She also has no past medical history of Breast cancer (CMS-HCC).    ROS  Pulmonary ROS: No shortness of breath  Cardiovascular ROS: No chest pain  Neurologic ROS: No numbness, No tingling       Objective:     Blood pressure 102/62, pulse 80, temperature 36.9 °C (98.5 °F), height 1.626 m (5' 4\"), weight 103.4 kg (228 lb), SpO2 95 %. Body mass index is 39.14 kg/m².     Physical Exam:  Constitutional: Alert, well-appearing, no distress.  Skin: No rashes in visible areas.  Eye: Conjunctiva clear, lids normal.  ENMT: Lips without lesions, moist mucus membranes.  Respiratory: Unlabored respiratory effort, lungs clear to auscultation, no wheezes, no rhonchi.  Cardiovascular: Normal S1, S2, no murmur, no lower extremity edema.      Assessment and Plan:   The following treatment plan was discussed    1. Controlled type 2 diabetes mellitus without complication, without long-term current use of insulin (CMS-HCC)  Chronic issue, well-controlled on metformin Er. Last A1c from 1/2018 reviewed which was 6.2. Patient advised to continue metformin. Encouraged on " her efforts to improve her diet.    2. Hyperlipidemia associated with type 2 diabetes mellitus (CMS-HCC)  Chronic issue, historically well-controlled on atorvastatin. Reviewed lipid panel from 1/2018 which showed LDL to be above goal, but patient was off statin when lab was performed, so expect that this has improved since she re-started medication.    Cholesterol,Tot 174  100 - 199 mg/dL Final   Triglycerides 130  0 - 149 mg/dL Final   HDL 48  >=40 mg/dL Final      <100 mg/dL Final      3. Nonalcoholic fatty liver disease  Chronic active issue, currently trying to lose weight which I explained is the typical treatment for this condition. LFTs improved on most recent labs done in 1/2018--only mild elevated of ALT. Reviewed most recent GI note in chart from 3/6/18, which states that Radha will be having repeat liver enzymes in 3 months with follow up office visit in 4 months.    4. Vitamin D deficiency  Chronic issue, most recent vitamin D level reviewed from 1/2018 which was mildly decreased at 28. Patient advised to continue OTC supplementation.    5. Obesity (BMI 30-39.9)  - Patient identified as having weight management issue.  Appropriate orders and counseling given.      Followup: Return in about 6 months (around 9/9/2018) for f/u diabetes,chronic conditions; Short.    Loulou Seals P.A.-C.

## 2018-03-12 NOTE — ASSESSMENT & PLAN NOTE
Patient recently started following with GI due to elevated liver enzymes. Underwent liver biopsy which she states was consistent with NAFLD. She has made a commitment to herself to lose weight. States she started the Weight Watchers program 3 weeks ago and has steadily been losing weight.

## 2018-04-06 ENCOUNTER — HOSPITAL ENCOUNTER (OUTPATIENT)
Dept: LAB | Facility: MEDICAL CENTER | Age: 48
End: 2018-04-06
Attending: INTERNAL MEDICINE
Payer: COMMERCIAL

## 2018-04-09 ENCOUNTER — HOSPITAL ENCOUNTER (OUTPATIENT)
Dept: LAB | Facility: MEDICAL CENTER | Age: 48
End: 2018-04-09
Attending: INTERNAL MEDICINE
Payer: COMMERCIAL

## 2018-04-09 PROCEDURE — 84160 ASSAY OF PROTEIN ANY SOURCE: CPT

## 2018-04-09 PROCEDURE — 84165 PROTEIN E-PHORESIS SERUM: CPT

## 2018-04-09 PROCEDURE — 36415 COLL VENOUS BLD VENIPUNCTURE: CPT

## 2018-04-09 PROCEDURE — 86038 ANTINUCLEAR ANTIBODIES: CPT

## 2018-04-11 ENCOUNTER — OFFICE VISIT (OUTPATIENT)
Dept: RHEUMATOLOGY | Facility: PHYSICIAN GROUP | Age: 48
End: 2018-04-11
Payer: COMMERCIAL

## 2018-04-11 VITALS
HEART RATE: 79 BPM | SYSTOLIC BLOOD PRESSURE: 110 MMHG | BODY MASS INDEX: 38.83 KG/M2 | WEIGHT: 226.2 LBS | RESPIRATION RATE: 14 BRPM | TEMPERATURE: 98 F | DIASTOLIC BLOOD PRESSURE: 80 MMHG | OXYGEN SATURATION: 94 %

## 2018-04-11 DIAGNOSIS — R74.01 TRANSAMINITIS: ICD-10-CM

## 2018-04-11 DIAGNOSIS — R76.8 POSITIVE ANA (ANTINUCLEAR ANTIBODY): ICD-10-CM

## 2018-04-11 DIAGNOSIS — M25.50 ARTHRALGIA, UNSPECIFIED JOINT: ICD-10-CM

## 2018-04-11 LAB
ALBUMIN SERPL-MCNC: 4.24 G/DL (ref 3.75–5.01)
ALPHA1 GLOB SERPL ELPH-MCNC: 0.32 G/DL (ref 0.19–0.46)
ALPHA2 GLOB SERPL ELPH-MCNC: 0.95 G/DL (ref 0.48–1.05)
B-GLOBULIN SERPL ELPH-MCNC: 0.95 G/DL (ref 0.48–1.1)
EER PROT ELECT SER Q1092: NORMAL
GAMMA GLOB SERPL ELPH-MCNC: 1.34 G/DL (ref 0.62–1.51)
INTERPRETATION SERPL IFE-IMP: NORMAL
NUCLEAR IGG SER QL IA: DETECTED
NUCLEAR IGG TITR SER IF: ABNORMAL {TITER}
PROT SERPL-MCNC: 7.8 G/DL (ref 6–8.3)

## 2018-04-11 PROCEDURE — 99213 OFFICE O/P EST LOW 20 MIN: CPT | Performed by: INTERNAL MEDICINE

## 2018-04-11 ASSESSMENT — ENCOUNTER SYMPTOMS
CHILLS: 0
FEVER: 0

## 2018-04-11 NOTE — PROGRESS NOTES
Subjective:   Date of Consultation:4/11/2018  9:49 AM  Primary care physician:SAMY Roach      Reason for Consultation:    Ms. Jaramillo  is a pleasant 46 y.o. year old female who presented with follow-up for positive SUMMER    Positive SUMMER  Energy level is improved.  Taking vitamin D.  Hair loss may have stabilized.  She has mild stiffness in the morning.  Right now has plantar fasciitis and doing exercises.      Weight loss  With 17 intentional weight loss  With weight watchers  Recognize she could hike 17 miles with minimal shortness of breath.    Raised ESR  She denies recent infection    Elevated ALT with elevated AMA antibody  At last visit we referred her to GI for evaluation for autoimmune hepatitis.  She has not seen GI.  Will see GI this Friday.  Historically it has been intermittently elevated  She does have an anti mitochondrial antibody that is equivocal.  Previously she was diagnosed with fatty liver with grade 2 chronic portal inflammation and some lobular inflammation  She did have an ultrasound in 8/2016 and noted fatty liver.      Her RUQ pain is improved and has dissipated a lot  ___________________________  Biopsy showed mainly fatty liver. 40% but had minor inflammation concern    Abnormal UA  Noted on recent labs  Has not repeated them    Rash/Rosacea  She is now off doxycycline.  Her Rosacea is not acting up.  Her joint pain are the same with no change.      Diabetes  (not addressed)      RHEUM HISTORY:  I first met Ms. Radha Jaramillo with a history of positive SUMMER on 8/2/2017.  She was noted to have a persistently elevated ESR.  She otherwise denied prolonged morning stiffness, photosensitivity, sicca symptoms, or Raynauds or eye inflammation.  She did have a mild rash on the anterior chest.    Pertinent Serologies  Results for RADHA JARAMILLO (MRN [id number]) as of 8/2/2017 15:20    Ref. Range 6/13/2017 11:28 7/10/2017 09:15   C3 Complement Latest Ref Range: 87.0-200.0 mg/dL    174.0   Complement C4 Latest Ref Range: 19.0-52.0 mg/dL   33.0   Complement Total Hemolytic Latest Ref Range:   Units   185 (H)   Stat C-Reactive Protein Latest Ref Range: 0.00-0.75 mg/dL 0.89 (H)     Anti-Dna -Ds Latest Ref Range: None Detected  None Detected None Detected   Antinuclear Antibody Latest Ref Range: None Detected  Detected (A)     Rnp Antibodies Latest Ref Range: 0-40 AU/mL 0     Smith Antibodies Latest Ref Range: 0-40 AU/mL 0 0   SSA 52 (R0)(ERVIN) Ab, IgG Latest Ref Range: 0-40 AU/mL 8 4   SSA 60 (R0)(ERVIN) Ab, IgG Latest Ref Range: 0-40 AU/mL 4 1   Sjogren'S Anti-Ss-B Latest Ref Range: 0-40 AU/mL 0     SUMMER Titer Latest Ref Range: <1:40  1:5120 (H)        Results for AUBREY JARAMILLO (MRN [id number]) as of 2017 15:25    Ref. Range 10/1/2012 11:58 2015 07:03   Microsomal -Tpo- Abs Latest Ref Range: 0.0-9.0 IU/mL 0.4 0.4   Anti-Thyroglobulin Latest Ref Range: 0.0-4.0 IU/mL <0.9 <0.9   Gliadin Antibodies Iga Latest Ref Range: 0-19 Units 9        Results for AUBREY JARAMILLO (MRN [id number]) as of 2017 19:07   Ref. Range 2017 08:36   HLA-B27 Screen Latest Ref Range: Negative  Negative     Results for AUBREY JARAMILLO (MRN [id number]) as of 2017 19:07   Ref. Range 2017 08:35   Rheumatoid Factor -Neph- Latest Ref Range: 0 - 14 IU/mL <10       Results for AUBREY JARAMILLO (MRN [id number]) as of 2017 19:07   Ref. Range 2017 08:35   Anti-Mitochondrial Ab Latest Ref Range: 0.0 - 20.0 Units 23.8 (H)   Microsomal -Tpo- Abs Latest Ref Range: 0.0 - 9.0 IU/mL 0.6   Anti-Thyroglobulin Latest Ref Range: 0.0 - 4.0 IU/mL <0.9   F-Actin Ab, IgG Latest Ref Range: 0 - 19 Units 18   Anti-Scl-70 Latest Ref Range: 0 - 40 AU/mL 0   Susanne-1 Antibody, IgG Latest Ref Range: 0 - 40 AU/mL 0   Anti-Centromere B Ab Latest Ref Range: 0 - 40 AU/mL 0   Ccp Antibodies Latest Ref Range: 0 - 19 Units 17   Chromatin Ab, IgG Latest Ref Range: 0 - 19 Units 3     Results for AUBREY JARAMILLO  (MRN [id number]) as of 10/17/2017 15:55   Ref. Range 10/2/2017 08:55   Anti-Mitochondrial Ab Latest Ref Range: 0.0 - 20.0 Units 25.9 (H)   F-Actin Ab, IgG Latest Ref Range: 0 - 19 Units 16   Anti-Histone Antibodies Latest Ref Range: 0.0 - 0.9 Units 0.5           Past Medical HIstory: DM, infertility (at age 16 she experienced amenorrhea and diagnosed with PCOS managed with birth control), no miscarriages, gestational diabetes with 3 kids, premature births (@ 33 weeks, @ 37 weeks, @ 38 weeks).        Social History: not a current smoker, no alcohol, school as a  graduated 12/2017 [employer]     Family History: son has very dry eyes ad has chronic hives, heart stents, DM,    Past Medical History:   Diagnosis Date   • Allergy    • Anemia    • Arthritis    • Hyperlipidemia    • Hypertriglyceridemia 3/17/2010     No past surgical history on file.  Allergies   Allergen Reactions   • Bactrim [Sulfamethoxazole W-Trimethoprim] Unspecified     Rash    • No Known Drug Allergy      Outpatient Encounter Prescriptions as of 4/11/2018   Medication Sig Dispense Refill   • DOXYCYCLINE PO Take  by mouth.     • atorvastatin (LIPITOR) 20 MG Tab TAKE 1 TAB BY MOUTH EVERY DAY. 90 Tab 0   • metformin ER (GLUCOPHAGE XR) 500 MG TABLET SR 24 HR TAKE 2 TABLETS BY MOUTH 2 TIMES A DAY. 360 Tab 0   • B Complex Vitamins (VITAMIN B COMPLEX PO) Take  by mouth.     • ibuprofen (MOTRIN) 200 MG Tab Take 200 mg by mouth every 6 hours as needed.     • metronidazole (METROCREAM) 0.75 % cream Apply  to affected area(s) 2 times a day.     • ONE TOUCH ULTRA TEST strip USE 4 TIMES A DAY AND AS NEEDED FOR HIGH OR LOW BLOOD SUGAR  3   • BLISOVI FE 1/20 1-20 MG-MCG per tablet 1 Tab every day.  3   • Cholecalciferol (VITAMIN D) 2000 UNITS Cap Take  by mouth.     • Blood Glucose Monitoring Suppl Device Meter: Dispense insurance specific meter. Sig. Use as directed for blood sugar monitoring. #1. NR. DX: E11.9 1 Device 0   • Lancets Misc Lancets  order: Lancets for insurance specific meter. Sig: use qid and prn ssx high or low sugar. #100 RF x 3 DX: E11.9 100 Each 3   • Blood Glucose Monitoring Suppl SUPPLIES Misc Test strips order: Test strips for insurance specific meter. Sig: use qid and prn ssx high or low sugar #100 RF x 3. DX: E11.9 100 Each 3   • Multiple Vitamin (DAILY VITAMINS PO) Take  by mouth.     • fluticasone (FLONASE) 50 MCG/ACT nasal spray Spray 1 Spray in nose 2 times a day. Each Nostril 1 Bottle 3     No facility-administered encounter medications on file as of 2018.        Social History     Social History   • Marital status:      Spouse name: N/A   • Number of children: 4   • Years of education: N/A     Occupational History   • student early childhood education           Social History Main Topics   • Smoking status: Former Smoker     Packs/day: 0.25     Years: 1.00     Types: Cigarettes     Quit date: 1991   • Smokeless tobacco: Never Used   • Alcohol use No      Comment: Restorationist prohibits   • Drug use: No   • Sexual activity: Yes     Partners: Male     Birth control/ protection: Surgical     Other Topics Concern   • Not on file     Social History Narrative   • No narrative on file      History   Smoking Status   • Former Smoker   • Packs/day: 0.25   • Years: 1.00   • Types: Cigarettes   • Quit date: 1991   Smokeless Tobacco   • Never Used     History   Alcohol Use No     Comment: [employer]oration[employer] prohibits     History   Drug Use No      OB History    Para Term  AB Living   4 4 1 3       SAB TAB Ectopic Molar Multiple Live Births                    # Outcome Date GA Lbr Nixon/2nd Weight Sex Delivery Anes PTL Lv   4             3             2             1 Term                  No LMP recorded.    G P A L     Family History   Problem Relation Age of Onset   • Diabetes Mother    • Heart Disease Father    • Hypertension Father    • Hyperlipidemia Father    • Diabetes Brother     • Heart Disease Brother    • Hypertension Brother    • Hyperlipidemia Brother    • Diabetes Maternal Aunt    • Diabetes Maternal Uncle    • Cancer Maternal Grandmother      Cervical/ovarian   • Diabetes Maternal Grandfather    • Heart Disease Maternal Grandfather    • Hypertension Maternal Grandfather    • Hyperlipidemia Maternal Grandfather    • Stroke Maternal Grandfather    • Stroke Paternal Grandmother    • Hyperlipidemia Paternal Grandmother    • Hypertension Paternal Grandmother    • Heart Disease Paternal Grandmother    • Heart Disease Paternal Grandfather    • Hypertension Paternal Grandfather    • Hyperlipidemia Paternal Grandfather        Review of Systems   Constitutional: Negative for chills and fever.   Cardiovascular: Negative for chest pain.   Musculoskeletal: Positive for joint pain.   Skin: Negative for rash.        Objective:   /80   Pulse 79   Temp 36.7 °C (98 °F)   Resp 14   Wt 102.6 kg (226 lb 3.2 oz)   SpO2 94%   BMI 38.83 kg/m²     Physical Exam   Constitutional: She is oriented to person, place, and time. She appears well-developed and well-nourished. No distress.   HENT:   Head: Normocephalic and atraumatic.   Right Ear: External ear normal.   Left Ear: External ear normal.   Eyes: Conjunctivae are normal. Right eye exhibits no discharge. Left eye exhibits no discharge. No scleral icterus.   Pulmonary/Chest: Effort normal. No stridor. No respiratory distress.   Abdominal: Soft. She exhibits no distension. There is no tenderness.   No hepatomegaly   Musculoskeletal: She exhibits no edema.   No synovitis appreciated in the upper extremities   Neurological: She is alert and oriented to person, place, and time.   Skin: Skin is warm and dry. No rash noted. She is not diaphoretic. No erythema.   Limited skin exam.  No overt malar rash   Psychiatric: She has a normal mood and affect. Her behavior is normal. Judgment and thought content normal.   Vitals reviewed.      Assessment:      1. Positive SUMMER (antinuclear antibody)  CBC WITH DIFFERENTIAL    CRP QUANTITIVE (NON-CARDIAC)    WESTERGREN SED RATE    COMP METABOLIC PANEL    SUMMER TITER    ANTI-DNA (DS)   2. Arthralgia, unspecified joint  CBC WITH DIFFERENTIAL    CRP QUANTITIVE (NON-CARDIAC)    WESTERGREN SED RATE    COMP METABOLIC PANEL    SUMMER TITER    ANTI-DNA (DS)   3. Transaminitis  CBC WITH DIFFERENTIAL    CRP QUANTITIVE (NON-CARDIAC)    WESTERGREN SED RATE    COMP METABOLIC PANEL    SUMMER TITER    ANTI-DNA (DS)     Labs:  [unfilled]    No results found for: QNTTBGOLD  Lab Results   Component Value Date/Time    HEPBSAG Negative 01/16/2018 09:12 AM     Lab Results   Component Value Date/Time    HEPCAB Negative 01/16/2018 09:12 AM     Lab Results   Component Value Date/Time    SODIUM 135 01/26/2018 06:47 AM    POTASSIUM 3.9 01/26/2018 06:47 AM    CHLORIDE 105 01/26/2018 06:47 AM    CO2 22 01/26/2018 06:47 AM    GLUCOSE 138 (H) 01/26/2018 06:47 AM    BUN 17 01/26/2018 06:47 AM    CREATININE 0.65 01/26/2018 06:47 AM    CREATININE 0.82 04/11/2016    BUNCREATRAT 23 03/09/2010 07:15 AM    GLOMRATE >59 03/09/2010 07:15 AM      Lab Results   Component Value Date/Time    WBC 7.1 01/26/2018 06:46 AM    WBC 6.9 03/09/2010 07:15 AM    RBC 4.34 01/26/2018 06:46 AM    RBC 4.50 03/09/2010 07:15 AM    HEMOGLOBIN 13.2 01/26/2018 06:46 AM    HEMATOCRIT 40.7 01/26/2018 06:46 AM    MCV 93.8 01/26/2018 06:46 AM    MCV 93 03/09/2010 07:15 AM    MCH 30.4 01/26/2018 06:46 AM    MCH 31.3 03/09/2010 07:15 AM    MCHC 32.4 (L) 01/26/2018 06:46 AM    MPV 10.5 01/26/2018 06:46 AM    NEUTSPOLYS 60.10 01/26/2018 06:46 AM    LYMPHOCYTES 28.10 01/26/2018 06:46 AM    MONOCYTES 8.80 01/26/2018 06:46 AM    EOSINOPHILS 1.70 01/26/2018 06:46 AM    BASOPHILS 0.70 01/26/2018 06:46 AM      Lab Results   Component Value Date/Time    CALCIUM 9.0 01/26/2018 06:47 AM    ASTSGOT 38 01/26/2018 06:47 AM    ALTSGPT 64 (H) 01/26/2018 06:47 AM    ALKPHOSPHAT 43 01/26/2018 06:47 AM     TBILIRUBIN 0.5 01/26/2018 06:47 AM    ALBUMIN 4.24 04/09/2018 08:59 AM    TOTPROTEIN 7.80 04/09/2018 08:59 AM     Lab Results   Component Value Date/Time    RHEUMFACTN <10 08/12/2017 08:35 AM    CCPANTIBODY 17 08/12/2017 08:35 AM    ANTINUCAB Detected (A) 04/09/2018 08:59 AM     Lab Results   Component Value Date/Time    SEDRATEWES 39 (H) 01/16/2018 08:53 AM    CREACTPROT 0.89 (H) 01/16/2018 08:53 AM     No results found for: DELMI ROSEVTINTERP  Lab Results   Component Value Date/Time    X7VMAYPTJFX 174.0 07/10/2017 09:15 AM    B9YBYWUWDCZ 33.0 07/10/2017 09:15 AM     Lab Results   Component Value Date/Time    ANTIDNADS None Detected 07/10/2017 09:15 AM    RNPAB 0 06/13/2017 11:28 AM    SMITHAB 0 07/10/2017 09:15 AM    HIVPEAQ45 0 08/12/2017 08:35 AM    CENTROMBAB 0 08/12/2017 08:35 AM     Lab Results   Component Value Date/Time    ANTIDNADS None Detected 07/10/2017 09:15 AM    Q2YPLAXIJRJ 174.0 07/10/2017 09:15 AM    JO1AB 0 08/12/2017 08:35 AM    RNPAB 0 06/13/2017 11:28 AM    ANTISSBSJ 0 06/13/2017 11:28 AM     Lab Results   Component Value Date/Time    COLORURINE Yellow 01/11/2018 09:01 AM    SPECGRAVITY 1.021 01/11/2018 09:01 AM    PHURINE 5.5 01/11/2018 09:01 AM    GLUCOSEUR Negative 01/11/2018 09:01 AM    KETONES Negative 01/11/2018 09:01 AM    PROTEINURIN Negative 01/11/2018 09:01 AM     Lab Results   Component Value Date/Time    TOTPROTUR 22.6 (H) 04/06/2015 07:04 AM     Lab Results   Component Value Date/Time    SSA60 1 07/10/2017 09:15 AM    SSA52 4 07/10/2017 09:15 AM     Lab Results   Component Value Date/Time    HBA1C 6.2 (H) 01/26/2018 06:47 AM     No results found for: CPKTOTAL  No results found for: G6PD  Lab Results   Component Value Date/Time    YEPG54YUEL Negative 08/12/2017 08:36 AM     No results found for: ACESERUM  Lab Results   Component Value Date/Time    25HYDROXY 28 (L) 01/26/2018 06:47 AM     Lab Results   Component Value Date/Time    TSH 0.763 03/09/2010 07:15 AM    FREEDIR 1.01  03/09/2010 07:15 AM     Lab Results   Component Value Date/Time    TSHULTRASEN 1.820 06/30/2016 09:30 AM    FREET4 0.79 04/06/2015 07:03 AM     Lab Results   Component Value Date/Time    MICROSOMALA 0.6 08/12/2017 08:35 AM    ANTITHYROGL <0.9 08/12/2017 08:35 AM     No results found for: IGGLYMEABS  Lab Results   Component Value Date/Time    ANTIMITOCHO 25.9 (H) 10/02/2017 08:55 AM    FACTIN 16 10/02/2017 08:55 AM     Lab Results   Component Value Date/Time     10/02/2017 08:55 AM    TTRANSIGA 8 10/01/2012 11:58 AM     No results found for: FLTYPE, CRYSTALSBDF  No results found for: ISTATICAL  No results found for: ISTATCREAT  No results found for: CTELOPEP  No results found for: GBMABG  No results found for: PTHINTACT      Medical Decision Making:  Today's Assessment / Status / Plan:     Positive SUMMER  Additional work-up was unrevealing.  Anti histone was negative.  She is off doxycyline. Symptoms are improving.  We will monitor.      Raised ESR  CXR did not show hilar adenopathy  SPEP normal  UA is abnormal.  Did not repeat UA yet.  Will repeat UA and if abnormal consider checking anti GBM, ANCA and referral to nephrology  Will also recheck ESR and CRP    Elevated ALT  Anti smooth muscle antibody is elevated at 25.9  With intermittent ALT elevation  GI suspects this is more Fatty liver.    1. Positive SUMMER (antinuclear antibody)  CBC WITH DIFFERENTIAL    CRP QUANTITIVE (NON-CARDIAC)    WESTERGREN SED RATE    COMP METABOLIC PANEL    SUMMER TITER    ANTI-DNA (DS)   2. Arthralgia, unspecified joint  CBC WITH DIFFERENTIAL    CRP QUANTITIVE (NON-CARDIAC)    WESTERGREN SED RATE    COMP METABOLIC PANEL    SUMMER TITER    ANTI-DNA (DS)   3. Transaminitis  CBC WITH DIFFERENTIAL    CRP QUANTITIVE (NON-CARDIAC)    WESTERGREN SED RATE    COMP METABOLIC PANEL    SUMMER TITER    ANTI-DNA (DS)     Return in about 4 months (around 8/11/2018).        She agreed and verbalized her agreement and understanding with the current plan. I  answered all questions and concerns she has at this time and advised her to call at any time in there interim with questions or concerns in regards to her care.    Thank you for allowing me to participate in her care, I will continue to follow closely.

## 2018-04-11 NOTE — LETTER
[facility]   [address], [address]  [place] [address]  Phone: [phone]  Fax: [phone]              Encounter Date: 4/11/2018    Dear Dr. Campbell ref. provider found,    It was a pleasure seeing your patient, Radha Marlow, on 4/11/2018. Diagnoses of Positive SUMMER (antinuclear antibody), Arthralgia, unspecified joint, and Transaminitis were pertinent to this visit.     Please find attached progress note which includes the history I obtained from Ms. Marlow, my physical examination findings, my impression and recommendations.      Once again, it was a pleasure participating in your patient's care.  Please feel free to contact me if you have any questions or if I can be of any further assistance to your patients.      Sincerely,    Lulu Wilburn M.D.  Electronically Signed          PROGRESS NOTE:  Subjective:   Date of Consultation:4/11/2018  9:49 AM  Primary care physician:SAMY Roach      Reason for Consultation:    Ms. Marlow  is a pleasant 46 y.o. year old female who presented with follow-up for positive SUMMER    Positive SUMMER  Energy level is improved.  Taking vitamin D.  Hair loss may have stabilized.  She has mild stiffness in the morning.  Right now has plantar fasciitis and doing exercises.      Weight loss  With 17 intentional weight loss  With weight watchers  Recognize she could hike 17 miles with minimal shortness of breath.    Raised ESR  She denies recent infection    Elevated ALT with elevated AMA antibody  At last visit we referred her to GI for evaluation for autoimmune hepatitis.  She has not seen GI.  Will see GI this Friday.  Historically it has been intermittently elevated  She does have an anti mitochondrial antibody that is equivocal.  Previously she was diagnosed with fatty liver with grade 2 chronic portal inflammation and some lobular inflammation  She did have an ultrasound in 8/2016 and noted fatty liver.      Her RUQ pain is improved and has  dissipated a lot  ___________________________  Biopsy showed mainly fatty liver. 40% but had minor inflammation concern    Abnormal UA  Noted on recent labs  Has not repeated them    Rash/Rosacea  She is now off doxycycline.  Her Rosacea is not acting up.  Her joint pain are the same with no change.      Diabetes  (not addressed)      RHEUM HISTORY:  I first met Ms. Radha Jaramillo with a history of positive SUMMER on 2017.  She was noted to have a persistently elevated ESR.  She otherwise denied prolonged morning stiffness, photosensitivity, sicca symptoms, or Raynauds or eye inflammation.  She did have a mild rash on the anterior chest.    Pertinent Serologies  Results for RADHA JARAMILLO (MRN [id number]) as of 2017 15:20    Ref. Range 2017 11:28 7/10/2017 09:15   C3 Complement Latest Ref Range: 87.0-200.0 mg/dL   174.0   Complement C4 Latest Ref Range: 19.0-52.0 mg/dL   33.0   Complement Total Hemolytic Latest Ref Range:   Units   185 (H)   Stat C-Reactive Protein Latest Ref Range: 0.00-0.75 mg/dL 0.89 (H)     Anti-Dna -Ds Latest Ref Range: None Detected  None Detected None Detected   Antinuclear Antibody Latest Ref Range: None Detected  Detected (A)     Rnp Antibodies Latest Ref Range: 0-40 AU/mL 0     Smith Antibodies Latest Ref Range: 0-40 AU/mL 0 0   SSA 52 (R0)(ERVIN) Ab, IgG Latest Ref Range: 0-40 AU/mL 8 4   SSA 60 (R0)(ERVIN) Ab, IgG Latest Ref Range: 0-40 AU/mL 4 1   Sjogren'S Anti-Ss-B Latest Ref Range: 0-40 AU/mL 0     SUMMER Titer Latest Ref Range: <1:40  1:5120 (H)        Results for RADHA JARAMILLO (MRN [id number]) as of 2017 15:25    Ref. Range 10/1/2012 11:58 2015 07:03   Microsomal -Tpo- Abs Latest Ref Range: 0.0-9.0 IU/mL 0.4 0.4   Anti-Thyroglobulin Latest Ref Range: 0.0-4.0 IU/mL <0.9 <0.9   Gliadin Antibodies Iga Latest Ref Range: 0-19 Units 9        Results for RADHA JARAMILLO (MRN [id number]) as of 2017 19:07   Ref. Range 2017 08:36   HLA-B27 Screen  Latest Ref Range: Negative  Negative     Results for AUBREY JARAMILLO (MRN [id number]) as of 8/28/2017 19:07   Ref. Range 8/12/2017 08:35   Rheumatoid Factor -Neph- Latest Ref Range: 0 - 14 IU/mL <10       Results for AUBREY JARAMILLO (MRN [id number]) as of 8/28/2017 19:07   Ref. Range 8/12/2017 08:35   Anti-Mitochondrial Ab Latest Ref Range: 0.0 - 20.0 Units 23.8 (H)   Microsomal -Tpo- Abs Latest Ref Range: 0.0 - 9.0 IU/mL 0.6   Anti-Thyroglobulin Latest Ref Range: 0.0 - 4.0 IU/mL <0.9   F-Actin Ab, IgG Latest Ref Range: 0 - 19 Units 18   Anti-Scl-70 Latest Ref Range: 0 - 40 AU/mL 0   Susanne-1 Antibody, IgG Latest Ref Range: 0 - 40 AU/mL 0   Anti-Centromere B Ab Latest Ref Range: 0 - 40 AU/mL 0   Ccp Antibodies Latest Ref Range: 0 - 19 Units 17   Chromatin Ab, IgG Latest Ref Range: 0 - 19 Units 3     Results for AUBREY JARAMILLO (MRN [id number]) as of 10/17/2017 15:55   Ref. Range 10/2/2017 08:55   Anti-Mitochondrial Ab Latest Ref Range: 0.0 - 20.0 Units 25.9 (H)   F-Actin Ab, IgG Latest Ref Range: 0 - 19 Units 16   Anti-Histone Antibodies Latest Ref Range: 0.0 - 0.9 Units 0.5           Past Medical HIstory: DM, infertility (at age 16 she experienced amenorrhea and diagnosed with PCOS managed with birth control), no miscarriages, gestational diabetes with 3 kids, premature births (@ 33 weeks, @ 37 weeks, @ 38 weeks).        Social History: not a current smoker, no alcohol, school as a  graduated 12/2017 [employer]     Family History: son has very dry eyes ad has chronic hives, heart stents, DM,    Past Medical History:   Diagnosis Date   • Allergy    • Anemia    • Arthritis    • Hyperlipidemia    • Hypertriglyceridemia 3/17/2010     No past surgical history on file.  Allergies   Allergen Reactions   • Bactrim [Sulfamethoxazole W-Trimethoprim] Unspecified     Rash    • No Known Drug Allergy      Outpatient Encounter Prescriptions as of 4/11/2018   Medication Sig Dispense Refill   • DOXYCYCLINE PO  Take  by mouth.     • atorvastatin (LIPITOR) 20 MG Tab TAKE 1 TAB BY MOUTH EVERY DAY. 90 Tab 0   • metformin ER (GLUCOPHAGE XR) 500 MG TABLET SR 24 HR TAKE 2 TABLETS BY MOUTH 2 TIMES A DAY. 360 Tab 0   • B Complex Vitamins (VITAMIN B COMPLEX PO) Take  by mouth.     • ibuprofen (MOTRIN) 200 MG Tab Take 200 mg by mouth every 6 hours as needed.     • metronidazole (METROCREAM) 0.75 % cream Apply  to affected area(s) 2 times a day.     • ONE TOUCH ULTRA TEST strip USE 4 TIMES A DAY AND AS NEEDED FOR HIGH OR LOW BLOOD SUGAR  3   • BLISOVI FE 1/20 1-20 MG-MCG per tablet 1 Tab every day.  3   • Cholecalciferol (VITAMIN D) 2000 UNITS Cap Take  by mouth.     • Blood Glucose Monitoring Suppl Device Meter: Dispense insurance specific meter. Sig. Use as directed for blood sugar monitoring. #1. NR. DX: E11.9 1 Device 0   • Lancets Misc Lancets order: Lancets for insurance specific meter. Sig: use qid and prn ssx high or low sugar. #100 RF x 3 DX: E11.9 100 Each 3   • Blood Glucose Monitoring Suppl SUPPLIES Misc Test strips order: Test strips for insurance specific meter. Sig: use qid and prn ssx high or low sugar #100 RF x 3. DX: E11.9 100 Each 3   • Multiple Vitamin (DAILY VITAMINS PO) Take  by mouth.     • fluticasone (FLONASE) 50 MCG/ACT nasal spray Spray 1 Spray in nose 2 times a day. Each Nostril 1 Bottle 3     No facility-administered encounter medications on file as of 4/11/2018.        Social History     Social History   • Marital status:      Spouse name: N/A   • Number of children: 4   • Years of education: N/A     Occupational History   • student early childhood education           Social History Main Topics   • Smoking status: Former Smoker     Packs/day: 0.25     Years: 1.00     Types: Cigarettes     Quit date: 1/1/1991   • Smokeless tobacco: Never Used   • Alcohol use No      Comment: [employer] prohibits   • Drug use: No   • Sexual activity: Yes     Partners: Male     Birth control/  protection: Surgical     Other Topics Concern   • Not on file     Social History Narrative   • No narrative on file      History   Smoking Status   • Former Smoker   • Packs/day: 0.25   • Years: 1.00   • Types: Cigarettes   • Quit date: 1991   Smokeless Tobacco   • Never Used     History   Alcohol Use No     Comment: [employer] prohibits     History   Drug Use No      OB History    Para Term  AB Living   4 4 1 3       SAB TAB Ectopic Molar Multiple Live Births                    # Outcome Date GA Lbr Nixon/2nd Weight Sex Delivery Anes PTL Lv   4             3             2             1 Term                  No LMP recorded.    G P A L     Family History   Problem Relation Age of Onset   • Diabetes Mother    • Heart Disease Father    • Hypertension Father    • Hyperlipidemia Father    • Diabetes Brother    • Heart Disease Brother    • Hypertension Brother    • Hyperlipidemia Brother    • Diabetes Maternal Aunt    • Diabetes Maternal Uncle    • Cancer Maternal Grandmother      Cervical/ovarian   • Diabetes Maternal Grandfather    • Heart Disease Maternal Grandfather    • Hypertension Maternal Grandfather    • Hyperlipidemia Maternal Grandfather    • Stroke Maternal Grandfather    • Stroke Paternal Grandmother    • Hyperlipidemia Paternal Grandmother    • Hypertension Paternal Grandmother    • Heart Disease Paternal Grandmother    • Heart Disease Paternal Grandfather    • Hypertension Paternal Grandfather    • Hyperlipidemia Paternal Grandfather        Review of Systems   Constitutional: Negative for chills and fever.   Cardiovascular: Negative for chest pain.   Musculoskeletal: Positive for joint pain.   Skin: Negative for rash.        Objective:   /80   Pulse 79   Temp 36.7 °C (98 °F)   Resp 14   Wt 102.6 kg (226 lb 3.2 oz)   SpO2 94%   BMI 38.83 kg/m²      Physical Exam   Constitutional: She is oriented to person, place, and time. She appears well-developed and  well-nourished. No distress.   HENT:   Head: Normocephalic and atraumatic.   Right Ear: External ear normal.   Left Ear: External ear normal.   Eyes: Conjunctivae are normal. Right eye exhibits no discharge. Left eye exhibits no discharge. No scleral icterus.   Pulmonary/Chest: Effort normal. No stridor. No respiratory distress.   Abdominal: Soft. She exhibits no distension. There is no tenderness.   No hepatomegaly   Musculoskeletal: She exhibits no edema.   No synovitis appreciated in the upper extremities   Neurological: She is alert and oriented to person, place, and time.   Skin: Skin is warm and dry. No rash noted. She is not diaphoretic. No erythema.   Limited skin exam.  No overt malar rash   Psychiatric: She has a normal mood and affect. Her behavior is normal. Judgment and thought content normal.   Vitals reviewed.      Assessment:     1. Positive SUMMER (antinuclear antibody)  CBC WITH DIFFERENTIAL    CRP QUANTITIVE (NON-CARDIAC)    WESTERGREN SED RATE    COMP METABOLIC PANEL    SUMMER TITER    ANTI-DNA (DS)   2. Arthralgia, unspecified joint  CBC WITH DIFFERENTIAL    CRP QUANTITIVE (NON-CARDIAC)    WESTERGREN SED RATE    COMP METABOLIC PANEL    SUMMER TITER    ANTI-DNA (DS)   3. Transaminitis  CBC WITH DIFFERENTIAL    CRP QUANTITIVE (NON-CARDIAC)    WESTERGREN SED RATE    COMP METABOLIC PANEL    SUMMER TITER    ANTI-DNA (DS)     Labs:  [unfilled]    No results found for: QNTTBGOLD  Lab Results   Component Value Date/Time    HEPBSAG Negative 01/16/2018 09:12 AM     Lab Results   Component Value Date/Time    HEPCAB Negative 01/16/2018 09:12 AM     Lab Results   Component Value Date/Time    SODIUM 135 01/26/2018 06:47 AM    POTASSIUM 3.9 01/26/2018 06:47 AM    CHLORIDE 105 01/26/2018 06:47 AM    CO2 22 01/26/2018 06:47 AM    GLUCOSE 138 (H) 01/26/2018 06:47 AM    BUN 17 01/26/2018 06:47 AM    CREATININE 0.65 01/26/2018 06:47 AM    CREATININE 0.82 04/11/2016    BUNCREATRAT 23 03/09/2010 07:15 AM    GLOMRATE >59  03/09/2010 07:15 AM      Lab Results   Component Value Date/Time    WBC 7.1 01/26/2018 06:46 AM    WBC 6.9 03/09/2010 07:15 AM    RBC 4.34 01/26/2018 06:46 AM    RBC 4.50 03/09/2010 07:15 AM    HEMOGLOBIN 13.2 01/26/2018 06:46 AM    HEMATOCRIT 40.7 01/26/2018 06:46 AM    MCV 93.8 01/26/2018 06:46 AM    MCV 93 03/09/2010 07:15 AM    MCH 30.4 01/26/2018 06:46 AM    MCH 31.3 03/09/2010 07:15 AM    MCHC 32.4 (L) 01/26/2018 06:46 AM    MPV 10.5 01/26/2018 06:46 AM    NEUTSPOLYS 60.10 01/26/2018 06:46 AM    LYMPHOCYTES 28.10 01/26/2018 06:46 AM    MONOCYTES 8.80 01/26/2018 06:46 AM    EOSINOPHILS 1.70 01/26/2018 06:46 AM    BASOPHILS 0.70 01/26/2018 06:46 AM      Lab Results   Component Value Date/Time    CALCIUM 9.0 01/26/2018 06:47 AM    ASTSGOT 38 01/26/2018 06:47 AM    ALTSGPT 64 (H) 01/26/2018 06:47 AM    ALKPHOSPHAT 43 01/26/2018 06:47 AM    TBILIRUBIN 0.5 01/26/2018 06:47 AM    ALBUMIN 4.24 04/09/2018 08:59 AM    TOTPROTEIN 7.80 04/09/2018 08:59 AM     Lab Results   Component Value Date/Time    RHEUMFACTN <10 08/12/2017 08:35 AM    CCPANTIBODY 17 08/12/2017 08:35 AM    ANTINUCAB Detected (A) 04/09/2018 08:59 AM     Lab Results   Component Value Date/Time    SEDRATEWES 39 (H) 01/16/2018 08:53 AM    CREACTPROT 0.89 (H) 01/16/2018 08:53 AM     No results found for: MILTON, VVTINTERP  Lab Results   Component Value Date/Time    I0XXLJJFVAS 174.0 07/10/2017 09:15 AM    A4OCUJYQXCB 33.0 07/10/2017 09:15 AM     Lab Results   Component Value Date/Time    ANTIDNADS None Detected 07/10/2017 09:15 AM    RNPAB 0 06/13/2017 11:28 AM    SMITHAB 0 07/10/2017 09:15 AM    GMBNADB23 0 08/12/2017 08:35 AM    CENTROMBAB 0 08/12/2017 08:35 AM     Lab Results   Component Value Date/Time    ANTIDNADS None Detected 07/10/2017 09:15 AM    J6VCQOHOOME 174.0 07/10/2017 09:15 AM    JO1AB 0 08/12/2017 08:35 AM    RNPAB 0 06/13/2017 11:28 AM    ANTISSBSJ 0 06/13/2017 11:28 AM     Lab Results   Component Value Date/Time    COLORURINE  Yellow 01/11/2018 09:01 AM    SPECGRAVITY 1.021 01/11/2018 09:01 AM    PHURINE 5.5 01/11/2018 09:01 AM    GLUCOSEUR Negative 01/11/2018 09:01 AM    KETONES Negative 01/11/2018 09:01 AM    PROTEINURIN Negative 01/11/2018 09:01 AM     Lab Results   Component Value Date/Time    TOTPROTUR 22.6 (H) 04/06/2015 07:04 AM     Lab Results   Component Value Date/Time    SSA60 1 07/10/2017 09:15 AM    SSA52 4 07/10/2017 09:15 AM     Lab Results   Component Value Date/Time    HBA1C 6.2 (H) 01/26/2018 06:47 AM     No results found for: CPKTOTAL  No results found for: G6PD  Lab Results   Component Value Date/Time    TNYL51PSLT Negative 08/12/2017 08:36 AM     No results found for: ACESERUM  Lab Results   Component Value Date/Time    25HYDROXY 28 (L) 01/26/2018 06:47 AM     Lab Results   Component Value Date/Time    TSH 0.763 03/09/2010 07:15 AM    FREEDIR 1.01 03/09/2010 07:15 AM     Lab Results   Component Value Date/Time    TSHULTRASEN 1.820 06/30/2016 09:30 AM    FREET4 0.79 04/06/2015 07:03 AM     Lab Results   Component Value Date/Time    MICROSOMALA 0.6 08/12/2017 08:35 AM    ANTITHYROGL <0.9 08/12/2017 08:35 AM     No results found for: IGGLYMEABS  Lab Results   Component Value Date/Time    ANTIMITOCHO 25.9 (H) 10/02/2017 08:55 AM    FACTIN 16 10/02/2017 08:55 AM     Lab Results   Component Value Date/Time     10/02/2017 08:55 AM    TTRANSIGA 8 10/01/2012 11:58 AM     No results found for: FLTYPE, CRYSTALSBDF  No results found for: ISTATICAL  No results found for: ISTATCREAT  No results found for: CTELOPEP  No results found for: GBMABG  No results found for: PTHINTACT      Medical Decision Making:  Today's Assessment / Status / Plan:     Positive SUMMER  Additional work-up was unrevealing.  Anti histone was negative.  She is off doxycyline. Symptoms are improving.  We will monitor.      Raised ESR  CXR did not show hilar adenopathy  SPEP normal  UA is abnormal.  Did not repeat UA yet.  Will repeat UA and if abnormal  consider checking anti GBM, ANCA and referral to nephrology  Will also recheck ESR and CRP    Elevated ALT  Anti smooth muscle antibody is elevated at 25.9  With intermittent ALT elevation  GI suspects this is more Fatty liver.    1. Positive SUMMER (antinuclear antibody)  CBC WITH DIFFERENTIAL    CRP QUANTITIVE (NON-CARDIAC)    WESTERGREN SED RATE    COMP METABOLIC PANEL    SUMMER TITER    ANTI-DNA (DS)   2. Arthralgia, unspecified joint  CBC WITH DIFFERENTIAL    CRP QUANTITIVE (NON-CARDIAC)    WESTERGREN SED RATE    COMP METABOLIC PANEL    SUMMER TITER    ANTI-DNA (DS)   3. Transaminitis  CBC WITH DIFFERENTIAL    CRP QUANTITIVE (NON-CARDIAC)    WESTERGREN SED RATE    COMP METABOLIC PANEL    SMUMER TITER    ANTI-DNA (DS)     Return in about 4 months (around 8/11/2018).        She agreed and verbalized her agreement and understanding with the current plan. I answered all questions and concerns she has at this time and advised her to call at any time in there interim with questions or concerns in regards to her care.    Thank you for allowing me to participate in her care, I will continue to follow closely.

## 2018-05-16 RX ORDER — METFORMIN HYDROCHLORIDE 500 MG/1
TABLET, EXTENDED RELEASE ORAL
Qty: 360 TAB | Refills: 2 | Status: SHIPPED | OUTPATIENT
Start: 2018-05-16 | End: 2019-02-22 | Stop reason: SDUPTHER

## 2018-05-16 NOTE — TELEPHONE ENCOUNTER
Was the patient seen in the last year in this department? Yes     Does patient have an active prescription for medications requested? No     Received Request Via: Pharmacy      Pt met protocol?: Yes, OV 3/18   Lab Results   Component Value Date/Time    HBA1C 6.2 (H) 01/26/2018 06:47 AM

## 2018-05-16 NOTE — TELEPHONE ENCOUNTER
Patient has recently been seen by PCP within the last 6 months per protocol (3/18). Will refill medications for 6 months.  Lab Results   Component Value Date/Time    HBA1C 6.2 (H) 01/26/2018 06:47 AM      Lab Results   Component Value Date/Time    MALBCRT 5 01/26/2018 06:47 AM    MICROALBUR 1.4 01/26/2018 06:47 AM      Lab Results   Component Value Date/Time    ALKPHOSPHAT 43 01/26/2018 06:47 AM    ASTSGOT 38 01/26/2018 06:47 AM    ALTSGPT 64 (H) 01/26/2018 06:47 AM    TBILIRUBIN 0.5 01/26/2018 06:47 AM

## 2018-07-16 ENCOUNTER — HOSPITAL ENCOUNTER (OUTPATIENT)
Dept: LAB | Facility: MEDICAL CENTER | Age: 48
End: 2018-07-16
Attending: INTERNAL MEDICINE
Payer: COMMERCIAL

## 2018-07-16 DIAGNOSIS — M25.50 ARTHRALGIA, UNSPECIFIED JOINT: ICD-10-CM

## 2018-07-16 DIAGNOSIS — R76.8 POSITIVE ANA (ANTINUCLEAR ANTIBODY): ICD-10-CM

## 2018-07-16 DIAGNOSIS — R74.01 TRANSAMINITIS: ICD-10-CM

## 2018-07-16 LAB
ALBUMIN SERPL BCP-MCNC: 4 G/DL (ref 3.2–4.9)
ALBUMIN SERPL BCP-MCNC: 4 G/DL (ref 3.2–4.9)
ALBUMIN/GLOB SERPL: 1.2 G/DL
ALP SERPL-CCNC: 50 U/L (ref 30–99)
ALP SERPL-CCNC: 51 U/L (ref 30–99)
ALT SERPL-CCNC: 14 U/L (ref 2–50)
ALT SERPL-CCNC: 15 U/L (ref 2–50)
ANION GAP SERPL CALC-SCNC: 9 MMOL/L (ref 0–11.9)
AST SERPL-CCNC: 14 U/L (ref 12–45)
AST SERPL-CCNC: 14 U/L (ref 12–45)
BASOPHILS # BLD AUTO: 0.3 % (ref 0–1.8)
BASOPHILS # BLD: 0.02 K/UL (ref 0–0.12)
BILIRUB CONJ SERPL-MCNC: <0.1 MG/DL (ref 0.1–0.5)
BILIRUB INDIRECT SERPL-MCNC: NORMAL MG/DL (ref 0–1)
BILIRUB SERPL-MCNC: 0.6 MG/DL (ref 0.1–1.5)
BILIRUB SERPL-MCNC: 0.6 MG/DL (ref 0.1–1.5)
BUN SERPL-MCNC: 16 MG/DL (ref 8–22)
CALCIUM SERPL-MCNC: 9.3 MG/DL (ref 8.5–10.5)
CHLORIDE SERPL-SCNC: 106 MMOL/L (ref 96–112)
CO2 SERPL-SCNC: 23 MMOL/L (ref 20–33)
CREAT SERPL-MCNC: 0.73 MG/DL (ref 0.5–1.4)
CRP SERPL HS-MCNC: 0.96 MG/DL (ref 0–0.75)
EOSINOPHIL # BLD AUTO: 0.08 K/UL (ref 0–0.51)
EOSINOPHIL NFR BLD: 1.4 % (ref 0–6.9)
ERYTHROCYTE [DISTWIDTH] IN BLOOD BY AUTOMATED COUNT: 41.4 FL (ref 35.9–50)
ERYTHROCYTE [SEDIMENTATION RATE] IN BLOOD BY WESTERGREN METHOD: 50 MM/HOUR (ref 0–20)
GLOBULIN SER CALC-MCNC: 3.3 G/DL (ref 1.9–3.5)
GLUCOSE SERPL-MCNC: 101 MG/DL (ref 65–99)
HCT VFR BLD AUTO: 37.5 % (ref 37–47)
HGB BLD-MCNC: 13.1 G/DL (ref 12–16)
IMM GRANULOCYTES # BLD AUTO: 0.02 K/UL (ref 0–0.11)
IMM GRANULOCYTES NFR BLD AUTO: 0.3 % (ref 0–0.9)
LYMPHOCYTES # BLD AUTO: 1.47 K/UL (ref 1–4.8)
LYMPHOCYTES NFR BLD: 25.1 % (ref 22–41)
MCH RBC QN AUTO: 31.9 PG (ref 27–33)
MCHC RBC AUTO-ENTMCNC: 34.9 G/DL (ref 33.6–35)
MCV RBC AUTO: 91.2 FL (ref 81.4–97.8)
MONOCYTES # BLD AUTO: 0.45 K/UL (ref 0–0.85)
MONOCYTES NFR BLD AUTO: 7.7 % (ref 0–13.4)
NEUTROPHILS # BLD AUTO: 3.82 K/UL (ref 2–7.15)
NEUTROPHILS NFR BLD: 65.2 % (ref 44–72)
NRBC # BLD AUTO: 0 K/UL
NRBC BLD-RTO: 0 /100 WBC
PLATELET # BLD AUTO: 215 K/UL (ref 164–446)
PMV BLD AUTO: 10.9 FL (ref 9–12.9)
POTASSIUM SERPL-SCNC: 4.1 MMOL/L (ref 3.6–5.5)
PROT SERPL-MCNC: 7.2 G/DL (ref 6–8.2)
PROT SERPL-MCNC: 7.3 G/DL (ref 6–8.2)
RBC # BLD AUTO: 4.11 M/UL (ref 4.2–5.4)
SODIUM SERPL-SCNC: 138 MMOL/L (ref 135–145)
WBC # BLD AUTO: 5.9 K/UL (ref 4.8–10.8)

## 2018-07-16 PROCEDURE — 85025 COMPLETE CBC W/AUTO DIFF WBC: CPT

## 2018-07-16 PROCEDURE — 86140 C-REACTIVE PROTEIN: CPT

## 2018-07-16 PROCEDURE — 36415 COLL VENOUS BLD VENIPUNCTURE: CPT

## 2018-07-16 PROCEDURE — 85652 RBC SED RATE AUTOMATED: CPT

## 2018-07-16 PROCEDURE — 86235 NUCLEAR ANTIGEN ANTIBODY: CPT | Mod: 91

## 2018-07-16 PROCEDURE — 86225 DNA ANTIBODY NATIVE: CPT | Mod: 91

## 2018-07-16 PROCEDURE — 80053 COMPREHEN METABOLIC PANEL: CPT

## 2018-07-16 PROCEDURE — 80076 HEPATIC FUNCTION PANEL: CPT

## 2018-07-16 PROCEDURE — 86038 ANTINUCLEAR ANTIBODIES: CPT

## 2018-07-17 ENCOUNTER — HOSPITAL ENCOUNTER (OUTPATIENT)
Dept: RADIOLOGY | Facility: MEDICAL CENTER | Age: 48
End: 2018-07-17
Attending: SPECIALIST
Payer: COMMERCIAL

## 2018-07-17 DIAGNOSIS — Z12.31 VISIT FOR SCREENING MAMMOGRAM: ICD-10-CM

## 2018-07-17 LAB — DSDNA AB TITR SER CLIF: NORMAL {TITER}

## 2018-07-17 PROCEDURE — 77067 SCR MAMMO BI INCL CAD: CPT

## 2018-07-19 LAB
ENA SM IGG SER-ACNC: 0 AU/ML (ref 0–40)
ENA SS-B IGG SER IA-ACNC: 0 AU/ML (ref 0–40)
NUCLEAR IGG SER QL IA: DETECTED
NUCLEAR IGG TITR SER IF: ABNORMAL {TITER}
SSA52 R0ENA AB IGG Q0420: 7 AU/ML (ref 0–40)
SSA60 R0ENA AB IGG Q0419: 0 AU/ML (ref 0–40)
U1 SNRNP IGG SER QL: 0 AU/ML (ref 0–40)

## 2018-08-14 ENCOUNTER — APPOINTMENT (OUTPATIENT)
Dept: RHEUMATOLOGY | Facility: PHYSICIAN GROUP | Age: 48
End: 2018-08-14
Payer: COMMERCIAL

## 2018-09-12 ENCOUNTER — OFFICE VISIT (OUTPATIENT)
Dept: RHEUMATOLOGY | Facility: PHYSICIAN GROUP | Age: 48
End: 2018-09-12
Payer: COMMERCIAL

## 2018-09-12 ENCOUNTER — HOSPITAL ENCOUNTER (OUTPATIENT)
Dept: LAB | Facility: MEDICAL CENTER | Age: 48
End: 2018-09-12
Attending: INTERNAL MEDICINE
Payer: COMMERCIAL

## 2018-09-12 VITALS
TEMPERATURE: 97.4 F | SYSTOLIC BLOOD PRESSURE: 112 MMHG | RESPIRATION RATE: 16 BRPM | BODY MASS INDEX: 35.87 KG/M2 | DIASTOLIC BLOOD PRESSURE: 70 MMHG | WEIGHT: 209 LBS | OXYGEN SATURATION: 97 % | HEART RATE: 70 BPM

## 2018-09-12 DIAGNOSIS — R70.0 ESR RAISED: ICD-10-CM

## 2018-09-12 DIAGNOSIS — R30.0 DYSURIA: ICD-10-CM

## 2018-09-12 DIAGNOSIS — M54.5 CHRONIC LOW BACK PAIN, UNSPECIFIED BACK PAIN LATERALITY, WITH SCIATICA PRESENCE UNSPECIFIED: ICD-10-CM

## 2018-09-12 DIAGNOSIS — R76.8 POSITIVE ANA (ANTINUCLEAR ANTIBODY): ICD-10-CM

## 2018-09-12 DIAGNOSIS — G89.29 CHRONIC LOW BACK PAIN, UNSPECIFIED BACK PAIN LATERALITY, WITH SCIATICA PRESENCE UNSPECIFIED: ICD-10-CM

## 2018-09-12 LAB
APPEARANCE UR: ABNORMAL
BACTERIA #/AREA URNS HPF: ABNORMAL /HPF
BILIRUB UR QL STRIP.AUTO: NEGATIVE
COLOR UR: YELLOW
EPI CELLS #/AREA URNS HPF: ABNORMAL /HPF
GLUCOSE UR STRIP.AUTO-MCNC: NEGATIVE MG/DL
KETONES UR STRIP.AUTO-MCNC: NEGATIVE MG/DL
LEUKOCYTE ESTERASE UR QL STRIP.AUTO: ABNORMAL
MICRO URNS: ABNORMAL
MUCOUS THREADS #/AREA URNS HPF: ABNORMAL /HPF
NITRITE UR QL STRIP.AUTO: NEGATIVE
PH UR STRIP.AUTO: 5.5 [PH]
PROT UR QL STRIP: NEGATIVE MG/DL
RBC UR QL AUTO: NEGATIVE
SP GR UR STRIP.AUTO: 1.02
UROBILINOGEN UR STRIP.AUTO-MCNC: 0.2 MG/DL
WBC #/AREA URNS HPF: ABNORMAL /HPF

## 2018-09-12 PROCEDURE — 81001 URINALYSIS AUTO W/SCOPE: CPT

## 2018-09-12 PROCEDURE — 99214 OFFICE O/P EST MOD 30 MIN: CPT | Performed by: INTERNAL MEDICINE

## 2018-09-12 PROCEDURE — 87086 URINE CULTURE/COLONY COUNT: CPT

## 2018-09-12 ASSESSMENT — ENCOUNTER SYMPTOMS
CHILLS: 0
ROS GI COMMENTS: LOWER ABDOMINAL CRAMPING
FEVER: 0
BACK PAIN: 1

## 2018-09-12 NOTE — PROGRESS NOTES
Subjective:   Date of Consultation:2018  4:14 PM  Primary care physician:SAMY Roach      Reason for Consultation:    Ms. Jaramillo  is a pleasant 46 y.o. year old female who presented with follow-up for positive SUMMER    Positive SUMMER  Feels tired.  Notes her greatest problem is the increase back pain.  It is worse in the morning and then at night.  Some mornings she will stay in the shower for relief.    Weight loss  Continues to have weight loss    Raised ESR  She denies infection in July.  She notes today she feels she has a UTI - dysuria    Elevated ALT with elevated AMA antibody  Saw GI feels this is fatty liver  Historically it has been intermittently elevated  She does have an anti mitochondrial antibody that is equivocal.  Previously she was diagnosed with fatty liver with grade 2 chronic portal inflammation and some lobular inflammation  She did have an ultrasound in 2016 and noted fatty liver.      Her RUQ pain is improved and has dissipated a lot  ___________________________  Biopsy showed mainly fatty liver. 40% but had minor inflammation concern    Abnormal UA  Noted on recent labs  Has not repeated them    Rash/Rosacea  She is now off doxycycline.  Her Rosacea is not acting up.  Her joint pain are the same with no change.      Diabetes  (not addressed)      RHEUM HISTORY:  I first met Ms. Radha Jaramillo with a history of positive SUMMER on 2017.  She was noted to have a persistently elevated ESR.  She otherwise denied prolonged morning stiffness, photosensitivity, sicca symptoms, or Raynauds or eye inflammation.  She did have a mild rash on the anterior chest.    Pertinent Serologies  Results for RADHA JARAMILLO (MRN [id number]) as of 2017 15:20    Ref. Range 2017 11:28 7/10/2017 09:15   C3 Complement Latest Ref Range: 87.0-200.0 mg/dL   174.0   Complement C4 Latest Ref Range: 19.0-52.0 mg/dL   33.0   Complement Total Hemolytic Latest Ref Range:   Units   185  (H)   Stat C-Reactive Protein Latest Ref Range: 0.00-0.75 mg/dL 0.89 (H)     Anti-Dna -Ds Latest Ref Range: None Detected  None Detected None Detected   Antinuclear Antibody Latest Ref Range: None Detected  Detected (A)     Rnp Antibodies Latest Ref Range: 0-40 AU/mL 0     Smith Antibodies Latest Ref Range: 0-40 AU/mL 0 0   SSA 52 (R0)(ERVIN) Ab, IgG Latest Ref Range: 0-40 AU/mL 8 4   SSA 60 (R0)(ERVIN) Ab, IgG Latest Ref Range: 0-40 AU/mL 4 1   Sjogren'S Anti-Ss-B Latest Ref Range: 0-40 AU/mL 0     SUMMER Titer Latest Ref Range: <1:40  1:5120 (H)        Results for AUBREY JARAMILLO (MRN [id number]) as of 8/2/2017 15:25    Ref. Range 10/1/2012 11:58 4/6/2015 07:03   Microsomal -Tpo- Abs Latest Ref Range: 0.0-9.0 IU/mL 0.4 0.4   Anti-Thyroglobulin Latest Ref Range: 0.0-4.0 IU/mL <0.9 <0.9   Gliadin Antibodies Iga Latest Ref Range: 0-19 Units 9        Results for AUBREY JARAMILLO (MRN [id number]) as of 8/28/2017 19:07   Ref. Range 8/12/2017 08:36   HLA-B27 Screen Latest Ref Range: Negative  Negative     Results for AUBREY JARAMILLO (MRN [id number]) as of 8/28/2017 19:07   Ref. Range 8/12/2017 08:35   Rheumatoid Factor -Neph- Latest Ref Range: 0 - 14 IU/mL <10       Results for AUBREY JARAMILLO (MRN [id number]) as of 8/28/2017 19:07   Ref. Range 8/12/2017 08:35   Anti-Mitochondrial Ab Latest Ref Range: 0.0 - 20.0 Units 23.8 (H)   Microsomal -Tpo- Abs Latest Ref Range: 0.0 - 9.0 IU/mL 0.6   Anti-Thyroglobulin Latest Ref Range: 0.0 - 4.0 IU/mL <0.9   F-Actin Ab, IgG Latest Ref Range: 0 - 19 Units 18   Anti-Scl-70 Latest Ref Range: 0 - 40 AU/mL 0   Susanne-1 Antibody, IgG Latest Ref Range: 0 - 40 AU/mL 0   Anti-Centromere B Ab Latest Ref Range: 0 - 40 AU/mL 0   Ccp Antibodies Latest Ref Range: 0 - 19 Units 17   Chromatin Ab, IgG Latest Ref Range: 0 - 19 Units 3     Results for AUBREY JARAMILLO (MRN [id number]) as of 10/17/2017 15:55   Ref. Range 10/2/2017 08:55   Anti-Mitochondrial Ab Latest Ref Range: 0.0 - 20.0 Units 25.9 (H)    F-Actin Ab, IgG Latest Ref Range: 0 - 19 Units 16   Anti-Histone Antibodies Latest Ref Range: 0.0 - 0.9 Units 0.5           Past Medical HIstory: DM, infertility (at age 16 she experienced amenorrhea and diagnosed with PCOS managed with birth control), no miscarriages, gestational diabetes with 3 kids, premature births (@ 33 weeks, @ 37 weeks, @ 38 weeks).        Social History: not a current smoker, no alcohol, school as a  graduated 12/2017 [employer]     Family History: son has very dry eyes ad has chronic hives, heart stents, DM,    Past Medical History:   Diagnosis Date   • Allergy    • Anemia    • Arthritis    • Hyperlipidemia    • Hypertriglyceridemia 3/17/2010     No past surgical history on file.  Allergies   Allergen Reactions   • Bactrim [Sulfamethoxazole W-Trimethoprim] Unspecified     Rash    • No Known Drug Allergy      Outpatient Encounter Prescriptions as of 9/12/2018   Medication Sig Dispense Refill   • metFORMIN ER (GLUCOPHAGE XR) 500 MG TABLET SR 24 HR TAKE 2 TABLETS BY MOUTH 2 TIMES A DAY. 360 Tab 2   • DOXYCYCLINE PO Take  by mouth.     • atorvastatin (LIPITOR) 20 MG Tab TAKE 1 TAB BY MOUTH EVERY DAY. 90 Tab 0   • B Complex Vitamins (VITAMIN B COMPLEX PO) Take  by mouth.     • ibuprofen (MOTRIN) 200 MG Tab Take 200 mg by mouth every 6 hours as needed.     • metronidazole (METROCREAM) 0.75 % cream Apply  to affected area(s) 2 times a day.     • fluticasone (FLONASE) 50 MCG/ACT nasal spray Spray 1 Spray in nose 2 times a day. Each Nostril 1 Bottle 3   • ONE TOUCH ULTRA TEST strip USE 4 TIMES A DAY AND AS NEEDED FOR HIGH OR LOW BLOOD SUGAR  3   • BLISOVI FE 1/20 1-20 MG-MCG per tablet 1 Tab every day.  3   • Cholecalciferol (VITAMIN D) 2000 UNITS Cap Take  by mouth.     • Blood Glucose Monitoring Suppl Device Meter: Dispense insurance specific meter. Sig. Use as directed for blood sugar monitoring. #1. NR. DX: E11.9 1 Device 0   • Lancets Misc Lancets order: Lancets for  insurance specific meter. Sig: use qid and prn ssx high or low sugar. #100 RF x 3 DX: E11.9 100 Each 3   • Blood Glucose Monitoring Suppl SUPPLIES Misc Test strips order: Test strips for insurance specific meter. Sig: use qid and prn ssx high or low sugar #100 RF x 3. DX: E11.9 100 Each 3   • Multiple Vitamin (DAILY VITAMINS PO) Take  by mouth.       No facility-administered encounter medications on file as of 2018.        Social History     Social History   • Marital status:      Spouse name: N/A   • Number of children: 4   • Years of education: N/A     Occupational History   • student early childhood education           Social History Main Topics   • Smoking status: Former Smoker     Packs/day: 0.25     Years: 1.00     Types: Cigarettes     Quit date: 1991   • Smokeless tobacco: Never Used   • Alcohol use No      Comment: [employer] prohibits   • Drug use: No   • Sexual activity: Yes     Partners: Male     Birth control/ protection: Surgical     Other Topics Concern   • Not on file     Social History Narrative   • No narrative on file      History   Smoking Status   • Former Smoker   • Packs/day: 0.25   • Years: 1.00   • Types: Cigarettes   • Quit date: 1991   Smokeless Tobacco   • Never Used     History   Alcohol Use No     Comment: [employer] prohibits     History   Drug Use No      OB History    Para Term  AB Living   4 4 1 3       SAB TAB Ectopic Molar Multiple Live Births                    # Outcome Date GA Lbr Nixon/2nd Weight Sex Delivery Anes PTL Lv   4             3             2             1 Term                  No LMP recorded.    G P A L     Family History   Problem Relation Age of Onset   • Diabetes Mother    • Heart Disease Father    • Hypertension Father    • Hyperlipidemia Father    • Diabetes Brother    • Heart Disease Brother    • Hypertension Brother    • Hyperlipidemia Brother    • Diabetes Maternal Aunt    • Diabetes  Maternal Uncle    • Cancer Maternal Grandmother         Cervical/ovarian   • Diabetes Maternal Grandfather    • Heart Disease Maternal Grandfather    • Hypertension Maternal Grandfather    • Hyperlipidemia Maternal Grandfather    • Stroke Maternal Grandfather    • Stroke Paternal Grandmother    • Hyperlipidemia Paternal Grandmother    • Hypertension Paternal Grandmother    • Heart Disease Paternal Grandmother    • Heart Disease Paternal Grandfather    • Hypertension Paternal Grandfather    • Hyperlipidemia Paternal Grandfather        Review of Systems   Constitutional: Negative for chills and fever.   Cardiovascular: Negative for chest pain.   Gastrointestinal:        Lower abdominal cramping   Musculoskeletal: Positive for back pain and joint pain.   Skin: Negative for rash.        Objective:   /70   Pulse 70   Temp 36.3 °C (97.4 °F)   Resp 16   Wt 94.8 kg (209 lb)   SpO2 97%   BMI 35.87 kg/m²     Physical Exam   Constitutional: She is oriented to person, place, and time. She appears well-developed and well-nourished. No distress.   HENT:   Head: Normocephalic and atraumatic.   Right Ear: External ear normal.   Left Ear: External ear normal.   Eyes: Conjunctivae are normal. Right eye exhibits no discharge. Left eye exhibits no discharge. No scleral icterus.   Cardiovascular: Normal rate, regular rhythm and normal heart sounds.    Pulmonary/Chest: Effort normal. No stridor. No respiratory distress. She has no wheezes. She has no rales.   Musculoskeletal: She exhibits no edema.   No synovitis appreciated in the upper extremities.  Schoeber change was 4.5 cm; positive ALEXYS on the R side.   Lymphadenopathy:     She has no cervical adenopathy.   Neurological: She is alert and oriented to person, place, and time.   Skin: Skin is warm and dry. No rash noted. She is not diaphoretic. No erythema.   Limited skin exam.  No overt malar rash   Psychiatric: She has a normal mood and affect. Her behavior is normal.  Judgment and thought content normal.   Vitals reviewed.      Assessment:     1. Dysuria  URINALYSIS,CULTURE IF INDICATED   2. Positive SUMMER (antinuclear antibody)  CBC WITH DIFFERENTIAL    BUN    CREATININE    CRP QUANTITIVE (NON-CARDIAC)    WESTERGREN SED RATE    HEPATIC FUNCTION PANEL    MR-PELVIS W/O   3. ESR raised  MR-PELVIS W/O   4. Chronic low back pain, unspecified back pain laterality, with sciatica presence unspecified  MR-PELVIS W/O     Labs:      No results found for: QNTTBGOLD  Lab Results   Component Value Date/Time    HEPBSAG Negative 01/16/2018 09:12 AM     Lab Results   Component Value Date/Time    HEPCAB Negative 01/16/2018 09:12 AM     Lab Results   Component Value Date/Time    SODIUM 138 07/16/2018 09:36 AM    POTASSIUM 4.1 07/16/2018 09:36 AM    CHLORIDE 106 07/16/2018 09:36 AM    CO2 23 07/16/2018 09:36 AM    GLUCOSE 101 (H) 07/16/2018 09:36 AM    BUN 16 07/16/2018 09:36 AM    CREATININE 0.73 07/16/2018 09:36 AM    CREATININE 0.82 04/11/2016    BUNCREATRAT 23 03/09/2010 07:15 AM    GLOMRATE >59 03/09/2010 07:15 AM      Lab Results   Component Value Date/Time    WBC 5.9 07/16/2018 09:36 AM    WBC 6.9 03/09/2010 07:15 AM    RBC 4.11 (L) 07/16/2018 09:36 AM    RBC 4.50 03/09/2010 07:15 AM    HEMOGLOBIN 13.1 07/16/2018 09:36 AM    HEMATOCRIT 37.5 07/16/2018 09:36 AM    MCV 91.2 07/16/2018 09:36 AM    MCV 93 03/09/2010 07:15 AM    MCH 31.9 07/16/2018 09:36 AM    MCH 31.3 03/09/2010 07:15 AM    MCHC 34.9 07/16/2018 09:36 AM    MPV 10.9 07/16/2018 09:36 AM    NEUTSPOLYS 65.20 07/16/2018 09:36 AM    LYMPHOCYTES 25.10 07/16/2018 09:36 AM    MONOCYTES 7.70 07/16/2018 09:36 AM    EOSINOPHILS 1.40 07/16/2018 09:36 AM    BASOPHILS 0.30 07/16/2018 09:36 AM      Lab Results   Component Value Date/Time    CALCIUM 9.3 07/16/2018 09:36 AM    ASTSGOT 14 07/16/2018 09:38 AM    ALTSGPT 15 07/16/2018 09:38 AM    ALKPHOSPHAT 50 07/16/2018 09:38 AM    TBILIRUBIN 0.6 07/16/2018 09:38 AM    ALBUMIN 4.0 07/16/2018 09:38  AM    ALBUMIN 4.24 04/09/2018 08:59 AM    TOTPROTEIN 7.2 07/16/2018 09:38 AM    TOTPROTEIN 7.80 04/09/2018 08:59 AM     Lab Results   Component Value Date/Time    RHEUMFACTN <10 08/12/2017 08:35 AM    CCPANTIBODY 17 08/12/2017 08:35 AM    ANTINUCAB Detected (A) 07/16/2018 09:36 AM     Lab Results   Component Value Date/Time    SEDRATEWES 50 (H) 07/16/2018 09:36 AM    CREACTPROT 0.96 (H) 07/16/2018 09:36 AM     No results found for: DR MILTONVVTINTERP  Lab Results   Component Value Date/Time    H5JLTGROKIU 174.0 07/10/2017 09:15 AM    N3MHUAWEDHD 33.0 07/10/2017 09:15 AM     Lab Results   Component Value Date/Time    ANTIDNADS None Detected 07/16/2018 09:36 AM    RNPAB 0 07/16/2018 09:36 AM    SMITHAB 0 07/16/2018 09:36 AM    FSALLGJ45 0 08/12/2017 08:35 AM    CENTROMBAB 0 08/12/2017 08:35 AM     Lab Results   Component Value Date/Time    ANTIDNADS None Detected 07/16/2018 09:36 AM    W5LAMUKTTKE 174.0 07/10/2017 09:15 AM    JO1AB 0 08/12/2017 08:35 AM    RNPAB 0 07/16/2018 09:36 AM    ANTISSBSJ 0 07/16/2018 09:36 AM     Lab Results   Component Value Date/Time    COLORURINE Yellow 01/11/2018 09:01 AM    SPECGRAVITY 1.021 01/11/2018 09:01 AM    PHURINE 5.5 01/11/2018 09:01 AM    GLUCOSEUR Negative 01/11/2018 09:01 AM    KETONES Negative 01/11/2018 09:01 AM    PROTEINURIN Negative 01/11/2018 09:01 AM     Lab Results   Component Value Date/Time    TOTPROTUR 22.6 (H) 04/06/2015 07:04 AM     Lab Results   Component Value Date/Time    SSA60 0 07/16/2018 09:36 AM    SSA52 7 07/16/2018 09:36 AM     Lab Results   Component Value Date/Time    HBA1C 6.2 (H) 01/26/2018 06:47 AM     No results found for: CPKTOTAL  No results found for: G6PD  Lab Results   Component Value Date/Time    ENFI70BSHG Negative 08/12/2017 08:36 AM     No results found for: ACESERUM  Lab Results   Component Value Date/Time    25HYDROXY 28 (L) 01/26/2018 06:47 AM     Lab Results   Component Value Date/Time    TSH 0.763 03/09/2010 07:15 AM    JOSE  1.01 03/09/2010 07:15 AM     Lab Results   Component Value Date/Time    TSHULTRASEN 1.820 06/30/2016 09:30 AM    FREET4 0.79 04/06/2015 07:03 AM     Lab Results   Component Value Date/Time    MICROSOMALA 0.6 08/12/2017 08:35 AM    ANTITHYROGL <0.9 08/12/2017 08:35 AM     No results found for: IGGLYMEABS  Lab Results   Component Value Date/Time    ANTIMITOCHO 25.9 (H) 10/02/2017 08:55 AM    FACTIN 16 10/02/2017 08:55 AM     Lab Results   Component Value Date/Time     10/02/2017 08:55 AM    TTRANSIGA 8 10/01/2012 11:58 AM     No results found for: FLTYPE, CRYSTALSBDF  No results found for: ISTATICAL  No results found for: ISTATCREAT  No results found for: CTELOPEP  No results found for: GBMABG  No results found for: PTHINTACT      Medical Decision Making:  Today's Assessment / Status / Plan:     Positive SUMMER  Additional work-up was unrevealing.  Anti histone was negative.  She is off doxycyline. No new rashes, oral ulcers.    Low back pain  Worse in the morning  HLA B27 is negative  9/23/2013 bilateral hip xray noted SI joint were intact at the time however she has some positive findings on exam and back pain that seems to be worse in the morning and improves with exercise.  In additions he has ESR and CRP that remain elevated  Will obtain an MRI of the pelvis to assess for sacroiliitis - possible nonradiographic sacroiliitis      Raised ESR  CXR did not show hilar adenopathy  SPEP normal  UA is abnormal.  Did not repeat UA yet.  Will repeat UA and if abnormal consider checking anti GBM, ANCA and referral to nephrology  Will also recheck ESR and CRP    Elevated ALT  Resolved    Dysuria  Will check UA with culture    1. Dysuria  URINALYSIS,CULTURE IF INDICATED   2. Positive SUMMER (antinuclear antibody)  CBC WITH DIFFERENTIAL    BUN    CREATININE    CRP QUANTITIVE (NON-CARDIAC)    WESTERGREN SED RATE    HEPATIC FUNCTION PANEL    MR-PELVIS W/O   3. ESR raised  MR-PELVIS W/O   4. Chronic low back pain, unspecified  back pain laterality, with sciatica presence unspecified  MR-PELVIS W/O     Return in about 4 months (around 1/12/2019).        She agreed and verbalized her agreement and understanding with the current plan. I answered all questions and concerns she has at this time and advised her to call at any time in there interim with questions or concerns in regards to her care.    Thank you for allowing me to participate in her care, I will continue to follow closely.

## 2018-09-15 LAB
BACTERIA UR CULT: ABNORMAL
BACTERIA UR CULT: ABNORMAL
SIGNIFICANT IND 70042: ABNORMAL
SITE SITE: ABNORMAL
SOURCE SOURCE: ABNORMAL

## 2018-10-25 ENCOUNTER — HOSPITAL ENCOUNTER (OUTPATIENT)
Dept: RADIOLOGY | Facility: MEDICAL CENTER | Age: 48
End: 2018-10-25
Attending: INTERNAL MEDICINE
Payer: COMMERCIAL

## 2018-10-25 DIAGNOSIS — G89.29 CHRONIC LOW BACK PAIN, UNSPECIFIED BACK PAIN LATERALITY, WITH SCIATICA PRESENCE UNSPECIFIED: ICD-10-CM

## 2018-10-25 DIAGNOSIS — R70.0 ESR RAISED: ICD-10-CM

## 2018-10-25 DIAGNOSIS — M54.5 CHRONIC LOW BACK PAIN, UNSPECIFIED BACK PAIN LATERALITY, WITH SCIATICA PRESENCE UNSPECIFIED: ICD-10-CM

## 2018-10-25 DIAGNOSIS — R76.8 POSITIVE ANA (ANTINUCLEAR ANTIBODY): ICD-10-CM

## 2018-10-25 PROCEDURE — 72195 MRI PELVIS W/O DYE: CPT

## 2018-12-26 ENCOUNTER — OFFICE VISIT (OUTPATIENT)
Dept: MEDICAL GROUP | Facility: PHYSICIAN GROUP | Age: 48
End: 2018-12-26
Payer: COMMERCIAL

## 2018-12-26 VITALS
SYSTOLIC BLOOD PRESSURE: 120 MMHG | BODY MASS INDEX: 35.42 KG/M2 | TEMPERATURE: 97.3 F | DIASTOLIC BLOOD PRESSURE: 66 MMHG | HEIGHT: 64 IN | HEART RATE: 75 BPM | WEIGHT: 207.5 LBS | OXYGEN SATURATION: 96 %

## 2018-12-26 DIAGNOSIS — R76.8 POSITIVE ANA (ANTINUCLEAR ANTIBODY): ICD-10-CM

## 2018-12-26 DIAGNOSIS — E11.69 HYPERLIPIDEMIA ASSOCIATED WITH TYPE 2 DIABETES MELLITUS (HCC): ICD-10-CM

## 2018-12-26 DIAGNOSIS — Z23 NEED FOR VACCINATION: ICD-10-CM

## 2018-12-26 DIAGNOSIS — E78.5 HYPERLIPIDEMIA ASSOCIATED WITH TYPE 2 DIABETES MELLITUS (HCC): ICD-10-CM

## 2018-12-26 DIAGNOSIS — E66.9 CLASS 2 OBESITY WITH BODY MASS INDEX (BMI) OF 35.0 TO 35.9 IN ADULT, UNSPECIFIED OBESITY TYPE, UNSPECIFIED WHETHER SERIOUS COMORBIDITY PRESENT: ICD-10-CM

## 2018-12-26 DIAGNOSIS — L71.9 ROSACEA: ICD-10-CM

## 2018-12-26 DIAGNOSIS — E66.9 OBESITY (BMI 30-39.9): ICD-10-CM

## 2018-12-26 DIAGNOSIS — E11.9 CONTROLLED TYPE 2 DIABETES MELLITUS WITHOUT COMPLICATION, WITHOUT LONG-TERM CURRENT USE OF INSULIN (HCC): ICD-10-CM

## 2018-12-26 PROCEDURE — 90471 IMMUNIZATION ADMIN: CPT | Performed by: INTERNAL MEDICINE

## 2018-12-26 PROCEDURE — 99204 OFFICE O/P NEW MOD 45 MIN: CPT | Mod: 25 | Performed by: INTERNAL MEDICINE

## 2018-12-26 PROCEDURE — 90686 IIV4 VACC NO PRSV 0.5 ML IM: CPT | Performed by: INTERNAL MEDICINE

## 2018-12-26 NOTE — ASSESSMENT & PLAN NOTE
This is a chronic health problem that is well controlled with current medications and lifestyle measures.  Last lab work in January 2018 showing HbA1c at 6.2%, currently on metformin thousand milligrams twice daily. Last retinal check in 10/2018 was normal at [facility] with .

## 2018-12-26 NOTE — ASSESSMENT & PLAN NOTE
This is a chronic health problem that is well controlled with current medications and lifestyle measures.  Has been on doxycycline which was later slowly stopped. But still taking on and off.

## 2018-12-26 NOTE — PROGRESS NOTES
Monofilament testing with a 10 gram force: sensation intact: intact bilaterally  Visual Inspection: Feet without maceration, ulcers, fissures.  Pedal pulses: intact bilaterally

## 2018-12-26 NOTE — PROGRESS NOTES
CC: To establish care with new PCP, diabetes mellitus.    HISTORY OF THE PRESENT ILLNESS: Patient is a 48 y.o. female. This pleasant patient is here today to discuss on medical conditions as mentioned in HPI below.    Health Maintenance: Patient follows with gynecologist Dr. Willson and recently had her Pap smear which was normal in April 2018 will need to get the records.      Positive SUMMER (antinuclear antibody)  This is a chronic health problem that is uncontrolled with current medications and lifestyle measures.  Patient was following rheumatology Dr. Reyes last visited in September 2018 with all the workup including antimitochondrial antibody was all negative, supposed to get an MRI of sacroiliac joints to check for sacroiliitis.    Rosacea  This is a chronic health problem that is well controlled with current medications and lifestyle measures.  Has been on doxycycline which was later slowly stopped. But still taking on and off.    Controlled type 2 diabetes mellitus without complication, without long-term current use of insulin (CMS-[facility])  This is a chronic health problem that is well controlled with current medications and lifestyle measures.  Last lab work in January 2018 showing HbA1c at 6.2%, currently on metformin thousand milligrams twice daily. Last retinal check in 10/2018 was normal at [facility] with .    Hyperlipidemia associated with type 2 diabetes mellitus  This is a chronic health problem that is well controlled with current medications and lifestyle measures. Last checked in 01/2018 and currently on Atorvastatin 20 mg daily currently once a week.    PHQ score 0, BMI > 35 , former tobacco, no fall injuries    Allergies: Bactrim [sulfamethoxazole w-trimethoprim]    Current Outpatient Prescriptions Ordered in [facility]   Medication Sig Dispense Refill   • metFORMIN ER (GLUCOPHAGE XR) 500 MG TABLET SR 24 HR TAKE 2 TABLETS BY MOUTH 2 TIMES A DAY. 360 Tab 2   • DOXYCYCLINE PO Take  by mouth.      • atorvastatin (LIPITOR) 20 MG Tab TAKE 1 TAB BY MOUTH EVERY DAY. 90 Tab 0   • B Complex Vitamins (VITAMIN B COMPLEX PO) Take  by mouth.     • Cholecalciferol (VITAMIN D) 2000 UNITS Cap Take  by mouth.     • Multiple Vitamin (DAILY VITAMINS PO) Take  by mouth.     • ibuprofen (MOTRIN) 200 MG Tab Take 200 mg by mouth every 6 hours as needed.     • metronidazole (METROCREAM) 0.75 % cream Apply  to affected area(s) 2 times a day.     • fluticasone (FLONASE) 50 MCG/ACT nasal spray Spray 1 Spray in nose 2 times a day. Each Nostril 1 Bottle 3     No current Epic-ordered facility-administered medications on file.        Past Medical History:   Diagnosis Date   • Allergy    • Anemia    • Arthritis    • Hyperlipidemia    • Hypertriglyceridemia 3/17/2010       History reviewed. No pertinent surgical history.    Social History   Substance Use Topics   • Smoking status: Former Smoker     Packs/day: 0.25     Years: 1.00     Types: Cigarettes     Quit date: 1/1/1991   • Smokeless tobacco: Never Used   • Alcohol use No      Comment: [employer] prohibits       Social History     Social History Narrative   • No narrative on file       Family History   Problem Relation Age of Onset   • Diabetes Mother    • Heart Disease Father    • Hypertension Father    • Hyperlipidemia Father    • Diabetes Brother    • Heart Disease Brother    • Hypertension Brother    • Hyperlipidemia Brother    • Diabetes Maternal Aunt    • Diabetes Maternal Uncle    • Cancer Maternal Grandmother         Cervical/ovarian   • Diabetes Maternal Grandfather    • Heart Disease Maternal Grandfather    • Hypertension Maternal Grandfather    • Hyperlipidemia Maternal Grandfather    • Stroke Maternal Grandfather    • Stroke Paternal Grandmother    • Hyperlipidemia Paternal Grandmother    • Hypertension Paternal Grandmother    • Heart Disease Paternal Grandmother    • Heart Disease Paternal Grandfather    • Hypertension Paternal Grandfather    • Hyperlipidemia Paternal  "Grandfather        ROS:     - Constitutional: Negative for fever, chills, unexpected weight change, and fatigue/generalized weakness.     - HEENT: Negative for headaches, vision changes, hearing changes, ear pain, ear discharge, rhinorrhea, sinus congestion, sore throat, and neck pain.      - Respiratory: Negative for cough, sputum production, chest congestion, dyspnea, wheezing, and crackles.      - Cardiovascular: Negative for chest pain, palpitations, orthopnea, and bilateral lower extremity edema.     - Gastrointestinal: Negative for heartburn, nausea, vomiting, abdominal pain, hematochezia, melena, diarrhea, constipation, and greasy/foul-smelling stools.     - Genitourinary: Negative for dysuria, polyuria, hematuria, pyuria, urinary urgency, and urinary incontinence.     - Musculoskeletal: Negative for myalgias, back pain, and joint pain.     - Skin: Negative for rash, itching, cyanotic skin color change.     - Neurological: Negative for dizziness, tingling, tremors, focal sensory deficit, focal weakness and headaches.     - Endo/Heme/Allergies: Does not bruise/bleed easily.     - Psychiatric/Behavioral: Negative for depression, suicidal/homicidal ideation and memory loss.      Last lab work in January 2018 reviewed and discussed with the patient.    Exam: Blood pressure 120/66, pulse 75, temperature 36.3 °C (97.3 °F), temperature source Temporal, height 1.626 m (5' 4\"), weight 94.1 kg (207 lb 8 oz), SpO2 96 %, not currently breastfeeding. Body mass index is 35.62 kg/m².    General: Normal appearing. No distress.  HEENT: Normocephalic. Eyes conjunctiva clear lids without ptosis, pupils equal and reactive to light accommodation, ears normal shape and contour, canals are clear bilaterally, tympanic membranes are benign, nasal mucosa benign, oropharynx is without erythema, edema or exudates.   Neck: Supple without JVD or bruit. Thyroid is not enlarged.  Pulmonary: Clear to ausculation.  Normal effort. No rales, " ronchi, or wheezing.  Cardiovascular: Regular rate and rhythm without murmur. Carotid and radial pulses are intact and equal bilaterally.  Abdomen: Soft, nontender, nondistended. Normal bowel sounds. Liver and spleen are not palpable  Neurologic: Grossly nonfocal  Lymph: No cervical, supraclavicular or axillary lymph nodes are palpable  Skin: Warm and dry.  No obvious lesions.  Musculoskeletal: Normal gait. No extremity cyanosis, clubbing, or edema.  Psych: Normal mood and affect. Alert and oriented x3. Judgment and insight is normal.    Please note that this dictation was created using voice recognition software. I have made every reasonable attempt to correct obvious errors, but I expect that there are errors of grammar and possibly content that I did not discover before finalizing the note.      Assessment/Plan  1. Controlled type 2 diabetes mellitus without complication, without long-term current use of insulin (HCC)  Well-controlled, last HbA1c in January 2018 was 6.2%.  Will recheck the HbA1c at this time, continue metformin thousand milligrams twice daily.To follow ADA diet mentioned in after visit summary today.   - Diabetic Monofilament Lower Extremity Exam  - HEMOGLOBIN A1C; Future  - Lipid Profile; Future    2. Positive SUMMER (antinuclear antibody)  3. Rosacea  Asymptomatic, patient had all the workup required done for positive SUMMER which was all negative.  Reviewed the recent rheumatology note Dr. Reyes, also reviewed gastroenterology note of Dr. Walls for possible autoimmune hepatitis including liver biopsy was done which was positive for fatty liver as per the patient.  MRI for sacroiliac joint was negative for sacroiliitis.  For now continue only Flagyl topical cream for rosacea flareup otherwise to continue current lifestyle modification and follow-up rheumatology as needed.    4. Obesity (BMI 30-39.9)  5. Class 2 obesity with body mass index (BMI) of 35.0 to 35.9 in adult, unspecified obesity type,  unspecified whether serious comorbidity present  Pt educated on the increase of morbidity and mortality associated with excess weight including DM, Heart Disease, HTN, stroke, and sleep apnea.  Pt advised weight loss of 5% through reduced calorie, low fat diet and 150 mins of exercise a week  Recommend obesity clinic if patient is unsuccessful with weight loss    6. Hyperlipidemia associated with type 2 diabetes mellitus (HCC)  Patient has been noncompliant with atorvastatin.  Last lipid panel in January 2018 with borderline elevated LDL, patient does not have any other risk factors for using atorvastatin a high intensity statin.  Will recheck a lipid panel at this time and wean it down to a low intensity statin discussed with the patient which he agreed for the plan.    7. Need for vaccination  - Influenza Vaccine Quad Injection >3Y (PF)

## 2018-12-26 NOTE — ASSESSMENT & PLAN NOTE
This is a chronic health problem that is uncontrolled with current medications and lifestyle measures.  Patient was following rheumatology Dr. Reyes last visited in September 2018 with all the workup including antimitochondrial antibody was all negative, supposed to get an MRI of sacroiliac joints to check for sacroiliitis.

## 2018-12-26 NOTE — PATIENT INSTRUCTIONS
[facility] - FREE WALKING VIDEOS ON YOU TUBE  =======================================  American Diabetes Association (ADA) nutritional guidelines --   1. A diet that includes carbohydrates from fruits, vegetables, whole grains, legumes, and low-fat milk is encouraged.The use of lower glycemic index and glycemic load meals may provide a modest additional benefit for glycemic control.  Low-Glycemic Foods  1. Sweet potatoes  2. Many vegetables, including leafy greens, asparagus, cauliflower  3. Steel-cut oatmeal  4. Farrow  5. Quinoa  6. Legumes, including lentils, chickpeas  7. Filipe bread  8. Skim milk  9. Reduced-fat yogurt  10. Sesame seeds, peanuts, flax seeds  2. A variety of eating patterns (Mediterranean, low fat, low carbohydrate, vegetarian) are acceptable.  3. Fat quality is more important than fat quantity-  with monounsaturated and polyunsaturated fatty acids (eg, those found in fish, olive oil, nuts).   4. Protein intake goals should be individualized but not lower than 0.8 g/kg body weight per day (the recommended daily allowance). Patients should be encouraged to substitute lean meats, fish, eggs, beans, peas, soy products, and nuts and seeds for red meat.  5. Fiber intake should be at least 14 grams per 1000 calories daily; higher fiber intake may improve glycemic control.  6. A reduced sodium intake of 2300 mg per day with a diet high in fruits, vegetables, and low-fat dairy products is prudent. For individuals with hypertension, further reduction in sodium may be necessary.  7. Foods containing sucrose to be avoided.  Physical Activity - 30 minutes or more of moderate-intensity physical activity on most days of the week .    Eladia Yates M.D.  ===============================

## 2018-12-26 NOTE — ASSESSMENT & PLAN NOTE
This is a chronic health problem that is well controlled with current medications and lifestyle measures. Last checked in 01/2018 and currently on Atorvastatin 20 mg daily currently once a week.

## 2019-02-15 ENCOUNTER — HOSPITAL ENCOUNTER (OUTPATIENT)
Dept: LAB | Facility: MEDICAL CENTER | Age: 49
End: 2019-02-15
Attending: INTERNAL MEDICINE
Payer: COMMERCIAL

## 2019-02-15 DIAGNOSIS — E11.9 CONTROLLED TYPE 2 DIABETES MELLITUS WITHOUT COMPLICATION, WITHOUT LONG-TERM CURRENT USE OF INSULIN (HCC): ICD-10-CM

## 2019-02-15 LAB
CHOLEST SERPL-MCNC: 164 MG/DL (ref 100–199)
EST. AVERAGE GLUCOSE BLD GHB EST-MCNC: 111 MG/DL
HBA1C MFR BLD: 5.5 % (ref 0–5.6)
HDLC SERPL-MCNC: 43 MG/DL
LDLC SERPL CALC-MCNC: 94 MG/DL
TRIGL SERPL-MCNC: 135 MG/DL (ref 0–149)

## 2019-02-15 PROCEDURE — 36415 COLL VENOUS BLD VENIPUNCTURE: CPT

## 2019-02-15 PROCEDURE — 80061 LIPID PANEL: CPT

## 2019-02-15 PROCEDURE — 83036 HEMOGLOBIN GLYCOSYLATED A1C: CPT

## 2019-02-23 NOTE — TELEPHONE ENCOUNTER
Was the patient seen in the last year in this department? Yes    Does patient have an active prescription for medications requested? No     Received Request Via: Pharmacy      Pt met protocol?: Yes    LAST OV 12/26/2018      Lab Results  Component Value Date/Time   HBA1C 5.5 02/15/2019 1006       Lab Results  Component Value Date/Time   AVGLUC 111 02/15/2019 1006       Lab Results  Component Value Date/Time   CHOLSTRLTOT 164 02/15/2019 1006       Lab Results  Component Value Date/Time   TRIGLYCERIDE 135 02/15/2019 1006       Lab Results  Component Value Date/Time   HDL 43 02/15/2019 1006       Lab Results  Component Value Date/Time   LDL 94 02/15/2019 1006

## 2019-02-25 RX ORDER — METFORMIN HYDROCHLORIDE 500 MG/1
TABLET, EXTENDED RELEASE ORAL
Qty: 360 TAB | Refills: 0 | Status: SHIPPED | OUTPATIENT
Start: 2019-02-25 | End: 2019-03-27 | Stop reason: SDUPTHER

## 2019-03-27 ENCOUNTER — OFFICE VISIT (OUTPATIENT)
Dept: MEDICAL GROUP | Facility: PHYSICIAN GROUP | Age: 49
End: 2019-03-27
Payer: COMMERCIAL

## 2019-03-27 VITALS
HEIGHT: 64 IN | TEMPERATURE: 97.5 F | OXYGEN SATURATION: 94 % | HEART RATE: 73 BPM | SYSTOLIC BLOOD PRESSURE: 118 MMHG | BODY MASS INDEX: 36.54 KG/M2 | DIASTOLIC BLOOD PRESSURE: 74 MMHG | WEIGHT: 214 LBS

## 2019-03-27 DIAGNOSIS — E66.9 CLASS 2 OBESITY WITH BODY MASS INDEX (BMI) OF 36.0 TO 36.9 IN ADULT, UNSPECIFIED OBESITY TYPE, UNSPECIFIED WHETHER SERIOUS COMORBIDITY PRESENT: ICD-10-CM

## 2019-03-27 DIAGNOSIS — E66.9 OBESITY (BMI 35.0-39.9 WITHOUT COMORBIDITY): ICD-10-CM

## 2019-03-27 DIAGNOSIS — H92.02 LEFT EAR PAIN: ICD-10-CM

## 2019-03-27 DIAGNOSIS — E11.9 CONTROLLED TYPE 2 DIABETES MELLITUS WITHOUT COMPLICATION, WITHOUT LONG-TERM CURRENT USE OF INSULIN (HCC): ICD-10-CM

## 2019-03-27 DIAGNOSIS — E66.9 OBESITY (BMI 30-39.9): ICD-10-CM

## 2019-03-27 PROCEDURE — 99214 OFFICE O/P EST MOD 30 MIN: CPT | Performed by: INTERNAL MEDICINE

## 2019-03-27 RX ORDER — METFORMIN HYDROCHLORIDE 500 MG/1
1000 TABLET, EXTENDED RELEASE ORAL DAILY
Qty: 180 TAB | Refills: 0 | Status: SHIPPED | OUTPATIENT
Start: 2019-03-27 | End: 2019-05-22

## 2019-03-27 RX ORDER — OFLOXACIN 3 MG/ML
5 SOLUTION AURICULAR (OTIC) DAILY
Qty: 10 ML | Refills: 0 | Status: SHIPPED | OUTPATIENT
Start: 2019-03-27 | End: 2019-06-14

## 2019-03-27 ASSESSMENT — PATIENT HEALTH QUESTIONNAIRE - PHQ9: CLINICAL INTERPRETATION OF PHQ2 SCORE: 0

## 2019-03-27 NOTE — PROGRESS NOTES
CC: Follow-up visit, diabetes mellitus.    HISTORY OF PRESENT ILLNESS: Patient is a 48 y.o. female established patient who presents today to discuss her medical conditions as mentioned in HPI below.    Health Maintenance: Due for urine microalbumin for her diabetes, will do one at this time.  Patient is due for Pap visit which is going to come for next office visit.    Controlled type 2 diabetes mellitus without complication, without long-term current use of insulin (CMS-HCC)  This is a chronic health problem that is well controlled with current medications and lifestyle measures. Recent A1C is within normal in 02/2019 at 5.5. Currently on Metformin 1000 mg once a day which patient self managed by changing it from 1000 mg BID.     Left ear pain  New problem started yesterday . Pt tried to put Tea tree oil with Q tip to fix it but does have radiation to the left shoulder. No fever or discharge from the ear.        PHQ score 0, BMI > 36 , former tobacco, no fall injuries.    Patient Active Problem List    Diagnosis Date Noted   • Left ear pain 03/27/2019   • Obesity (BMI 35.0-39.9 without comorbidity) ([facility]) 03/27/2019   • Positive SUMMER (antinuclear antibody) 12/26/2018   • Obesity (BMI 30-39.9) 03/09/2018   • Nonalcoholic fatty liver disease 03/09/2018   • Controlled type 2 diabetes mellitus without complication, without long-term current use of insulin ([facility]) 04/27/2016   • Vitamin D deficiency 04/27/2016   • Rosacea 04/27/2016   • Hyperlipidemia associated with type 2 diabetes mellitus ([facility]) 03/01/2012   • PCOS (polycystic ovarian syndrome) 02/22/2012   • Hypertriglyceridemia 03/17/2010      Allergies:Bactrim [sulfamethoxazole w-trimethoprim]    Current Outpatient Prescriptions   Medication Sig Dispense Refill   • metFORMIN ER (GLUCOPHAGE XR) 500 MG TABLET SR 24 HR Take 2 Tabs by mouth every day. 180 Tab 0   • ofloxacin otic sol (FLOXIN OTIC) 0.3 % Solution Place 5 Drops in left ear every day. For 7 days 10 mL 0   •  B Complex Vitamins (VITAMIN B COMPLEX PO) Take  by mouth.     • Cholecalciferol (VITAMIN D) 2000 UNITS Cap Take  by mouth.     • Multiple Vitamin (DAILY VITAMINS PO) Take  by mouth.     • DOXYCYCLINE PO Take  by mouth.     • atorvastatin (LIPITOR) 20 MG Tab TAKE 1 TAB BY MOUTH EVERY DAY. (Patient not taking: Reported on 3/27/2019) 90 Tab 0   • ibuprofen (MOTRIN) 200 MG Tab Take 200 mg by mouth every 6 hours as needed.     • metronidazole (METROCREAM) 0.75 % cream Apply  to affected area(s) 2 times a day.     • fluticasone (FLONASE) 50 MCG/ACT nasal spray Spray 1 Spray in nose 2 times a day. Each Nostril 1 Bottle 3     No current facility-administered medications for this visit.        Social History   Substance Use Topics   • Smoking status: Former Smoker     Packs/day: 0.25     Years: 1.00     Types: Cigarettes     Quit date: 1/1/1991   • Smokeless tobacco: Never Used   • Alcohol use No      Comment: [employer] prohibits     Social History     Social History Narrative   • No narrative on file       Family History   Problem Relation Age of Onset   • Diabetes Mother    • Heart Disease Father    • Hypertension Father    • Hyperlipidemia Father    • Diabetes Brother    • Heart Disease Brother    • Hypertension Brother    • Hyperlipidemia Brother    • Diabetes Maternal Aunt    • Diabetes Maternal Uncle    • Cancer Maternal Grandmother         Cervical/ovarian   • Diabetes Maternal Grandfather    • Heart Disease Maternal Grandfather    • Hypertension Maternal Grandfather    • Hyperlipidemia Maternal Grandfather    • Stroke Maternal Grandfather    • Stroke Paternal Grandmother    • Hyperlipidemia Paternal Grandmother    • Hypertension Paternal Grandmother    • Heart Disease Paternal Grandmother    • Heart Disease Paternal Grandfather    • Hypertension Paternal Grandfather    • Hyperlipidemia Paternal Grandfather         ROS:     - Constitutional:  Negative for fever, chills, unexpected weight change, and fatigue/generalized  "weakness.    - HEENT: As mentioned in HPI above.  Negative for headaches, vision changes, hearing changes, ear discharge, rhinorrhea, sinus congestion, sore throat, and neck pain.      - Respiratory: Negative for cough, sputum production, chest congestion, dyspnea, wheezing, and crackles.      - Cardiovascular: Negative for chest pain, palpitations, orthopnea, and bilateral lower extremity edema.     - Gastrointestinal: Negative for heartburn, nausea, vomiting, abdominal pain, hematochezia, melena, diarrhea, constipation, and greasy/foul-smelling stools.     - Genitourinary: Negative for dysuria, polyuria, hematuria, pyuria, urinary urgency, and urinary incontinence.     - Musculoskeletal: Negative for myalgias, back pain, and joint pain.     - Skin: Negative for rash, itching, cyanotic skin color change.     - Neurological: Negative for dizziness, tingling, tremors, focal sensory deficit, focal weakness and headaches.     - Endo/Heme/Allergies: Does not bruise/bleed easily.     - Psychiatric/Behavioral: Negative for depression, suicidal/homicidal ideation and memory loss.      Recent lab work in February 2019 reviewed and discussed with the patient.      Exam:    Blood pressure 118/74, pulse 73, temperature 36.4 °C (97.5 °F), temperature source Temporal, height 1.626 m (5' 4\"), weight 97.1 kg (214 lb), SpO2 94 %, not currently breastfeeding. Body mass index is 36.73 kg/m².    General:  Well nourished, well developed female in NAD  Head is grossly normal.  Ear exam : Positive for mild erythema in the left external auditory meatus, no discharge, tympanic membrane intact.  Right ear exam normal.  Neck: Supple without JVD or bruit. Thyroid is not enlarged.  Pulmonary: Clear to ausculation and percussion.  Normal effort. No rales, ronchi, or wheezing.  Cardiovascular: Regular rate and rhythm without murmur. Carotid and radial pulses are intact and equal bilaterally.  Extremities: no clubbing, cyanosis, or " edema.    Please note that this dictation was created using voice recognition software. I have made every reasonable attempt to correct obvious errors, but I expect that there are errors of grammar and possibly content that I did not discover before finalizing the note.    Assessment/Plan:  1. Left ear pain  New problem, as mentioned in HPI above and my physical exam findings will start on empiric eardrops for avoiding any secondary infection.  - ofloxacin otic sol (FLOXIN OTIC) 0.3 % Solution; Place 5 Drops in left ear every day. For 7 days  Dispense: 10 mL; Refill: 0    2. Controlled type 2 diabetes mellitus without complication, without long-term current use of insulin (HCC)  Well-controlled, recent HbA1c in February 2019 at 5.5%.  Patient has self managed and decreased the dose of her metformin to thousand milligrams daily instead of twice daily which she was taking in the past.  Emphasized her to keep up the diet management and continue the current change dose of thousand milligrams daily which we will check on it after 3 months and adjust if needed.  Patient understood and agreed for the plan.  Patient is trying a new diet called [employer] online, emphasized on the list of low glycemic foods provided in her after visit summary today.  - metFORMIN ER (GLUCOPHAGE XR) 500 MG TABLET SR 24 HR; Take 2 Tabs by mouth every day.  Dispense: 180 Tab; Refill: 0    3. Obesity (BMI 30-39.9)  4. Class 2 obesity with body mass index (BMI) of 36.0 to 36.9 in adult, unspecified obesity type, unspecified whether serious comorbidity present  Pt educated on the increase of morbidity and mortality associated with excess weight including DM, Heart Disease, HTN, stroke, and sleep apnea.  Pt advised weight loss of 5% through reduced calorie, low fat diet and 150 mins of exercise a week  Recommend obesity clinic if patient is unsuccessful with weight loss

## 2019-03-27 NOTE — ASSESSMENT & PLAN NOTE
This is a chronic health problem that is well controlled with current medications and lifestyle measures. Recent A1C is within normal in 02/2019 at 5.5. Currently on Metformin 1000 mg once a day which patient self managed by changing it from 1000 mg BID.

## 2019-03-27 NOTE — PATIENT INSTRUCTIONS
American Diabetes Association (ADA) nutritional guidelines --   1. A diet that includes carbohydrates from fruits, vegetables, whole grains, legumes, and low-fat milk is encouraged.The use of lower glycemic index and glycemic load meals may provide a modest additional benefit for glycemic control.  Low-Glycemic Foods  1. Sweet potatoes  2. Many vegetables, including leafy greens, asparagus, cauliflower  3. Steel-cut oatmeal  4. Farrow  5. Quinoa  6. Legumes, including lentils, chickpeas  7. Filipe bread  8. Skim milk  9. Reduced-fat yogurt  10. Sesame seeds, peanuts, flax seeds  2. A variety of eating patterns (Mediterranean, low fat, low carbohydrate, vegetarian) are acceptable.  3. Fat quality is more important than fat quantity-  with monounsaturated and polyunsaturated fatty acids (eg, those found in fish, olive oil, nuts).   4. Protein intake goals should be individualized but not lower than 0.8 g/kg body weight per day (the recommended daily allowance). Patients should be encouraged to substitute lean meats, fish, eggs, beans, peas, soy products, and nuts and seeds for red meat.  5. Fiber intake should be at least 14 grams per 1000 calories daily; higher fiber intake may improve glycemic control.  6. A reduced sodium intake of 2300 mg per day with a diet high in fruits, vegetables, and low-fat dairy products is prudent. For individuals with hypertension, further reduction in sodium may be necessary.  7. Foods containing sucrose to be avoided.  Physical Activity - 30 minutes or more of moderate-intensity physical activity on most days of the week .

## 2019-04-02 ENCOUNTER — PATIENT MESSAGE (OUTPATIENT)
Dept: HEALTH INFORMATION MANAGEMENT | Facility: OTHER | Age: 49
End: 2019-04-02

## 2019-04-16 ENCOUNTER — HOSPITAL ENCOUNTER (OUTPATIENT)
Facility: MEDICAL CENTER | Age: 49
End: 2019-04-16
Attending: INTERNAL MEDICINE
Payer: COMMERCIAL

## 2019-04-16 DIAGNOSIS — E11.9 CONTROLLED TYPE 2 DIABETES MELLITUS WITHOUT COMPLICATION, WITHOUT LONG-TERM CURRENT USE OF INSULIN (HCC): ICD-10-CM

## 2019-04-16 LAB
CREAT UR-MCNC: 233.4 MG/DL
MICROALBUMIN UR-MCNC: <0.7 MG/DL
MICROALBUMIN/CREAT UR: NORMAL MG/G (ref 0–30)

## 2019-04-16 PROCEDURE — 82043 UR ALBUMIN QUANTITATIVE: CPT

## 2019-04-16 PROCEDURE — 82570 ASSAY OF URINE CREATININE: CPT

## 2019-04-17 ENCOUNTER — OFFICE VISIT (OUTPATIENT)
Dept: MEDICAL GROUP | Facility: PHYSICIAN GROUP | Age: 49
End: 2019-04-17
Payer: COMMERCIAL

## 2019-04-17 ENCOUNTER — HOSPITAL ENCOUNTER (OUTPATIENT)
Facility: MEDICAL CENTER | Age: 49
End: 2019-04-17
Attending: INTERNAL MEDICINE
Payer: COMMERCIAL

## 2019-04-17 VITALS
SYSTOLIC BLOOD PRESSURE: 124 MMHG | DIASTOLIC BLOOD PRESSURE: 80 MMHG | HEART RATE: 62 BPM | BODY MASS INDEX: 36.73 KG/M2 | TEMPERATURE: 97.7 F | OXYGEN SATURATION: 97 % | HEIGHT: 64 IN

## 2019-04-17 DIAGNOSIS — Z12.4 PAP SMEAR FOR CERVICAL CANCER SCREENING: ICD-10-CM

## 2019-04-17 PROCEDURE — 87624 HPV HI-RISK TYP POOLED RSLT: CPT

## 2019-04-17 PROCEDURE — 88175 CYTOPATH C/V AUTO FLUID REDO: CPT

## 2019-04-17 PROCEDURE — 99396 PREV VISIT EST AGE 40-64: CPT | Performed by: INTERNAL MEDICINE

## 2019-04-18 NOTE — PROGRESS NOTES
SUBJECTIVE: 48 y.o. female for annual routine gynecologic exam  Chief Complaint   Patient presents with   • Gynecologic Exam       Obstetric History       T1      L0     SAB0   TAB0   Ectopic0   Molar0   Multiple0   Live Births0       Last Pap: More than 1 year back , normal as per the pt we do not have any records.  History   Sexual Activity   • Sexual activity: Yes   • Partners: Male   • Birth control/ protection: Surgical     Sexual history: currently sexually active   H/O Abnormal Pap no  She  reports that she quit smoking about 28 years ago. Her smoking use included Cigarettes. She has a 0.25 pack-year smoking history. She has never used smokeless tobacco.        Allergies: Bactrim [sulfamethoxazole w-trimethoprim]     ROS:    Reports mild menopause symptoms of hot flashes, night sweats, sleep disruption, mood changes.Denies vaginal dryness.   Menses every month with 5 days moderate bleeding   Cramping is mild.   She does take OTC analgesics for cramping  No significant bloating/fluid retention, pelvic pain, or dyspareunia. No vaginal discharge   No breast tenderness, mass, nipple discharge, changes in size or contour, or abnormal cyclic discomfort.  No urinary tract symptoms, no incontinence, no polydipsia, polyuria,  No abdominal pain, change in bowel habits, black or bloody stools.    No unusual headaches, no visual changes, menstrual migraines   No prolonged cough. No dyspnea or chest pain on exertion.  No depression, labile mood, anxiety, libido changes, insomnia.  No temperature intolerance.  No new/concerning skin lesions, concerns.     Exercise: minimal exercise  Preventive Care: UPtodate    Current medicines (including changes today)  Current Outpatient Prescriptions   Medication Sig Dispense Refill   • metFORMIN ER (GLUCOPHAGE XR) 500 MG TABLET SR 24 HR Take 2 Tabs by mouth every day. 180 Tab 0   • atorvastatin (LIPITOR) 20 MG Tab TAKE 1 TAB BY MOUTH EVERY DAY. 90 Tab 0   • B Complex  "Vitamins (VITAMIN B COMPLEX PO) Take  by mouth.     • Cholecalciferol (VITAMIN D) 2000 UNITS Cap Take  by mouth.     • Multiple Vitamin (DAILY VITAMINS PO) Take  by mouth.     • ofloxacin otic sol (FLOXIN OTIC) 0.3 % Solution Place 5 Drops in left ear every day. For 7 days 10 mL 0   • DOXYCYCLINE PO Take  by mouth.     • ibuprofen (MOTRIN) 200 MG Tab Take 200 mg by mouth every 6 hours as needed.     • metronidazole (METROCREAM) 0.75 % cream Apply  to affected area(s) 2 times a day.     • fluticasone (FLONASE) 50 MCG/ACT nasal spray Spray 1 Spray in nose 2 times a day. Each Nostril 1 Bottle 3     No current facility-administered medications for this visit.      She  has a past medical history of Allergy; Anemia; Arthritis; Hyperlipidemia; and Hypertriglyceridemia (3/17/2010). She also has no past medical history of Breast cancer (HCC).  She  has no past surgical history on file.     Family History:   Family History   Problem Relation Age of Onset   • Diabetes Mother    • Heart Disease Father    • Hypertension Father    • Hyperlipidemia Father    • Diabetes Brother    • Heart Disease Brother    • Hypertension Brother    • Hyperlipidemia Brother    • Diabetes Maternal Aunt    • Diabetes Maternal Uncle    • Cancer Maternal Grandmother         Cervical/ovarian   • Diabetes Maternal Grandfather    • Heart Disease Maternal Grandfather    • Hypertension Maternal Grandfather    • Hyperlipidemia Maternal Grandfather    • Stroke Maternal Grandfather    • Stroke Paternal Grandmother    • Hyperlipidemia Paternal Grandmother    • Hypertension Paternal Grandmother    • Heart Disease Paternal Grandmother    • Heart Disease Paternal Grandfather    • Hypertension Paternal Grandfather    • Hyperlipidemia Paternal Grandfather        OBJECTIVE:   /80 (BP Location: Left arm, Patient Position: Sitting, BP Cuff Size: Large adult)   Pulse 62   Temp 36.5 °C (97.7 °F) (Temporal)   Ht 1.626 m (5' 4\")   SpO2 97%   BMI 36.73 kg/m² "   Body mass index is 36.73 kg/m².    HEAD AND NECK:  Ears normal.  Throat, oral cavity and tongue normal.  Neck supple. No adenopathy or masses in the neck or supraclavicular regions.  No carotid bruits. No thyromegaly. NEURO: Cranial nerves are normal. DTR's normal and symmetric.  CHEST:  Clear, good air entry, no wheezes or rales. HEART:  Regular rate and rhythm.  S1 and S2 normal. No edema or JVD. ABDOMEN:  Soft without tenderness, guarding, mass or organomegaly.  No CVA tenderness or inguinal adenopathy. EXTREMITIES:  Extremities, reflexes and peripheral pulses are normal. SKIN: color normal, vascularity normal, no edema, temperature normal   No rashes or suspicious skin lesions noted.     Breast Exam: Deferred as had Mammogram.    Pelvic Exam -  Normal external genitalia with no lesions. Normal vaginal mucosa with normal rugation and no discharge. Cervix with no visible lesions. No cervical motion tenderness. Uterus is normal sized with no masses. No adnexal tenderness or enlargement appreciated. Thin Prep Pap is obtained, vaginal swab is not obtained and specimen(s) sent to lab  Rectal: deferred    <ASSESSMENT and PLAN>  1. Pap smear for cervical cancer screening  - THINPREP PAP WITH HPV; Future    Discussed  breast self exam, mammography screening, feminine hygiene, use and side effects of HRT, menopause, osteoporosis, adequate intake of calcium and vitamin D, diet and exercise, Kegel's exercises   Follow-up in 1 years for next Gyn exam and 3 years for next Pap.   Next office visit for recheck of chronic medical conditions is due in 2 months

## 2019-04-19 DIAGNOSIS — Z12.4 PAP SMEAR FOR CERVICAL CANCER SCREENING: ICD-10-CM

## 2019-04-19 LAB
CYTOLOGY REG CYTOL: NORMAL
HPV HR 12 DNA CVX QL NAA+PROBE: NEGATIVE
HPV16 DNA SPEC QL NAA+PROBE: NEGATIVE
HPV18 DNA SPEC QL NAA+PROBE: NEGATIVE
SPECIMEN SOURCE: NORMAL

## 2019-05-21 NOTE — TELEPHONE ENCOUNTER
Was the patient seen in the last year in this department? Yes    Does patient have an active prescription for medications requested? No     Received Request Via: Pharmacy      Pt met protocol?: Yes    LAST OV 04/17/2019      Lab Results  Component Value Date/Time   HBA1C 5.5 02/15/2019 1006       Lab Results  Component Value Date/Time   AVGLUC 111 02/15/2019 1006       Lab Results  Component Value Date/Time   CHOLSTRLTOT 164 02/15/2019 1006       Lab Results  Component Value Date/Time   TRIGLYCERIDE 135 02/15/2019 1006     No components found for: HLD    Lab Results  Component Value Date/Time   LDL 94 02/15/2019 1006

## 2019-05-22 RX ORDER — METFORMIN HYDROCHLORIDE 500 MG/1
TABLET, EXTENDED RELEASE ORAL
Qty: 360 TAB | Refills: 0 | Status: SHIPPED | OUTPATIENT
Start: 2019-05-22 | End: 2020-02-06 | Stop reason: SDUPTHER

## 2019-06-14 ENCOUNTER — OFFICE VISIT (OUTPATIENT)
Dept: MEDICAL GROUP | Facility: PHYSICIAN GROUP | Age: 49
End: 2019-06-14
Payer: COMMERCIAL

## 2019-06-14 VITALS
HEART RATE: 74 BPM | TEMPERATURE: 97.3 F | BODY MASS INDEX: 35.34 KG/M2 | SYSTOLIC BLOOD PRESSURE: 116 MMHG | OXYGEN SATURATION: 95 % | DIASTOLIC BLOOD PRESSURE: 68 MMHG | WEIGHT: 207 LBS | HEIGHT: 64 IN

## 2019-06-14 DIAGNOSIS — E11.9 CONTROLLED TYPE 2 DIABETES MELLITUS WITHOUT COMPLICATION, WITHOUT LONG-TERM CURRENT USE OF INSULIN (HCC): ICD-10-CM

## 2019-06-14 DIAGNOSIS — Z23 NEED FOR VACCINATION: ICD-10-CM

## 2019-06-14 DIAGNOSIS — B35.1 ONYCHOMYCOSIS: ICD-10-CM

## 2019-06-14 DIAGNOSIS — E66.9 CLASS 2 OBESITY WITH BODY MASS INDEX (BMI) OF 35.0 TO 35.9 IN ADULT, UNSPECIFIED OBESITY TYPE, UNSPECIFIED WHETHER SERIOUS COMORBIDITY PRESENT: ICD-10-CM

## 2019-06-14 DIAGNOSIS — E66.9 OBESITY (BMI 30-39.9): ICD-10-CM

## 2019-06-14 PROCEDURE — 90472 IMMUNIZATION ADMIN EACH ADD: CPT | Performed by: INTERNAL MEDICINE

## 2019-06-14 PROCEDURE — 99214 OFFICE O/P EST MOD 30 MIN: CPT | Mod: 25 | Performed by: INTERNAL MEDICINE

## 2019-06-14 PROCEDURE — 90746 HEPB VACCINE 3 DOSE ADULT IM: CPT | Performed by: INTERNAL MEDICINE

## 2019-06-14 PROCEDURE — 90471 IMMUNIZATION ADMIN: CPT | Performed by: INTERNAL MEDICINE

## 2019-06-14 PROCEDURE — 90732 PPSV23 VACC 2 YRS+ SUBQ/IM: CPT | Performed by: INTERNAL MEDICINE

## 2019-06-14 RX ORDER — TERBINAFINE HYDROCHLORIDE 250 MG/1
250 TABLET ORAL DAILY
Qty: 90 TAB | Refills: 0 | Status: SHIPPED | OUTPATIENT
Start: 2019-06-14 | End: 2020-02-06

## 2019-06-14 NOTE — ASSESSMENT & PLAN NOTE
This is a new problem started since more than 3 weeks back when it was covered by a artificial nail gel and as it fell off 2 days back, patient noticed it since 2 days.  It is limited to her left middle finger spreading more than two thirds of the nail bed.

## 2019-06-14 NOTE — PROGRESS NOTES
CC: Follow-up visit, nail fungus.    HISTORY OF PRESENT ILLNESS: Patient is a 48 y.o. female established patient who presents today to discuss on medical conditions as mentioned in HPI below.    Health Maintenance: Due for retinal eye exam which patient says she has gotten it at within last 1 year at ophthalmologist in outside facility which was normal, due for pneumonia vaccine and hepatitis B vaccine okay to get in the office today.    Onychomycosis  This is a new problem started since more than 3 weeks back when it was covered by a artificial nail gel and as it fell off 2 days back, patient noticed it since 2 days.  It is limited to her left middle finger spreading more than two thirds of the nail bed.      PHQ score 0, BMI > 35 , former tobacco, no fall injuries.    Patient Active Problem List    Diagnosis Date Noted   • Onychomycosis 06/14/2019   • Left ear pain 03/27/2019   • Obesity (BMI 35.0-39.9 without comorbidity) ([facility]) 03/27/2019   • Positive SUMMER (antinuclear antibody) 12/26/2018   • Obesity (BMI 30-39.9) 03/09/2018   • Nonalcoholic fatty liver disease 03/09/2018   • Controlled type 2 diabetes mellitus without complication, without long-term current use of insulin ([facility]) 04/27/2016   • Vitamin D deficiency 04/27/2016   • Rosacea 04/27/2016   • Hyperlipidemia associated with type 2 diabetes mellitus ([facility]) 03/01/2012   • PCOS (polycystic ovarian syndrome) 02/22/2012   • Hypertriglyceridemia 03/17/2010      Allergies:Bactrim [sulfamethoxazole w-trimethoprim]    Current Outpatient Prescriptions   Medication Sig Dispense Refill   • terbinafine (LAMISIL) 250 MG Tab Take 1 Tab by mouth every day. 90 Tab 0   • metFORMIN ER (GLUCOPHAGE XR) 500 MG TABLET SR 24 HR TAKE 2 TABLETS BY MOUTH TWICE A  Tab 0   • atorvastatin (LIPITOR) 20 MG Tab TAKE 1 TAB BY MOUTH EVERY DAY. 90 Tab 0   • B Complex Vitamins (VITAMIN B COMPLEX PO) Take  by mouth.     • metronidazole (METROCREAM) 0.75 % cream Apply  to affected  area(s) 2 times a day.     • Cholecalciferol (VITAMIN D) 2000 UNITS Cap Take  by mouth.     • Multiple Vitamin (DAILY VITAMINS PO) Take  by mouth.     • ibuprofen (MOTRIN) 200 MG Tab Take 200 mg by mouth every 6 hours as needed.     • fluticasone (FLONASE) 50 MCG/ACT nasal spray Spray 1 Spray in nose 2 times a day. Each Nostril 1 Bottle 3     No current facility-administered medications for this visit.        Social History   Substance Use Topics   • Smoking status: Former Smoker     Packs/day: 0.25     Years: 1.00     Types: Cigarettes     Quit date: 1/1/1991   • Smokeless tobacco: Never Used   • Alcohol use No      Comment: Muslim prohibits     Social History     Social History Narrative   • No narrative on file       Family History   Problem Relation Age of Onset   • Diabetes Mother    • Heart Disease Father    • Hypertension Father    • Hyperlipidemia Father    • Diabetes Brother    • Heart Disease Brother    • Hypertension Brother    • Hyperlipidemia Brother    • Diabetes Maternal Aunt    • Diabetes Maternal Uncle    • Cancer Maternal Grandmother         Cervical/ovarian   • Diabetes Maternal Grandfather    • Heart Disease Maternal Grandfather    • Hypertension Maternal Grandfather    • Hyperlipidemia Maternal Grandfather    • Stroke Maternal Grandfather    • Stroke Paternal Grandmother    • Hyperlipidemia Paternal Grandmother    • Hypertension Paternal Grandmother    • Heart Disease Paternal Grandmother    • Heart Disease Paternal Grandfather    • Hypertension Paternal Grandfather    • Hyperlipidemia Paternal Grandfather         ROS:     - Constitutional:  Negative for fever, chills, unexpected weight change, and fatigue/generalized weakness.    - HEENT:  Negative for headaches, vision changes, hearing changes, ear pain, ear discharge, rhinorrhea, sinus congestion, sore throat, and neck pain.      - Respiratory: Negative for cough, sputum production, chest congestion, dyspnea, wheezing, and crackles.      -  "Cardiovascular: Negative for chest pain, palpitations, orthopnea, and bilateral lower extremity edema.     - Gastrointestinal: Negative for heartburn, nausea, vomiting, abdominal pain, hematochezia, melena, diarrhea, constipation, and greasy/foul-smelling stools.     - Genitourinary: Negative for dysuria, polyuria, hematuria, pyuria, urinary urgency, and urinary incontinence.     - Musculoskeletal: Negative for myalgias, back pain, and joint pain.     - Skin: As mentioned in HPI above negative for rash, itching, cyanotic skin color change.     - Neurological: Negative for dizziness, tingling, tremors, focal sensory deficit, focal weakness and headaches.     - Endo/Heme/Allergies: Does not bruise/bleed easily.     - Psychiatric/Behavioral: Negative for depression, suicidal/homicidal ideation and memory loss.      Last lab work in February 2019 reviewed and discussed with patient.    Exam:    /68 (BP Location: Right arm, Patient Position: Sitting, BP Cuff Size: Large adult)   Pulse 74   Temp 36.3 °C (97.3 °F) (Temporal)   Ht 1.626 m (5' 4\")   Wt 93.9 kg (207 lb)   SpO2 95%  Body mass index is 35.53 kg/m².    General:  Well nourished, well developed female in NAD  Head is grossly normal.  Neck: Supple without JVD or bruit. Thyroid is not enlarged.  Pulmonary: Clear to ausculation and percussion.  Normal effort. No rales, ronchi, or wheezing.  Cardiovascular: Regular rate and rhythm without murmur. Carotid and radial pulses are intact and equal bilaterally.  Extremities: no clubbing, cyanosis, or edema.  Nasal exam : Positive for onychomycosis on the left middle finger.    Please note that this dictation was created using voice recognition software. I have made every reasonable attempt to correct obvious errors, but I expect that there are errors of grammar and possibly content that I did not discover before finalizing the note.    Assessment/Plan:  1. Onychomycosis  New problem, as mentioned in HPI above my " physical exam findings we will start her on terbinafine 250 mg for 3 months.  Discussed with the patient regarding possible side effects of elevated LFTs which patient understood and agreed with the risks.  Last LFTs in July 2018 within normal limits.  We will check a CMP in July 2019 Future orders given.  - terbinafine (LAMISIL) 250 MG Tab; Take 1 Tab by mouth every day.  Dispense: 90 Tab; Refill: 0  - Comp Metabolic Panel; Future    2. Controlled type 2 diabetes mellitus without complication, without long-term current use of insulin (HCC)  Well-controlled, continue current metformin 500 mg twice daily.  - HEMOGLOBIN A1C; Future    3. Need for vaccination  - PneumoVax PPV23 =>1yo  - Hepatitis B Vaccine Adult IM    4. Obesity (BMI 30-39.9)  5. Class 2 obesity with body mass index (BMI) of 35.0 to 35.9 in adult, unspecified obesity type, unspecified whether serious comorbidity present  Patient identified as elevated BMI.

## 2019-09-04 ENCOUNTER — HOSPITAL ENCOUNTER (OUTPATIENT)
Dept: LAB | Facility: MEDICAL CENTER | Age: 49
End: 2019-09-04
Attending: INTERNAL MEDICINE
Payer: COMMERCIAL

## 2019-09-04 DIAGNOSIS — B35.1 ONYCHOMYCOSIS: ICD-10-CM

## 2019-09-04 DIAGNOSIS — E11.9 CONTROLLED TYPE 2 DIABETES MELLITUS WITHOUT COMPLICATION, WITHOUT LONG-TERM CURRENT USE OF INSULIN (HCC): ICD-10-CM

## 2019-09-04 LAB
ALBUMIN SERPL BCP-MCNC: 3.9 G/DL (ref 3.2–4.9)
ALBUMIN/GLOB SERPL: 1.2 G/DL
ALP SERPL-CCNC: 46 U/L (ref 30–99)
ALT SERPL-CCNC: 22 U/L (ref 2–50)
ANION GAP SERPL CALC-SCNC: 10 MMOL/L (ref 0–11.9)
AST SERPL-CCNC: 17 U/L (ref 12–45)
BILIRUB SERPL-MCNC: 0.3 MG/DL (ref 0.1–1.5)
BUN SERPL-MCNC: 21 MG/DL (ref 8–22)
CALCIUM SERPL-MCNC: 9.2 MG/DL (ref 8.5–10.5)
CHLORIDE SERPL-SCNC: 105 MMOL/L (ref 96–112)
CO2 SERPL-SCNC: 23 MMOL/L (ref 20–33)
CREAT SERPL-MCNC: 0.73 MG/DL (ref 0.5–1.4)
EST. AVERAGE GLUCOSE BLD GHB EST-MCNC: 111 MG/DL
FASTING STATUS PATIENT QL REPORTED: NORMAL
GLOBULIN SER CALC-MCNC: 3.3 G/DL (ref 1.9–3.5)
GLUCOSE SERPL-MCNC: 105 MG/DL (ref 65–99)
HBA1C MFR BLD: 5.5 % (ref 0–5.6)
POTASSIUM SERPL-SCNC: 4 MMOL/L (ref 3.6–5.5)
PROT SERPL-MCNC: 7.2 G/DL (ref 6–8.2)
SODIUM SERPL-SCNC: 138 MMOL/L (ref 135–145)

## 2019-09-04 PROCEDURE — 80053 COMPREHEN METABOLIC PANEL: CPT

## 2019-09-04 PROCEDURE — 83036 HEMOGLOBIN GLYCOSYLATED A1C: CPT

## 2019-09-04 PROCEDURE — 36415 COLL VENOUS BLD VENIPUNCTURE: CPT

## 2019-10-01 ENCOUNTER — OFFICE VISIT (OUTPATIENT)
Dept: MEDICAL GROUP | Facility: PHYSICIAN GROUP | Age: 49
End: 2019-10-01
Payer: COMMERCIAL

## 2019-10-01 VITALS
OXYGEN SATURATION: 96 % | WEIGHT: 219 LBS | TEMPERATURE: 97.8 F | DIASTOLIC BLOOD PRESSURE: 70 MMHG | SYSTOLIC BLOOD PRESSURE: 128 MMHG | BODY MASS INDEX: 37.39 KG/M2 | HEART RATE: 62 BPM | HEIGHT: 64 IN

## 2019-10-01 DIAGNOSIS — E66.9 CLASS 2 OBESITY WITH BODY MASS INDEX (BMI) OF 37.0 TO 37.9 IN ADULT, UNSPECIFIED OBESITY TYPE, UNSPECIFIED WHETHER SERIOUS COMORBIDITY PRESENT: ICD-10-CM

## 2019-10-01 DIAGNOSIS — E78.5 HYPERLIPIDEMIA ASSOCIATED WITH TYPE 2 DIABETES MELLITUS (HCC): ICD-10-CM

## 2019-10-01 DIAGNOSIS — R63.5 WEIGHT GAIN: ICD-10-CM

## 2019-10-01 DIAGNOSIS — E11.9 CONTROLLED TYPE 2 DIABETES MELLITUS WITHOUT COMPLICATION, WITHOUT LONG-TERM CURRENT USE OF INSULIN (HCC): ICD-10-CM

## 2019-10-01 DIAGNOSIS — E66.9 OBESITY (BMI 30-39.9): ICD-10-CM

## 2019-10-01 DIAGNOSIS — Z23 NEED FOR VACCINATION: ICD-10-CM

## 2019-10-01 DIAGNOSIS — E11.69 HYPERLIPIDEMIA ASSOCIATED WITH TYPE 2 DIABETES MELLITUS (HCC): ICD-10-CM

## 2019-10-01 DIAGNOSIS — Z12.31 ENCOUNTER FOR SCREENING MAMMOGRAM FOR BREAST CANCER: ICD-10-CM

## 2019-10-01 DIAGNOSIS — N92.4 EXCESSIVE BLEEDING IN PREMENOPAUSAL PERIOD: ICD-10-CM

## 2019-10-01 PROCEDURE — 99214 OFFICE O/P EST MOD 30 MIN: CPT | Mod: 25 | Performed by: INTERNAL MEDICINE

## 2019-10-01 PROCEDURE — 90471 IMMUNIZATION ADMIN: CPT | Performed by: INTERNAL MEDICINE

## 2019-10-01 PROCEDURE — 90472 IMMUNIZATION ADMIN EACH ADD: CPT | Performed by: INTERNAL MEDICINE

## 2019-10-01 PROCEDURE — 90746 HEPB VACCINE 3 DOSE ADULT IM: CPT | Performed by: INTERNAL MEDICINE

## 2019-10-01 PROCEDURE — 90686 IIV4 VACC NO PRSV 0.5 ML IM: CPT | Performed by: INTERNAL MEDICINE

## 2019-10-01 NOTE — ASSESSMENT & PLAN NOTE
This is a chronic health problem that is well controlled with current medications and lifestyle measures.  Recent HbA1c in September 2019 at 5.5%.  Currently on metformin thousand milligrams twice daily.

## 2019-10-01 NOTE — ASSESSMENT & PLAN NOTE
This is a new problem. Pt says that she is following weight watchers and lost 50 lbs from 250 to 200 lbs since 3 yrs and in 06/2019 onwards again putting up the weight.

## 2019-10-01 NOTE — ASSESSMENT & PLAN NOTE
This is a chronic health problem that is well controlled with current medications and lifestyle measures.  Recent lipid panel in February 2019 within normal limits, patient is currently on atorvastatin 20 mg nightly.

## 2019-10-02 ENCOUNTER — PATIENT MESSAGE (OUTPATIENT)
Dept: HEALTH INFORMATION MANAGEMENT | Facility: OTHER | Age: 49
End: 2019-10-02

## 2019-10-02 NOTE — PROGRESS NOTES
CC: Follow-up visit, weight gain.    HISTORY OF PRESENT ILLNESS: Patient is a 48 y.o. female established patient who presents today to discuss on medical conditions as mentioned in HPI below.    Health Maintenance: Due for flu vaccine, hepatitis B vaccine okay to get in the office today, due for mammogram okay to get the orders today.    Controlled type 2 diabetes mellitus without complication, without long-term current use of insulin ([facility])  This is a chronic health problem that is well controlled with current medications and lifestyle measures.  Recent HbA1c in September 2019 at 5.5%.  Currently on metformin thousand milligrams twice daily.    Hyperlipidemia associated with type 2 diabetes mellitus  This is a chronic health problem that is well controlled with current medications and lifestyle measures.  Recent lipid panel in February 2019 within normal limits, patient is currently on atorvastatin 20 mg nightly.    Weight gain  This is a new problem. Pt says that she is following weight watchers and lost 50 lbs from 250 to 200 lbs since 3 yrs and in 06/2019 onwards again putting up the weight.      PHQ score 0, BMI > 37 , no tobacco, no fall injuries.    Patient Active Problem List    Diagnosis Date Noted   • Weight gain 10/01/2019   • Onychomycosis 06/14/2019   • Left ear pain 03/27/2019   • Obesity (BMI 35.0-39.9 without comorbidity) ([facility]) 03/27/2019   • Positive SUMMER (antinuclear antibody) 12/26/2018   • Obesity (BMI 30-39.9) 03/09/2018   • Nonalcoholic fatty liver disease 03/09/2018   • Controlled type 2 diabetes mellitus without complication, without long-term current use of insulin ([facility]) 04/27/2016   • Vitamin D deficiency 04/27/2016   • Rosacea 04/27/2016   • Hyperlipidemia associated with type 2 diabetes mellitus ([facility]) 03/01/2012   • PCOS (polycystic ovarian syndrome) 02/22/2012   • Hypertriglyceridemia 03/17/2010      Allergies:Bactrim [sulfamethoxazole w-trimethoprim]    Current Outpatient  Medications   Medication Sig Dispense Refill   • terbinafine (LAMISIL) 250 MG Tab Take 1 Tab by mouth every day. 90 Tab 0   • metFORMIN ER (GLUCOPHAGE XR) 500 MG TABLET SR 24 HR TAKE 2 TABLETS BY MOUTH TWICE A  Tab 0   • atorvastatin (LIPITOR) 20 MG Tab TAKE 1 TAB BY MOUTH EVERY DAY. 90 Tab 0   • B Complex Vitamins (VITAMIN B COMPLEX PO) Take  by mouth.     • ibuprofen (MOTRIN) 200 MG Tab Take 200 mg by mouth every 6 hours as needed.     • metronidazole (METROCREAM) 0.75 % cream Apply  to affected area(s) 2 times a day.     • fluticasone (FLONASE) 50 MCG/ACT nasal spray Spray 1 Spray in nose 2 times a day. Each Nostril 1 Bottle 3   • Cholecalciferol (VITAMIN D) 2000 UNITS Cap Take  by mouth.     • Multiple Vitamin (DAILY VITAMINS PO) Take  by mouth.       No current facility-administered medications for this visit.        Social History     Tobacco Use   • Smoking status: Former Smoker     Packs/day: 0.25     Years: 1.00     Pack years: 0.25     Types: Cigarettes     Last attempt to quit: 1991     Years since quittin.7   • Smokeless tobacco: Never Used   Substance Use Topics   • Alcohol use: No     Alcohol/week: 0.0 oz     Comment: Anabaptism prohibits   • Drug use: No     Social History     Social History Narrative   • Not on file       Family History   Problem Relation Age of Onset   • Diabetes Mother    • Heart Disease Father    • Hypertension Father    • Hyperlipidemia Father    • Diabetes Brother    • Heart Disease Brother    • Hypertension Brother    • Hyperlipidemia Brother    • Diabetes Maternal Aunt    • Diabetes Maternal Uncle    • Cancer Maternal Grandmother         Cervical/ovarian   • Diabetes Maternal Grandfather    • Heart Disease Maternal Grandfather    • Hypertension Maternal Grandfather    • Hyperlipidemia Maternal Grandfather    • Stroke Maternal Grandfather    • Stroke Paternal Grandmother    • Hyperlipidemia Paternal Grandmother    • Hypertension Paternal Grandmother    • Heart  "Disease Paternal Grandmother    • Heart Disease Paternal Grandfather    • Hypertension Paternal Grandfather    • Hyperlipidemia Paternal Grandfather         ROS:     - Constitutional:  Negative for fever, chills and fatigue/generalized weakness.    - HEENT:  Negative for headaches, vision changes, hearing changes, ear pain, ear discharge, rhinorrhea, sinus congestion, sore throat, and neck pain.      - Respiratory: Negative for cough, sputum production, chest congestion, dyspnea, wheezing, and crackles.      - Cardiovascular: Negative for chest pain, palpitations, orthopnea, and bilateral lower extremity edema.     - Gastrointestinal: Negative for heartburn, nausea, vomiting, abdominal pain, hematochezia, melena, diarrhea, constipation, and greasy/foul-smelling stools.     - Genitourinary: Negative for dysuria, polyuria, hematuria, pyuria, urinary urgency, and urinary incontinence.     - Musculoskeletal: Negative for myalgias, back pain, and joint pain.     - Skin: Negative for rash, itching, cyanotic skin color change.     - Neurological: Negative for dizziness, tingling, tremors, focal sensory deficit, focal weakness and headaches.     - Endo/Heme/Allergies: Does not bruise/bleed easily.     - Psychiatric/Behavioral: Negative for depression, suicidal/homicidal ideation and memory loss.        Last lab work in September 2019 reviewed and discussed with the patient.    Exam:    /70 (BP Location: Right arm, Patient Position: Sitting, BP Cuff Size: Adult)   Pulse 62   Temp 36.6 °C (97.8 °F) (Temporal)   Ht 1.626 m (5' 4\")   Wt 99.3 kg (219 lb)   SpO2 96%  Body mass index is 37.59 kg/m².    General:  Well nourished, well developed female in NAD  Head is grossly normal.  Neck: Supple without JVD or bruit. Thyroid is not enlarged.  Pulmonary: Clear to ausculation and percussion.  Normal effort. No rales, ronchi, or wheezing.  Cardiovascular: Regular rate and rhythm without murmur. Carotid and radial pulses are " intact and equal bilaterally.  Extremities: no clubbing, cyanosis, or edema.    Please note that this dictation was created using voice recognition software. I have made every reasonable attempt to correct obvious errors, but I expect that there are errors of grammar and possibly content that I did not discover before finalizing the note.    Assessment/Plan:  1. Controlled type 2 diabetes mellitus without complication, without long-term current use of insulin (HCC)  Well-controlled, continue current metformin as mentioned in HPI above.    2. Hyperlipidemia associated with type 2 diabetes mellitus (HCC)  Well-controlled, continue current atorvastatin 20 mg nightly.    3. Weight gain  4. Excessive bleeding in premenopausal period  New problem, as mentioned in HPI above my physical exam findings patient is been unable to control herself on junk foods occasionally and causing him to regain back the weight.  Given her excessive premenopausal bleeding episodes will check for thyroid function test and treat if abnormal.  Have placed referral to nutrition services for a better calorie count on her intake.  - REFERRAL TO NUTRITION SERVICES  - TSH WITH REFLEX TO FT4; Future    5. Obesity (BMI 30-39.9)  6. Class 2 obesity with body mass index (BMI) of 37.0 to 37.9 in adult, unspecified obesity type, unspecified whether serious comorbidity present  Pt educated on the increase of morbidity and mortality associated with excess weight including DM, Heart Disease, HTN, stroke, and sleep apnea.  Pt advised weight loss of 5% through reduced calorie, low fat diet and 150 mins of exercise a week  Recommend obesity clinic if patient is unsuccessful with weight loss  - REFERRAL TO NUTRITION SERVICES  - TSH WITH REFLEX TO FT4; Future    7. Need for vaccination  - Influenza Vaccine Quad Injection (PF)  - Hepatitis B Vaccine Adult IM    8. Encounter for screening mammogram for breast cancer  - MA-SCREENING MAMMO BILAT W/TOMOSYNTHESIS W/CAD;  Future

## 2019-12-02 ENCOUNTER — HOSPITAL ENCOUNTER (OUTPATIENT)
Dept: RADIOLOGY | Facility: MEDICAL CENTER | Age: 49
End: 2019-12-02
Attending: INTERNAL MEDICINE
Payer: COMMERCIAL

## 2019-12-02 DIAGNOSIS — Z12.31 ENCOUNTER FOR SCREENING MAMMOGRAM FOR BREAST CANCER: ICD-10-CM

## 2019-12-02 PROCEDURE — 77063 BREAST TOMOSYNTHESIS BI: CPT

## 2019-12-27 ENCOUNTER — HOSPITAL ENCOUNTER (OUTPATIENT)
Dept: LAB | Facility: MEDICAL CENTER | Age: 49
End: 2019-12-27
Attending: INTERNAL MEDICINE
Payer: COMMERCIAL

## 2019-12-27 DIAGNOSIS — R63.5 WEIGHT GAIN: ICD-10-CM

## 2019-12-27 DIAGNOSIS — E66.9 CLASS 2 OBESITY WITH BODY MASS INDEX (BMI) OF 37.0 TO 37.9 IN ADULT, UNSPECIFIED OBESITY TYPE, UNSPECIFIED WHETHER SERIOUS COMORBIDITY PRESENT: ICD-10-CM

## 2019-12-27 DIAGNOSIS — N92.4 EXCESSIVE BLEEDING IN PREMENOPAUSAL PERIOD: ICD-10-CM

## 2019-12-27 LAB — TSH SERPL DL<=0.005 MIU/L-ACNC: 1.74 UIU/ML (ref 0.38–5.33)

## 2019-12-27 PROCEDURE — 84443 ASSAY THYROID STIM HORMONE: CPT

## 2019-12-27 PROCEDURE — 36415 COLL VENOUS BLD VENIPUNCTURE: CPT

## 2020-02-06 ENCOUNTER — OFFICE VISIT (OUTPATIENT)
Dept: MEDICAL GROUP | Facility: PHYSICIAN GROUP | Age: 50
End: 2020-02-06
Payer: COMMERCIAL

## 2020-02-06 VITALS
OXYGEN SATURATION: 93 % | DIASTOLIC BLOOD PRESSURE: 72 MMHG | HEIGHT: 64 IN | HEART RATE: 63 BPM | SYSTOLIC BLOOD PRESSURE: 120 MMHG | WEIGHT: 232 LBS | BODY MASS INDEX: 39.61 KG/M2 | TEMPERATURE: 98.1 F

## 2020-02-06 DIAGNOSIS — E11.9 CONTROLLED TYPE 2 DIABETES MELLITUS WITHOUT COMPLICATION, WITHOUT LONG-TERM CURRENT USE OF INSULIN (HCC): ICD-10-CM

## 2020-02-06 DIAGNOSIS — Z23 NEED FOR VACCINATION: ICD-10-CM

## 2020-02-06 DIAGNOSIS — N94.6 DYSMENORRHEA: ICD-10-CM

## 2020-02-06 DIAGNOSIS — N92.4 ABNORMAL PERIMENOPAUSAL BLEEDING: ICD-10-CM

## 2020-02-06 DIAGNOSIS — S06.0X0A CONCUSSION WITHOUT LOSS OF CONSCIOUSNESS, INITIAL ENCOUNTER: ICD-10-CM

## 2020-02-06 PROCEDURE — 99214 OFFICE O/P EST MOD 30 MIN: CPT | Mod: 25 | Performed by: INTERNAL MEDICINE

## 2020-02-06 PROCEDURE — 90471 IMMUNIZATION ADMIN: CPT | Performed by: INTERNAL MEDICINE

## 2020-02-06 PROCEDURE — 90746 HEPB VACCINE 3 DOSE ADULT IM: CPT | Performed by: INTERNAL MEDICINE

## 2020-02-06 RX ORDER — FLUTICASONE PROPIONATE 50 MCG
1 SPRAY, SUSPENSION (ML) NASAL 2 TIMES DAILY
Qty: 1 BOTTLE | Refills: 3 | Status: SHIPPED | OUTPATIENT
Start: 2020-02-06 | End: 2021-06-02

## 2020-02-06 RX ORDER — IBUPROFEN 800 MG/1
800 TABLET ORAL EVERY 8 HOURS PRN
Qty: 30 TAB | Refills: 1 | Status: SHIPPED | OUTPATIENT
Start: 2020-02-06 | End: 2022-06-20

## 2020-02-06 RX ORDER — METFORMIN HYDROCHLORIDE 500 MG/1
1000 TABLET, EXTENDED RELEASE ORAL 2 TIMES DAILY
Qty: 180 TAB | Refills: 1 | Status: SHIPPED | OUTPATIENT
Start: 2020-02-06 | End: 2020-06-15

## 2020-02-06 RX ORDER — ATORVASTATIN CALCIUM 20 MG/1
20 TABLET, FILM COATED ORAL
Qty: 90 TAB | Refills: 1 | Status: SHIPPED | OUTPATIENT
Start: 2020-02-06 | End: 2021-06-02 | Stop reason: SDUPTHER

## 2020-02-06 ASSESSMENT — PATIENT HEALTH QUESTIONNAIRE - PHQ9: CLINICAL INTERPRETATION OF PHQ2 SCORE: 0

## 2020-02-07 NOTE — PROGRESS NOTES
CC: Follow-up visit, perimenopausal bleeding.    HISTORY OF PRESENT ILLNESS: Patient is a 49 y.o. female established patient who presents today to discuss on medical conditions as mentioned in HPI below.    Health Maintenance: Due for hepatitis B vaccine third dose okay to get in the office today, due for retinal eye check which was already done in October 2019 at [facility] will need to get the records.    Concussion without loss of consciousness  This is a new problem with accidental fall on a chair and bumped the head to the table. Denies LOC but episode of confusion.     Abnormal perimenopausal bleeding  This is a new problem started since last menstruation every month. Patient states that she has been on OCP's till 2 yrs back and has been getting menstruation till last 2 yrs back with the help of them managed by her OBGYN Rob at that time.She stopped having periods for few months and then restarted again.  Currently not following her anymore.     Current periods happening for 7 days which are regular and moderate to normal. Pt does have abdominal cramps with this.     Controlled type 2 diabetes mellitus without complication, without long-term current use of insulin (CMS-HCC)  This is a chronic health problem that is well controlled with current medications and lifestyle measures.  Last A1c in September 2019 at 5.5%, patient currently on metformin 500 mg twice daily.      PHQ score 0, BMI > 39 , no tobacco, no fall injuries.    Patient Active Problem List    Diagnosis Date Noted   • Concussion without loss of consciousness 02/06/2020   • Abnormal perimenopausal bleeding 02/06/2020   • Weight gain 10/01/2019   • Onychomycosis 06/14/2019   • Left ear pain 03/27/2019   • Obesity (BMI 35.0-39.9 without comorbidity) (HCC) 03/27/2019   • Positive SUMMER (antinuclear antibody) 12/26/2018   • Obesity (BMI 30-39.9) 03/09/2018   • Nonalcoholic fatty liver disease 03/09/2018   • Controlled type 2 diabetes mellitus  without complication, without long-term current use of insulin ([facility]) 2016   • Vitamin D deficiency 2016   • Rosacea 2016   • Hyperlipidemia associated with type 2 diabetes mellitus (HCC) 2012   • PCOS (polycystic ovarian syndrome) 2012   • Hypertriglyceridemia 2010      Allergies:Bactrim [sulfamethoxazole w-trimethoprim]    Current Outpatient Medications   Medication Sig Dispense Refill   • ibuprofen (MOTRIN) 800 MG Tab Take 1 Tab by mouth every 8 hours as needed. 30 Tab 1   • metFORMIN ER (GLUCOPHAGE XR) 500 MG TABLET SR 24 HR TAKE 2 TABLETS BY MOUTH TWICE A  Tab 0   • B Complex Vitamins (VITAMIN B COMPLEX PO) Take  by mouth.     • metronidazole (METROCREAM) 0.75 % cream Apply  to affected area(s) 2 times a day.     • fluticasone (FLONASE) 50 MCG/ACT nasal spray Spray 1 Spray in nose 2 times a day. Each Nostril 1 Bottle 3   • Cholecalciferol (VITAMIN D) 2000 UNITS Cap Take  by mouth.     • Multiple Vitamin (DAILY VITAMINS PO) Take  by mouth.     • atorvastatin (LIPITOR) 20 MG Tab TAKE 1 TAB BY MOUTH EVERY DAY. (Patient not taking: Reported on 2020) 90 Tab 0     No current facility-administered medications for this visit.        Social History     Tobacco Use   • Smoking status: Former Smoker     Packs/day: 0.25     Years: 1.00     Pack years: 0.25     Types: Cigarettes     Last attempt to quit: 1991     Years since quittin.1   • Smokeless tobacco: Never Used   Substance Use Topics   • Alcohol use: No     Alcohol/week: 0.0 oz     Comment: [employer] prohibits   • Drug use: No     Social History     Patient does not qualify to have social determinant information on file (likely too young).   Social History Narrative   • Not on file       Family History   Problem Relation Age of Onset   • Diabetes Mother    • Heart Disease Father    • Hypertension Father    • Hyperlipidemia Father    • Diabetes Brother    • Heart Disease Brother    • Hypertension Brother    •  Hyperlipidemia Brother    • Diabetes Maternal Aunt    • Diabetes Maternal Uncle    • Cancer Maternal Grandmother         Cervical/ovarian   • Diabetes Maternal Grandfather    • Heart Disease Maternal Grandfather    • Hypertension Maternal Grandfather    • Hyperlipidemia Maternal Grandfather    • Stroke Maternal Grandfather    • Stroke Paternal Grandmother    • Hyperlipidemia Paternal Grandmother    • Hypertension Paternal Grandmother    • Heart Disease Paternal Grandmother    • Heart Disease Paternal Grandfather    • Hypertension Paternal Grandfather    • Hyperlipidemia Paternal Grandfather         ROS:     - Constitutional:  Negative for fever, chills, unexpected weight change, and fatigue/generalized weakness.    - HEENT:  Negative for headaches, vision changes, hearing changes, ear pain, ear discharge, rhinorrhea, sinus congestion, sore throat, and neck pain.      - Respiratory: Negative for cough, sputum production, chest congestion, dyspnea, wheezing, and crackles.      - Cardiovascular: Negative for chest pain, palpitations, orthopnea, and bilateral lower extremity edema.     - Gastrointestinal: Negative for heartburn, nausea, vomiting, abdominal pain, hematochezia, melena, diarrhea, constipation, and greasy/foul-smelling stools.     - Genitourinary: Negative for dysuria, polyuria, hematuria, pyuria, urinary urgency, and urinary incontinence.     - Musculoskeletal: Negative for myalgias, back pain, and joint pain.     - Skin: Negative for rash, itching, cyanotic skin color change.     - Neurological: Negative for dizziness, tingling, tremors, focal sensory deficit, focal weakness and headaches.     - Endo/Heme/Allergies: Does not bruise/bleed easily.     - Psychiatric/Behavioral: Negative for depression, suicidal/homicidal ideation and memory loss.      Last lab work in September 2019 reviewed and discussed the patient.      Exam:    /72 (BP Location: Left arm, Patient Position: Sitting, BP Cuff Size:  "Large adult)   Pulse 63   Temp 36.7 °C (98.1 °F) (Temporal)   Ht 1.626 m (5' 4\")   Wt 105.2 kg (232 lb)   SpO2 93%  Body mass index is 39.82 kg/m².    General:  Well nourished, well developed female in NAD  Head is grossly normal.  There is no palpable hematoma per my physical exam on her left occipital region which patient states it has been there 3 weeks back.  No tenderness on palpation  Neck: Supple without JVD or bruit. Thyroid is not enlarged.  Pulmonary: Clear to ausculation and percussion.  Normal effort. No rales, ronchi, or wheezing.  Cardiovascular: Regular rate and rhythm without murmur. Carotid and radial pulses are intact and equal bilaterally.  Extremities: no clubbing, cyanosis, or edema.  Neurological exam : Grossly nonfocal, no motor or sensory deficits.    Diabetic foot exam  Monofilament testing with a 10 gram force: sensation: intact bilaterally  Visual Inspection: Feet without maceration, ulcers, or fissures.  Pedal pulses: intact bilaterally    Please note that this dictation was created using voice recognition software. I have made every reasonable attempt to correct obvious errors, but I expect that there are errors of grammar and possibly content that I did not discover before finalizing the note.    Assessment/Plan:  1. Abnormal perimenopausal bleeding  2. Dysmenorrhea  New problem, as mentioned in HPI were no physical exam findings will place a transvaginal ultrasound to make sure there is no uterine fibroid causing this problem.  To take ibuprofen 800 mg 3 times daily PRN for symptoms of dysmenorrhea.  - US-PELVIC TRANSVAGINAL ONLY; Future  - ibuprofen (MOTRIN) 800 MG Tab; Take 1 Tab by mouth every 8 hours as needed.  Dispense: 30 Tab; Refill: 1    3. Concussion without loss of consciousness, initial encounter  New problem, as mentioned in HPI were no physical exam findings patient does not have any new scalp hematoma palpable by me, to take PRN ibuprofen emphasized for any " postconcussion headaches.  As there is no neurological deficits at this time and no loss of consciousness I do not think she needs any imaging at this time.    4. Controlled type 2 diabetes mellitus without complication, without long-term current use of insulin (HCC)  Well-controlled, continue current metformin 500 mg BID  - Diabetic Monofilament Lower Extremity Exam    5. Need for vaccination  - Hepatitis B Vaccine Adult IM

## 2020-02-07 NOTE — ASSESSMENT & PLAN NOTE
This is a new problem started since last menstruation every month. Patient states that she has been on OCP's till 2 yrs back and has been getting menstruation till last 2 yrs back with the help of them managed by her OBALMA Rivas at that time.She stopped having periods for few months and then restarted again.  Currently not following her anymore.     Current periods happening for 7 days which are regular and moderate to normal. Pt does have abdominal cramps with this.

## 2020-02-07 NOTE — ASSESSMENT & PLAN NOTE
This is a chronic health problem that is well controlled with current medications and lifestyle measures.  Last A1c in September 2019 at 5.5%, patient currently on metformin 500 mg twice daily.

## 2020-02-07 NOTE — ASSESSMENT & PLAN NOTE
This is a new problem with accidental fall on a chair and bumped the head to the table. Denies LOC but episode of confusion.

## 2020-03-18 ENCOUNTER — HOSPITAL ENCOUNTER (OUTPATIENT)
Dept: RADIOLOGY | Facility: MEDICAL CENTER | Age: 50
End: 2020-03-18
Attending: INTERNAL MEDICINE
Payer: COMMERCIAL

## 2020-03-18 DIAGNOSIS — N92.4 ABNORMAL PERIMENOPAUSAL BLEEDING: ICD-10-CM

## 2020-03-18 PROCEDURE — 76830 TRANSVAGINAL US NON-OB: CPT

## 2020-04-14 ENCOUNTER — PATIENT MESSAGE (OUTPATIENT)
Dept: HEALTH INFORMATION MANAGEMENT | Facility: OTHER | Age: 50
End: 2020-04-14

## 2020-06-15 RX ORDER — METFORMIN HYDROCHLORIDE 500 MG/1
TABLET, EXTENDED RELEASE ORAL
Qty: 360 TAB | Refills: 0 | Status: SHIPPED | OUTPATIENT
Start: 2020-06-15 | End: 2020-09-18

## 2020-06-15 NOTE — TELEPHONE ENCOUNTER
Patients needs new PCP, Pt to make appt prior to more refills.  Last seen by PCP 02/6/2020. Will send 3 month(s) to the pharmacy.

## 2020-09-18 RX ORDER — METFORMIN HYDROCHLORIDE 500 MG/1
TABLET, EXTENDED RELEASE ORAL
Qty: 120 TAB | Refills: 0 | Status: SHIPPED | OUTPATIENT
Start: 2020-09-18 | End: 2020-10-15

## 2020-09-18 NOTE — TELEPHONE ENCOUNTER
Last seen by PCP 2/6/2020    Lab Results   Component Value Date/Time    HBA1C 5.5 09/04/2019 07:17 AM      Lab Results   Component Value Date/Time    MALBCRT see below 04/16/2019 03:46 PM    MICROALBUR <0.7 04/16/2019 03:46 PM      Lab Results   Component Value Date/Time    ALKPHOSPHAT 46 09/04/2019 07:17 AM    ASTSGOT 17 09/04/2019 07:17 AM    ALTSGPT 22 09/04/2019 07:17 AM    TBILIRUBIN 0.3 09/04/2019 07:17 AM        Will send 1 month(s) to the pharmacy.  Pt to make appt prior to more refills.

## 2021-02-02 RX ORDER — METFORMIN HYDROCHLORIDE 500 MG/1
TABLET, EXTENDED RELEASE ORAL
Qty: 120 TAB | Refills: 0 | OUTPATIENT
Start: 2021-02-02

## 2021-04-23 ENCOUNTER — TELEPHONE (OUTPATIENT)
Dept: HEALTH INFORMATION MANAGEMENT | Facility: OTHER | Age: 51
End: 2021-04-23

## 2021-04-23 DIAGNOSIS — E11.69 HYPERLIPIDEMIA ASSOCIATED WITH TYPE 2 DIABETES MELLITUS (HCC): ICD-10-CM

## 2021-04-23 DIAGNOSIS — E55.9 VITAMIN D DEFICIENCY: ICD-10-CM

## 2021-04-23 DIAGNOSIS — E78.5 HYPERLIPIDEMIA ASSOCIATED WITH TYPE 2 DIABETES MELLITUS (HCC): ICD-10-CM

## 2021-04-23 DIAGNOSIS — E11.9 CONTROLLED TYPE 2 DIABETES MELLITUS WITHOUT COMPLICATION, WITHOUT LONG-TERM CURRENT USE OF INSULIN (HCC): ICD-10-CM

## 2021-04-23 NOTE — TELEPHONE ENCOUNTER
1. Caller Name: AUBREY                        Call Back Number: [phone]      How would the patient prefer to be contacted with a response: Highfivehart message    2. SPECIFIC Action To Be Taken: Orders pending, please sign.    3. Diagnosis/Clinical Reason for Request: PT HAS [facility] [employer] [facility] SCHEDULED 6/2 AND WOULD LIKE TO HAVE LAB WORK DONE TO REVIEW AT TIME OF APPOINTMENT. PT IS REQUESTING:  Fasting Lipid Profile  A1C Screening  Urine Acr/ Microalbumin  Serum creatinine     4. Specialty & Provider Name/Lab/Imaging Location: [facility]    5. Is appointment scheduled for requested order/referral: yes -    Patient was not informed they will receive a return phone call from the office ONLY if there are any questions before processing their request. Advised to call back if they haven't received a call from the referral department in 5 days.

## 2021-05-18 ENCOUNTER — HOSPITAL ENCOUNTER (OUTPATIENT)
Dept: RADIOLOGY | Facility: MEDICAL CENTER | Age: 51
End: 2021-05-18
Attending: INTERNAL MEDICINE
Payer: COMMERCIAL

## 2021-05-18 DIAGNOSIS — Z12.31 VISIT FOR SCREENING MAMMOGRAM: ICD-10-CM

## 2021-05-18 PROCEDURE — 77063 BREAST TOMOSYNTHESIS BI: CPT

## 2021-05-19 ENCOUNTER — HOSPITAL ENCOUNTER (OUTPATIENT)
Dept: LAB | Facility: MEDICAL CENTER | Age: 51
End: 2021-05-19
Attending: NURSE PRACTITIONER
Payer: COMMERCIAL

## 2021-05-19 DIAGNOSIS — E11.9 CONTROLLED TYPE 2 DIABETES MELLITUS WITHOUT COMPLICATION, WITHOUT LONG-TERM CURRENT USE OF INSULIN (HCC): ICD-10-CM

## 2021-05-19 DIAGNOSIS — E78.5 HYPERLIPIDEMIA ASSOCIATED WITH TYPE 2 DIABETES MELLITUS (HCC): ICD-10-CM

## 2021-05-19 DIAGNOSIS — E55.9 VITAMIN D DEFICIENCY: ICD-10-CM

## 2021-05-19 DIAGNOSIS — E11.69 HYPERLIPIDEMIA ASSOCIATED WITH TYPE 2 DIABETES MELLITUS (HCC): ICD-10-CM

## 2021-05-19 LAB
ALBUMIN SERPL BCP-MCNC: 4 G/DL (ref 3.2–4.9)
ALBUMIN/GLOB SERPL: 1.2 G/DL
ALP SERPL-CCNC: 62 U/L (ref 30–99)
ALT SERPL-CCNC: 90 U/L (ref 2–50)
ANION GAP SERPL CALC-SCNC: 9 MMOL/L (ref 7–16)
AST SERPL-CCNC: 55 U/L (ref 12–45)
BILIRUB SERPL-MCNC: 0.4 MG/DL (ref 0.1–1.5)
BUN SERPL-MCNC: 17 MG/DL (ref 8–22)
CALCIUM SERPL-MCNC: 9.3 MG/DL (ref 8.5–10.5)
CHLORIDE SERPL-SCNC: 106 MMOL/L (ref 96–112)
CHOLEST SERPL-MCNC: 203 MG/DL (ref 100–199)
CO2 SERPL-SCNC: 22 MMOL/L (ref 20–33)
CREAT SERPL-MCNC: 0.69 MG/DL (ref 0.5–1.4)
CREAT UR-MCNC: 101.3 MG/DL
EST. AVERAGE GLUCOSE BLD GHB EST-MCNC: 171 MG/DL
FASTING STATUS PATIENT QL REPORTED: NORMAL
GLOBULIN SER CALC-MCNC: 3.4 G/DL (ref 1.9–3.5)
GLUCOSE SERPL-MCNC: 172 MG/DL (ref 65–99)
HBA1C MFR BLD: 7.6 % (ref 4–5.6)
HDLC SERPL-MCNC: 47 MG/DL
LDLC SERPL CALC-MCNC: 122 MG/DL
MICROALBUMIN UR-MCNC: 6.2 MG/DL
MICROALBUMIN/CREAT UR: 61 MG/G (ref 0–30)
POTASSIUM SERPL-SCNC: 4.4 MMOL/L (ref 3.6–5.5)
PROT SERPL-MCNC: 7.4 G/DL (ref 6–8.2)
SODIUM SERPL-SCNC: 137 MMOL/L (ref 135–145)
TRIGL SERPL-MCNC: 169 MG/DL (ref 0–149)

## 2021-05-19 PROCEDURE — 82306 VITAMIN D 25 HYDROXY: CPT

## 2021-05-19 PROCEDURE — 36415 COLL VENOUS BLD VENIPUNCTURE: CPT

## 2021-05-19 PROCEDURE — 83036 HEMOGLOBIN GLYCOSYLATED A1C: CPT

## 2021-05-19 PROCEDURE — 80061 LIPID PANEL: CPT

## 2021-05-19 PROCEDURE — 82570 ASSAY OF URINE CREATININE: CPT

## 2021-05-19 PROCEDURE — 80053 COMPREHEN METABOLIC PANEL: CPT

## 2021-05-19 PROCEDURE — 82043 UR ALBUMIN QUANTITATIVE: CPT

## 2021-05-21 LAB — 25(OH)D3 SERPL-MCNC: 21 NG/ML (ref 30–80)

## 2021-06-02 ENCOUNTER — OFFICE VISIT (OUTPATIENT)
Dept: MEDICAL GROUP | Facility: PHYSICIAN GROUP | Age: 51
End: 2021-06-02
Payer: COMMERCIAL

## 2021-06-02 VITALS
TEMPERATURE: 97.4 F | HEART RATE: 66 BPM | DIASTOLIC BLOOD PRESSURE: 86 MMHG | SYSTOLIC BLOOD PRESSURE: 126 MMHG | OXYGEN SATURATION: 96 % | HEIGHT: 64 IN | BODY MASS INDEX: 41.48 KG/M2 | WEIGHT: 243 LBS

## 2021-06-02 DIAGNOSIS — E11.65 TYPE 2 DIABETES MELLITUS WITH HYPERGLYCEMIA, WITHOUT LONG-TERM CURRENT USE OF INSULIN (HCC): ICD-10-CM

## 2021-06-02 DIAGNOSIS — L71.9 ROSACEA: ICD-10-CM

## 2021-06-02 DIAGNOSIS — E55.9 VITAMIN D DEFICIENCY: ICD-10-CM

## 2021-06-02 DIAGNOSIS — E11.69 HYPERLIPIDEMIA ASSOCIATED WITH TYPE 2 DIABETES MELLITUS (HCC): ICD-10-CM

## 2021-06-02 DIAGNOSIS — R79.89 ELEVATED LIVER FUNCTION TESTS: ICD-10-CM

## 2021-06-02 DIAGNOSIS — E78.5 HYPERLIPIDEMIA ASSOCIATED WITH TYPE 2 DIABETES MELLITUS (HCC): ICD-10-CM

## 2021-06-02 PROCEDURE — 99214 OFFICE O/P EST MOD 30 MIN: CPT | Performed by: NURSE PRACTITIONER

## 2021-06-02 RX ORDER — METFORMIN HYDROCHLORIDE 500 MG/1
1000 TABLET, EXTENDED RELEASE ORAL 2 TIMES DAILY
Qty: 360 TABLET | Refills: 0 | Status: SHIPPED | OUTPATIENT
Start: 2021-06-02 | End: 2021-08-31

## 2021-06-02 RX ORDER — ATORVASTATIN CALCIUM 20 MG/1
20 TABLET, FILM COATED ORAL
Qty: 90 TABLET | Refills: 0 | Status: SHIPPED | OUTPATIENT
Start: 2021-06-02 | End: 2021-08-31

## 2021-06-02 RX ORDER — DOXYCYCLINE HYCLATE 100 MG
100 TABLET ORAL 2 TIMES DAILY
COMMUNITY
End: 2021-12-08

## 2021-06-02 ASSESSMENT — PATIENT HEALTH QUESTIONNAIRE - PHQ9: CLINICAL INTERPRETATION OF PHQ2 SCORE: 0

## 2021-06-02 NOTE — ASSESSMENT & PLAN NOTE
This is a chronic condition.  Last vitamin D level 5/2021 was 21.  She is currently taking vitamin D supplementation of 2000 units daily.

## 2021-06-02 NOTE — ASSESSMENT & PLAN NOTE
This is a chronic condition.  She currently takes doxycycline 100 mg tablet daily.  If she does not take her rosacea, she does endorse formation of closed comedones to her nose.  Currently, her symptoms are well controlled with current treatment plan.

## 2021-06-02 NOTE — ASSESSMENT & PLAN NOTE
This is a chronic condition.  Current medications: Metformin XR 1000 mg nightly  Labs from 5/2021:   Last A1c: 7.6  Last Microalb/Cr ratio: Increased in ratio-61  Fasting sugars: 172  Last diabetic foot exam: Completed at next visit  Last retinal eye exam: 10/2020-will request records  ACEi/ARB?  N/A.    Statin?  Not at this time.   Aspirin?  No.   Concomitant HTN?  N/A.    She denies polyuria, polydipsia, weight loss, numbness or tingling, and blurred vision.  She does endorse weight gain of about 40 lbs over the last year.

## 2021-06-02 NOTE — ASSESSMENT & PLAN NOTE
This is a chronic condition.   Last lipid panel from 5/2021:  Total cholesterol 203  Triglycerides 169  HDL 47    Current medication:  None  Previous medication:  Atorvastatin 20 mg daily  Side effects:  None  -- She does endorse that she stopped taking atorvastatin around two years ago as she had lost weight and her cholesterol panel was good.

## 2021-06-02 NOTE — LETTER
[facility]  SAMY Manzano  [address]  [place] [address]  Fax: [phone]   Authorization for Release/Disclosure of   Protected Health Information   Name: AUBREY MARLOW : 1970 SSN: [id number]   Address: [address] [place] [address]  [place] [address] Phone:    [phone] (home) [phone] (work)   I authorize the entity listed below to release/disclose the PHI below to:   [employer] [facility]/SAMY Manzano and SAMY Manzano   Provider or Entity Name:     Address   City, State, Zip   Phone:      Fax:     Reason for request: continuity of care   Information to be released:    [  ] LAST COLONOSCOPY,  including any PATH REPORT and follow-up  [  ] LAST FIT/COLOGUARD RESULT [  ] LAST DEXA  [  ] LAST MAMMOGRAM  [  ] LAST PAP  [  ] LAST LABS [  ] RETINA EXAM REPORT  [  ] IMMUNIZATION RECORDS  [  ] Release all info      [  ] Check here and initial the line next to each item to release ALL health information INCLUDING  _____ Care and treatment for drug and / or alcohol abuse  _____ HIV testing, infection status, or AIDS  _____ Genetic Testing    DATES OF SERVICE OR TIME PERIOD TO BE DISCLOSED: _____________  I understand and acknowledge that:  * This Authorization may be revoked at any time by you in writing, except if your health information has already been used or disclosed.  * Your health information that will be used or disclosed as a result of you signing this authorization could be re-disclosed by the recipient. If this occurs, your re-disclosed health information may no longer be protected by State or Federal laws.  * You may refuse to sign this Authorization. Your refusal will not affect your ability to obtain treatment.  * This Authorization becomes effective upon signing and will  on (date) __________.      If no date is indicated, this Authorization will  one (1) year from the signature date.    Name: Aubrey Marlow    Signature:   Date:      6/2/2021       PLEASE FAX REQUESTED RECORDS BACK TO: [phone]

## 2021-06-02 NOTE — PROGRESS NOTES
Subjective  Chief Complaint  Establish care to manage her chronic conditions    History of Present Illness  Radha Marlow is a 50 y.o. female. This patient is here today to establish care.  Her prior PCP was Dr. Eladia Yates MD.    Rosacea  This is a chronic condition.  She currently takes doxycycline 100 mg tablet daily.  If she does not take her rosacea, she does endorse formation of closed comedones to her nose.  Currently, her symptoms are well controlled with current treatment plan.    Hyperlipidemia associated with type 2 diabetes mellitus  This is a chronic condition.   Last lipid panel from 5/2021:  Total cholesterol 203  Triglycerides 169  HDL 47    Current medication:  None  Previous medication:  Atorvastatin 20 mg daily  Side effects:  None  -- She does endorse that she stopped taking atorvastatin around two years ago as she had lost weight and her cholesterol panel was good.    Type 2 diabetes mellitus with hyperglycemia, without long-term current use of insulin (HCC)  This is a chronic condition.  Current medications: Metformin XR 1000 mg nightly  Labs from 5/2021:   Last A1c: 7.6  Last Microalb/Cr ratio: Increased in ratio-61  Fasting sugars: 172  Last diabetic foot exam: Completed at next visit  Last retinal eye exam: 10/2020-will request records  ACEi/ARB?  N/A.    Statin?  Not at this time.   Aspirin?  No.   Concomitant HTN?  N/A.    She denies polyuria, polydipsia, weight loss, numbness or tingling, and blurred vision.  She does endorse weight gain of about 40 lbs over the last year.      Vitamin D deficiency  This is a chronic condition.  Last vitamin D level 5/2021 was 21.  She is currently taking vitamin D supplementation of 2000 units daily.    Past Medical History    Allergies: Bactrim [sulfamethoxazole w-trimethoprim]  Past Medical History:   Diagnosis Date   • Allergy    • Anemia    • Arthritis    • Hyperlipidemia    • Hypertriglyceridemia 3/17/2010     History reviewed. No  pertinent surgical history.  Current Outpatient Medications Ordered in Epic   Medication Sig Dispense Refill   • doxycycline (VIBRAMYCIN) 100 MG Tab Take 100 mg by mouth 2 times a day.     • atorvastatin (LIPITOR) 20 MG Tab Take 1 tablet by mouth every day. TAKE 1 TAB BY MOUTH EVERY DAY. 90 tablet 0   • metFORMIN ER (GLUCOPHAGE XR) 500 MG TABLET SR 24 HR Take 2 Tablets by mouth 2 times a day. 360 tablet 0   • ibuprofen (MOTRIN) 800 MG Tab Take 1 Tab by mouth every 8 hours as needed. 30 Tab 1   • metronidazole (METROCREAM) 0.75 % cream Apply 5 mg to affected area(s) 2 times a day. 45 g 4   • Cholecalciferol (VITAMIN D) 2000 UNITS Cap Take  by mouth.     • Multiple Vitamin (DAILY VITAMINS PO) Take  by mouth.       No current Epic-ordered facility-administered medications on file.     Family History:    Family History   Problem Relation Age of Onset   • Diabetes Mother    • Heart Disease Father    • Hypertension Father    • Hyperlipidemia Father    • Diabetes Brother    • Heart Disease Brother    • Hypertension Brother    • Hyperlipidemia Brother    • Diabetes Maternal Aunt    • Diabetes Maternal Uncle    • Cancer Maternal Grandmother         Cervical/ovarian   • Diabetes Maternal Grandfather    • Heart Disease Maternal Grandfather    • Hypertension Maternal Grandfather    • Hyperlipidemia Maternal Grandfather    • Stroke Maternal Grandfather    • Stroke Paternal Grandmother    • Hyperlipidemia Paternal Grandmother    • Hypertension Paternal Grandmother    • Heart Disease Paternal Grandmother    • Heart Disease Paternal Grandfather    • Hypertension Paternal Grandfather    • Hyperlipidemia Paternal Grandfather       Personal/Social History:    Social History     Tobacco Use   • Smoking status: Former Smoker     Packs/day: 0.25     Years: 1.00     Pack years: 0.25     Types: Cigarettes     Quit date: 1991     Years since quittin.4   • Smokeless tobacco: Never Used   Vaping Use   • Vaping Use: Never used  "  Substance Use Topics   • Alcohol use: No     Alcohol/week: 0.0 oz     Comment: [employer] prohibits   • Drug use: No     Social History     Social History Narrative   • Not on file      Review of Systems:     General: Negative for fever/chills.    Eyes:  Negative for vision changes.   ENT:  Negative for hearing changes.    Respiratory:  Negative for cough and dyspnea.     Cardiovascular:  Negative for chest pain and palpitations.   Gastrointestinal:  Negative for nausea/vomiting.    Genitourinary:  Negative for dysuria.    Musculoskeletal:  Negative for myalgias.    Skin:  Negative for rash.    Neurological:  Negative for numbness/tingling.    Heme/Lymph:  Does not bruise/bleed easily.     Objective  Physical Exam:   /86 (BP Location: Left arm, Patient Position: Sitting, BP Cuff Size: Large adult)   Pulse 66   Temp 36.3 °C (97.4 °F) (Temporal)   Ht 1.626 m (5' 4\")   Wt 110 kg (243 lb)   SpO2 96%  Body mass index is 41.71 kg/m².  General:  Alert and oriented.  Well appearing.  NAD.  Head:  Normocephalic.   Eyes:  Eyes conjunctiva clear lids without ptosis.    ENT: Ears normal shape and contour.   Neck: Supple without JVD. No lymphadenopathy.  Pulmonary:  Normal effort.  Clear to ausculation without rales, ronchi, or wheezing.  Cardiovascular:  Regular rate and rhythm without murmur, rubs or gallop.  Radial pulses are intact and equal bilaterally.  Gastrointestinal: Abdomen soft, nontender, nondistended. Normal bowel sounds. Liver and spleen are not palpable.  Musculoskeletal:  No extremity cyanosis, clubbing, or edema.  Skin:  Warm and dry.  No obvious lesions.  Lymph: No cervical or supraclavicular lymph nodes are palpable.  Neurologic: Grossly intact.  Sensation intact.   Psych: Normal mood and affect. Alert and oriented x3. Judgment and insight is normal.    Diagnostic Testing/Findings:  Labs:    Results for AUBREY JARAMILLO (MRN [id number]) as of 6/2/2021 07:33   Ref. Range 5/19/2021 08:00   Sodium " Latest Ref Range: 135 - 145 mmol/L 137   Potassium Latest Ref Range: 3.6 - 5.5 mmol/L 4.4   Chloride Latest Ref Range: 96 - 112 mmol/L 106   Co2 Latest Ref Range: 20 - 33 mmol/L 22   Anion Gap Latest Ref Range: 7.0 - 16.0  9.0   Glucose Latest Ref Range: 65 - 99 mg/dL 172 (H)   Bun Latest Ref Range: 8 - 22 mg/dL 17   Creatinine Latest Ref Range: 0.50 - 1.40 mg/dL 0.69   GFR If  Latest Ref Range: >60 mL/min/1.73 m 2 >60   GFR If Non  Latest Ref Range: >60 mL/min/1.73 m 2 >60   Calcium Latest Ref Range: 8.5 - 10.5 mg/dL 9.3   AST(SGOT) Latest Ref Range: 12 - 45 U/L 55 (H)   ALT(SGPT) Latest Ref Range: 2 - 50 U/L 90 (H)   Alkaline Phosphatase Latest Ref Range: 30 - 99 U/L 62   Total Bilirubin Latest Ref Range: 0.1 - 1.5 mg/dL 0.4   Albumin Latest Ref Range: 3.2 - 4.9 g/dL 4.0   Total Protein Latest Ref Range: 6.0 - 8.2 g/dL 7.4   Globulin Latest Ref Range: 1.9 - 3.5 g/dL 3.4   A-G Ratio Latest Units: g/dL 1.2   Glycohemoglobin Latest Ref Range: 4.0 - 5.6 % 7.6 (H)   Estim. Avg Glu Latest Units: mg/dL 171   Cholesterol,Tot Latest Ref Range: 100 - 199 mg/dL 203 (H)   Triglycerides Latest Ref Range: 0 - 149 mg/dL 169 (H)   HDL Latest Ref Range: >=40 mg/dL 47   LDL Latest Ref Range: <100 mg/dL 122 (H)   Micro Alb Creat Ratio Latest Ref Range: 0 - 30 mg/g 61 (H)   Creatinine, Urine Latest Units: mg/dL 101.30   Microalbumin, Urine Random Latest Units: mg/dL 6.2   25-Hydroxy   Vitamin D 25 Latest Ref Range: 30 - 80 ng/mL 21 (L)     Assessment/Plan   1. Hyperlipidemia associated with type 2 diabetes mellitus (HCC)  This is a chronic condition, not controlled.   Notable elevations in total cholesterol, triglycerides, and LDL from last check about 1 year ago.  Discussed with patient that we will restart atorvastatin 20 mg daily.  Advised diet low in saturated fat, high in omega-3 fatty acids and soluble fiber, and engaging in physical activity at least 30 minutes a day most days of the week.  -  atorvastatin (LIPITOR) 20 MG Tab; Take 1 tablet by mouth every day. TAKE 1 TAB BY MOUTH EVERY DAY.  Dispense: 90 tablet; Refill: 0    2. Type 2 diabetes mellitus with hyperglycemia, without long-term current use of insulin (HCC)  This is a chronic condition, not controlled.  Notable elevation in A1c from 5.5 to 7.6.  Currently taking Metformin 1000 mg at night, and trying to incorporate back 500 mg in the morning.  Discussed with patient to take the Metformin twice a day for total of 1000 mg in the morning and 1000 mg at night.  Recheck labs again in 3 months time, including A1c and fasting glucose.  Complete diabetes monofilament exam at next visit.  She has had her retinal screening done 10/2020-will request records from Dr. Hernandez.  Advised diet low in carbohydrates and concentrated sugars.  - metFORMIN ER (GLUCOPHAGE XR) 500 MG TABLET SR 24 HR; Take 2 Tablets by mouth 2 times a day.  Dispense: 360 tablet; Refill: 0  - Comp Metabolic Panel; Future  - HEMOGLOBIN A1C; Future    3. Elevated liver function tests  Noted on lab review with patient.  She does have a history of fatty liver, which was biopsied a few years ago.  After weight loss, reports that her liver function tests are better.  Advised avoidance of Tylenol at this time, and advised weight loss.  Recheck liver function tests prior to next visit.  - Comp Metabolic Panel; Future    4. Vitamin D deficiency  This is a chronic condition, not controlled.  Advised to continue taking vitamin D supplementation daily.    5. Rosacea  This is a chronic condition, stable.  Continue with current treatment plan.    Health Maintenance: Completed    Return in about 3 months (around 9/2/2021), or if symptoms worsen or fail to improve, for follow up diabetes.    Patient verbalized understanding and agreed to plan of care.  We have discussed to contact me with needs via Vim[employer] or by phone if needed.      I have placed the above orders and discussed them with an approved  delegating provider.  The MA is performing the below orders under the direction of Dr. Be.    Please note that this dictation was created using voice recognition software. I have made every reasonable attempt to correct obvious errors, but I expect that there are errors of grammar and possibly content that I did not discover before finalizing the note.    KATHLEEN Manzano  [facility]

## 2021-07-07 ENCOUNTER — TELEMEDICINE (OUTPATIENT)
Dept: MEDICAL GROUP | Facility: PHYSICIAN GROUP | Age: 51
End: 2021-07-07
Payer: COMMERCIAL

## 2021-07-07 VITALS — BODY MASS INDEX: 40.46 KG/M2 | RESPIRATION RATE: 12 BRPM | HEIGHT: 64 IN | WEIGHT: 237 LBS | TEMPERATURE: 98 F

## 2021-07-07 DIAGNOSIS — B35.1 ONYCHOMYCOSIS: ICD-10-CM

## 2021-07-07 PROBLEM — L60.9 NAIL PROBLEM: Status: ACTIVE | Noted: 2021-07-07

## 2021-07-07 PROCEDURE — 99213 OFFICE O/P EST LOW 20 MIN: CPT | Mod: 95,CR | Performed by: NURSE PRACTITIONER

## 2021-07-07 RX ORDER — TERBINAFINE HYDROCHLORIDE 250 MG/1
250 TABLET ORAL DAILY
Qty: 90 TABLET | Refills: 0 | Status: SHIPPED | OUTPATIENT
Start: 2021-07-07 | End: 2021-12-08

## 2021-07-07 ASSESSMENT — PAIN SCALES - GENERAL: PAINLEVEL: NO PAIN

## 2021-07-07 NOTE — ASSESSMENT & PLAN NOTE
This is a new condition.  She reports that she does get her nails done with gel application.  She reports she had some pain to the area after getting her nails done.  She did peel the nail off, which resulting in green discoloration.  She reports she has had artificial nails on for the past 4 weeks.  She has since removed all of the artificial nails to the rest of her fingernails.  After removal is when she noticed the discoloration of the nail.  She did have this issue about 2 years ago, and it was resolved with oral terbinafine.

## 2021-07-07 NOTE — PROGRESS NOTES
Virtual Visit: Established Patient   This visit was conducted via Zoom using secure and encrypted videoconferencing technology. The patient was in a private location in the state of [place].    The patient's identity was confirmed and verbal consent was obtained for this virtual visit.    Subjective  Chief Complaint  Chief Complaint   Patient presents with   • Nail Problem     L thumb     History of Present Illness  Radha Marlow is a 50 y.o. female. This established patient is here today to discuss the following:    Onychomycosis  This is a new condition.  She reports that she does get her nails done with gel application.  She reports she had some pain to the area after getting her nails done.  She did peel the nail off, which resulting in green discoloration.  She reports she has had artificial nails on for the past 4 weeks.  She has since removed all of the artificial nails to the rest of her fingernails.  After removal is when she noticed the discoloration of the nail.  She did have this issue about 2 years ago, and it was resolved with oral terbinafine.     Past Medical History    Allergies: Bactrim [sulfamethoxazole w-trimethoprim]  Past Medical History:   Diagnosis Date   • Allergy    • Anemia    • Arthritis    • Hyperlipidemia    • Hypertriglyceridemia 3/17/2010     Current Outpatient Medications Ordered in Epic   Medication Sig Dispense Refill   • terbinafine (LAMISIL) 250 MG Tab Take 1 tablet by mouth every day. 90 tablet 0   • doxycycline (VIBRAMYCIN) 100 MG Tab Take 100 mg by mouth 2 times a day.     • atorvastatin (LIPITOR) 20 MG Tab Take 1 tablet by mouth every day. TAKE 1 TAB BY MOUTH EVERY DAY. 90 tablet 0   • metFORMIN ER (GLUCOPHAGE XR) 500 MG TABLET SR 24 HR Take 2 Tablets by mouth 2 times a day. 360 tablet 0   • ibuprofen (MOTRIN) 800 MG Tab Take 1 Tab by mouth every 8 hours as needed. 30 Tab 1   • metronidazole (METROCREAM) 0.75 % cream Apply 5 mg to affected area(s) 2 times a day. 45 g 4  "  • Cholecalciferol (VITAMIN D) 2000 UNITS Cap Take  by mouth.     • Multiple Vitamin (DAILY VITAMINS PO) Take  by mouth.       No current Epic-ordered facility-administered medications on file.     Review of Systems:   See HPI.      Objective  Physical Exam:   Temp 36.7 °C (98 °F)   Resp 12   Ht 1.626 m (5' 4\")   Wt 108 kg (237 lb)  Body mass index is 40.68 kg/m².  Constitutional: Alert, no distress, well-groomed.  Skin: No rashes in visible areas.  There is dark, green/yellowish discoloration to the tip of her left thumb nail.  No drainage, swelling, redness observed to the finger.   Eye: Round. Conjunctiva clear, lids normal. No icterus.   ENMT: Lips pink without lesions, good dentition, moist mucous membranes. Phonation normal.  Neck: No masses on visible inspection. Moves freely without pain.  Respiratory: Unlabored respiratory effort, no cough or audible wheeze  Psych: Alert and oriented x3, normal affect and mood.     Assessment/Plan  1. Onychomycosis  This is a new condition.   Previous prescribed terbinafine and understands risk of elevation in LFTs with use of medication.  Discussed option for OTC antifungal nail application as well.    Discussed with patient to obtain labs prior to next visit to check status of liver function tests.    Discussed importance to take antifungal in its entirety.  Advised to avoid artificial gel/nail applications.   - terbinafine (LAMISIL) 250 MG Tab; Take 1 tablet by mouth every day.  Dispense: 90 tablet; Refill: 0    Health Maintenance: Completed    Return in about 9 weeks (around 9/8/2021), or if symptoms worsen or fail to improve, for already scheduled appointment.    Patient verbalized understanding and agreed to plan of care.  We have discussed to contact me with needs via MyChart or by phone if needed.      I have placed the above orders and discussed them with an approved delegating provider.  The MA is performing the above orders under the direction of  " [place].    Please note that this dictation was created using voice recognition software. I have made every reasonable attempt to correct obvious errors, but I expect that there are errors of grammar and possibly content that I did not discover before finalizing the note.    KATHLEEN Manzano  [facility]

## 2021-07-13 ENCOUNTER — OFFICE VISIT (OUTPATIENT)
Dept: URGENT CARE | Facility: PHYSICIAN GROUP | Age: 51
End: 2021-07-13
Payer: COMMERCIAL

## 2021-07-13 ENCOUNTER — APPOINTMENT (OUTPATIENT)
Dept: RADIOLOGY | Facility: IMAGING CENTER | Age: 51
End: 2021-07-13
Attending: NURSE PRACTITIONER
Payer: COMMERCIAL

## 2021-07-13 VITALS
BODY MASS INDEX: 41.48 KG/M2 | HEART RATE: 88 BPM | RESPIRATION RATE: 16 BRPM | WEIGHT: 243 LBS | DIASTOLIC BLOOD PRESSURE: 72 MMHG | TEMPERATURE: 98.4 F | SYSTOLIC BLOOD PRESSURE: 112 MMHG | OXYGEN SATURATION: 94 % | HEIGHT: 64 IN
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
## 2021-07-13 DIAGNOSIS — S39.92XA INJURY OF LOW BACK, INITIAL ENCOUNTER: ICD-10-CM

## 2021-07-13 DIAGNOSIS — M62.830 LUMBAR PARASPINAL MUSCLE SPASM: ICD-10-CM

## 2021-07-13 DIAGNOSIS — M54.50 ACUTE LEFT-SIDED LOW BACK PAIN, UNSPECIFIED WHETHER SCIATICA PRESENT: ICD-10-CM

## 2021-07-13 PROCEDURE — 99213 OFFICE O/P EST LOW 20 MIN: CPT | Performed by: NURSE PRACTITIONER

## 2021-07-13 PROCEDURE — 72100 X-RAY EXAM L-S SPINE 2/3 VWS: CPT | Mod: TC | Performed by: NURSE PRACTITIONER

## 2021-07-13 RX ORDER — CYCLOBENZAPRINE HCL 10 MG
10 TABLET ORAL 3 TIMES DAILY PRN
Qty: 20 TABLET | Refills: 0 | Status: SHIPPED | OUTPATIENT
Start: 2021-07-13 | End: 2021-09-08

## 2021-07-13 RX ORDER — METHYLPREDNISOLONE 4 MG/1
TABLET ORAL
Qty: 21 TABLET | Refills: 0 | Status: SHIPPED | OUTPATIENT
Start: 2021-07-13 | End: 2021-09-08

## 2021-07-13 ASSESSMENT — ENCOUNTER SYMPTOMS
BOWEL INCONTINENCE: 0
FALLS: 1
BACK PAIN: 1

## 2021-07-13 NOTE — PROGRESS NOTES
Subjective:      Radha Marlow is a 50 y.o. female who presents with Back Injury (lower to mid back pain, pt stepped on loose black and fell, onset today at 11am )            Back Pain  This is a new problem. Episode onset: pt reports new onset of low back pain after a fall today when she tried to step onto a block, which was unstable, and she fell on her right side but tweaked the left hip. The pain is present in the lumbar spine. The quality of the pain is described as stabbing. Radiates to: radiates to front of abd, left side. Pertinent negatives include no bladder incontinence or bowel incontinence. (No issues with walking. Stiffness is worse with sitting) Risk factors include sedentary lifestyle and obesity. She has tried ice for the symptoms. The treatment provided mild relief.       Review of Systems   Gastrointestinal: Negative for bowel incontinence.   Genitourinary: Negative for bladder incontinence.   Musculoskeletal: Positive for back pain and falls.   All other systems reviewed and are negative.    Past Medical History:   Diagnosis Date   • Allergy    • Anemia    • Arthritis    • Hyperlipidemia    • Hypertriglyceridemia 3/17/2010    History reviewed. No pertinent surgical history.   Social History     Socioeconomic History   • Marital status:      Spouse name: Not on file   • Number of children: 4   • Years of education: Not on file   • Highest education level: Not on file   Occupational History   • Occupation: student early childhood education     Comment:    Tobacco Use   • Smoking status: Former Smoker     Packs/day: 0.25     Years: 1.00     Pack years: 0.25     Types: Cigarettes     Quit date: 1991     Years since quittin.5   • Smokeless tobacco: Never Used   Vaping Use   • Vaping Use: Never used   Substance and Sexual Activity   • Alcohol use: No     Alcohol/week: 0.0 oz     Comment: Jain prohibits   • Drug use: No   • Sexual activity: Yes     Partners:  "Male     Birth control/protection: Surgical   Other Topics Concern   • Not on file   Social History Narrative   • Not on file     Social Determinants of Health     Financial Resource Strain:    • Difficulty of Paying Living Expenses:    Food Insecurity:    • Worried About Running Out of Food in the Last Year:    • Ran Out of Food in the Last Year:    Transportation Needs:    • Lack of Transportation (Medical):    • Lack of Transportation (Non-Medical):    Physical Activity:    • Days of Exercise per Week:    • Minutes of Exercise per Session:    Stress:    • Feeling of Stress :    Social Connections:    • Frequency of Communication with Friends and Family:    • Frequency of Social Gatherings with Friends and Family:    • Attends [employer] Services:    • Active Member of Clubs or Organizations:    • Attends Club or Organization Meetings:    • Marital Status:    Intimate Partner Violence:    • Fear of Current or Ex-Partner:    • Emotionally Abused:    • Physically Abused:    • Sexually Abused:           Objective:     /72 (BP Location: Left arm, Patient Position: Sitting, BP Cuff Size: Adult)   Pulse 88   Temp 36.9 °C (98.4 °F) (Temporal)   Resp 16   Ht 1.626 m (5' 4\")   Wt 110 kg (243 lb)   SpO2 94%   BMI 41.71 kg/m²      Physical Exam  Vitals and nursing note reviewed.   Constitutional:       Appearance: Normal appearance. She is normal weight.   HENT:      Head: Normocephalic and atraumatic.      Nose: Nose normal.      Mouth/Throat:      Mouth: Mucous membranes are moist.      Pharynx: Oropharynx is clear.   Eyes:      Extraocular Movements: Extraocular movements intact.      Pupils: Pupils are equal, round, and reactive to light.   Cardiovascular:      Rate and Rhythm: Normal rate and regular rhythm.   Pulmonary:      Effort: Pulmonary effort is normal.   Musculoskeletal:         General: Normal range of motion.      Cervical back: Normal and normal range of motion.      Thoracic back: Normal.      " Lumbar back: Tenderness present. No swelling.        Back:    Skin:     General: Skin is warm and dry.      Capillary Refill: Capillary refill takes less than 2 seconds.   Neurological:      General: No focal deficit present.      Mental Status: She is alert and oriented to person, place, and time. Mental status is at baseline.   Psychiatric:         Mood and Affect: Mood normal.         Speech: Speech normal.         Thought Content: Thought content normal.         Judgment: Judgment normal.                 7/13/2021 3:16 PM     HISTORY/REASON FOR EXAM:  Pain Following Trauma.  Fall, low back pain     TECHNIQUE/ EXAM DESCRIPTION AND NUMBER OF VIEWS:  3 views of the lumbar spine.     COMPARISON: None.     FINDINGS:  Vertebral alignment is preserved.  Vertebral body heights are preserved.  Multilevel loss of disc height and osteophyte formation, most apparent at L5-S1.  The facet joints show normal alignment.  Lumbosacral junction is intact.  Pedicles appear intact.     IMPRESSION:     1.  No lumbar spine fracture or subluxation.  2.  Mild to moderate multilevel degenerative changes.       Assessment/Plan:        1. Fall, initial encounter  - DX-LUMBAR SPINE-2 OR 3 VIEWS; Future    2. Injury of low back, initial encounter  - DX-LUMBAR SPINE-2 OR 3 VIEWS; Future    3. Lumbar paraspinal muscle spasm  - cyclobenzaprine (FLEXERIL) 10 mg Tab; Take 1 tablet by mouth 3 times a day as needed for Muscle Spasms.  Dispense: 20 tablet; Refill: 0    4. Acute left-sided low back pain, unspecified whether sciatica present  - methylPREDNISolone (MEDROL DOSEPAK) 4 MG Tablet Therapy Pack; Follow schedule on package instructions.  Dispense: 21 tablet; Refill: 0    Sedating effects of flexeril discussed  Alternate tylenol and ibuprofen as needed for pain  Alternate ice and warm compresses  Encouraged light activity during the day  No bed rest  Supportive care, differential diagnoses, and indications for immediate follow-up discussed  with patient.    Pathogenesis of diagnosis discussed including typical length and natural progression.      Instructed to return to UC or nearest emergency department if symptoms fail to improve, for any change in condition, further concerns, or new concerning symptoms.  Patient states understanding of the plan of care and discharge instructions.

## 2021-08-30 DIAGNOSIS — E11.65 TYPE 2 DIABETES MELLITUS WITH HYPERGLYCEMIA, WITHOUT LONG-TERM CURRENT USE OF INSULIN (HCC): ICD-10-CM

## 2021-08-30 DIAGNOSIS — E11.69 HYPERLIPIDEMIA ASSOCIATED WITH TYPE 2 DIABETES MELLITUS (HCC): ICD-10-CM

## 2021-08-30 DIAGNOSIS — E78.5 HYPERLIPIDEMIA ASSOCIATED WITH TYPE 2 DIABETES MELLITUS (HCC): ICD-10-CM

## 2021-08-31 RX ORDER — ATORVASTATIN CALCIUM 20 MG/1
TABLET, FILM COATED ORAL
Qty: 90 TABLET | Refills: 0 | Status: SHIPPED | OUTPATIENT
Start: 2021-08-31 | End: 2021-11-30

## 2021-08-31 RX ORDER — METFORMIN HYDROCHLORIDE 500 MG/1
1000 TABLET, EXTENDED RELEASE ORAL 2 TIMES DAILY
Qty: 360 TABLET | Refills: 0 | Status: SHIPPED | OUTPATIENT
Start: 2021-08-31 | End: 2021-11-30

## 2021-09-01 ENCOUNTER — HOSPITAL ENCOUNTER (OUTPATIENT)
Dept: LAB | Facility: MEDICAL CENTER | Age: 51
End: 2021-09-01
Attending: NURSE PRACTITIONER
Payer: COMMERCIAL

## 2021-09-01 DIAGNOSIS — E11.65 TYPE 2 DIABETES MELLITUS WITH HYPERGLYCEMIA, WITHOUT LONG-TERM CURRENT USE OF INSULIN (HCC): ICD-10-CM

## 2021-09-01 DIAGNOSIS — R79.89 ELEVATED LIVER FUNCTION TESTS: ICD-10-CM

## 2021-09-01 LAB
ALBUMIN SERPL BCP-MCNC: 4.1 G/DL (ref 3.2–4.9)
ALBUMIN/GLOB SERPL: 1.2 G/DL
ALP SERPL-CCNC: 74 U/L (ref 30–99)
ALT SERPL-CCNC: 110 U/L (ref 2–50)
ANION GAP SERPL CALC-SCNC: 14 MMOL/L (ref 7–16)
AST SERPL-CCNC: 69 U/L (ref 12–45)
BILIRUB SERPL-MCNC: 0.5 MG/DL (ref 0.1–1.5)
BUN SERPL-MCNC: 15 MG/DL (ref 8–22)
CALCIUM SERPL-MCNC: 9.7 MG/DL (ref 8.5–10.5)
CHLORIDE SERPL-SCNC: 98 MMOL/L (ref 96–112)
CO2 SERPL-SCNC: 22 MMOL/L (ref 20–33)
CREAT SERPL-MCNC: 0.66 MG/DL (ref 0.5–1.4)
EST. AVERAGE GLUCOSE BLD GHB EST-MCNC: 200 MG/DL
FASTING STATUS PATIENT QL REPORTED: NORMAL
GLOBULIN SER CALC-MCNC: 3.5 G/DL (ref 1.9–3.5)
GLUCOSE SERPL-MCNC: 250 MG/DL (ref 65–99)
HBA1C MFR BLD: 8.6 % (ref 4–5.6)
POTASSIUM SERPL-SCNC: 4.8 MMOL/L (ref 3.6–5.5)
PROT SERPL-MCNC: 7.6 G/DL (ref 6–8.2)
SODIUM SERPL-SCNC: 134 MMOL/L (ref 135–145)

## 2021-09-01 PROCEDURE — 80053 COMPREHEN METABOLIC PANEL: CPT

## 2021-09-01 PROCEDURE — 83036 HEMOGLOBIN GLYCOSYLATED A1C: CPT

## 2021-09-01 PROCEDURE — 36415 COLL VENOUS BLD VENIPUNCTURE: CPT

## 2021-09-08 ENCOUNTER — OFFICE VISIT (OUTPATIENT)
Dept: MEDICAL GROUP | Facility: PHYSICIAN GROUP | Age: 51
End: 2021-09-08
Payer: COMMERCIAL

## 2021-09-08 VITALS
HEART RATE: 75 BPM | WEIGHT: 237 LBS | TEMPERATURE: 97.8 F | HEIGHT: 64 IN | OXYGEN SATURATION: 96 % | SYSTOLIC BLOOD PRESSURE: 116 MMHG | RESPIRATION RATE: 12 BRPM | BODY MASS INDEX: 40.46 KG/M2 | DIASTOLIC BLOOD PRESSURE: 80 MMHG

## 2021-09-08 DIAGNOSIS — E11.65 TYPE 2 DIABETES MELLITUS WITH HYPERGLYCEMIA, WITHOUT LONG-TERM CURRENT USE OF INSULIN (HCC): ICD-10-CM

## 2021-09-08 DIAGNOSIS — K76.0 NONALCOHOLIC FATTY LIVER DISEASE: ICD-10-CM

## 2021-09-08 DIAGNOSIS — E66.01 CLASS 3 SEVERE OBESITY DUE TO EXCESS CALORIES WITH SERIOUS COMORBIDITY AND BODY MASS INDEX (BMI) OF 40.0 TO 44.9 IN ADULT (HCC): ICD-10-CM

## 2021-09-08 DIAGNOSIS — E78.5 HYPERLIPIDEMIA ASSOCIATED WITH TYPE 2 DIABETES MELLITUS (HCC): ICD-10-CM

## 2021-09-08 DIAGNOSIS — E11.69 HYPERLIPIDEMIA ASSOCIATED WITH TYPE 2 DIABETES MELLITUS (HCC): ICD-10-CM

## 2021-09-08 PROBLEM — E66.813 CLASS 3 SEVERE OBESITY DUE TO EXCESS CALORIES WITH SERIOUS COMORBIDITY AND BODY MASS INDEX (BMI) OF 40.0 TO 44.9 IN ADULT (HCC): Status: ACTIVE | Noted: 2018-03-09

## 2021-09-08 PROBLEM — E66.9 OBESITY (BMI 35.0-39.9 WITHOUT COMORBIDITY): Status: RESOLVED | Noted: 2019-03-27 | Resolved: 2021-09-08

## 2021-09-08 PROCEDURE — 99214 OFFICE O/P EST MOD 30 MIN: CPT | Performed by: NURSE PRACTITIONER

## 2021-09-08 RX ORDER — DOXYCYCLINE HYCLATE 100 MG/1
CAPSULE ORAL
COMMUNITY
Start: 2021-08-20 | End: 2021-09-08

## 2021-09-08 NOTE — ASSESSMENT & PLAN NOTE
This is a chronic condition, not controlled.  Current medications:  Metformin XR 1000 mg BID.  Last A1c and fasting glucose from 9/2021 - 8.6% and 250 respectively.  She denies polyuria, polydipsia, numbness or tingling ,or blurred vision.  She does report eating small meals and trying to engage in physical activity - walking around the block with her .  She does endorse feelings of frustration and defeat due to increase in A1c and glucose levels; however, she is motivated to work on weight loss, diet, and physical activity.    > Referral placed for nutrition services for additional resource for diet.  Advised to continue working on lifestyle measures.  Continue metformin 1000 mg BID; add Jardiance 10 mg daily.  Recheck POCT A1c in 3 months.

## 2021-09-08 NOTE — PROGRESS NOTES
Subjective  Chief Complaint  Chief Complaint   Patient presents with   • Follow-Up     History of Present Illness  Radha presents today with the following.  Problem   Class 3 Severe Obesity Due to Excess Calories With Serious Comorbidity and Body Mass Index (Bmi) of 40.0 to 44.9 in Adult (Hcc)   Nonalcoholic Fatty Liver Disease   Type 2 diabetes mellitus with hyperglycemia, without long-term current use of insulin (HCC)   Hyperlipidemia Associated With Type 2 Diabetes Mellitus (Hcc)   Obesity (BMI 35.0-39.9 without comorbidity) ([facility]) (Resolved)     Past Medical History    Allergies: Bactrim [sulfamethoxazole w-trimethoprim]  Past Medical History:   Diagnosis Date   • Allergy    • Anemia    • Arthritis    • Hyperlipidemia    • Hypertriglyceridemia 3/17/2010   • Obesity (BMI 35.0-39.9 without comorbidity) ([facility]) 3/27/2019     Current Outpatient Medications Ordered in Epic   Medication Sig Dispense Refill   • Empagliflozin 10 MG Tab Take 10 mg by mouth every day for 90 days. 90 Tablet 0   • metFORMIN ER (GLUCOPHAGE XR) 500 MG TABLET SR 24 HR TAKE 2 TABLETS BY MOUTH 2 TIMES A DAY. 360 Tablet 0   • atorvastatin (LIPITOR) 20 MG Tab TAKE 1 TABLET BY MOUTH EVERY DAY 90 Tablet 0   • terbinafine (LAMISIL) 250 MG Tab Take 1 tablet by mouth every day. 90 tablet 0   • doxycycline (VIBRAMYCIN) 100 MG Tab Take 100 mg by mouth 2 times a day.     • ibuprofen (MOTRIN) 800 MG Tab Take 1 Tab by mouth every 8 hours as needed. 30 Tab 1   • metronidazole (METROCREAM) 0.75 % cream Apply 5 mg to affected area(s) 2 times a day. 45 g 4   • Cholecalciferol (VITAMIN D) 2000 UNITS Cap Take  by mouth.     • Multiple Vitamin (DAILY VITAMINS PO) Take  by mouth.       No current Epic-ordered facility-administered medications on file.     Review of Systems  See below.     Objective  Physical Exam  /80 (BP Location: Left arm, Patient Position: Sitting, BP Cuff Size: Adult)   Pulse 75   Temp 36.6 °C (97.8 °F) (Temporal)   Resp 12   Ht 1.626 m  "(5' 4\")   Wt 108 kg (237 lb)   SpO2 96%  Body mass index is 40.68 kg/m².  General:  Alert and oriented.  Well appearing.  NAD  Neck: Supple without JVD. No lymphadenopathy.  Pulmonary:  Normal effort.  Clear to ausculation without rales, ronchi, or wheezing.  Cardiovascular:  Regular rate and rhythm without murmur, rubs or gallop.   Skin:  Warm and dry.  No obvious lesions.  Musculoskeletal:  No extremity cyanosis, clubbing, or edema.  Diabetic foot exam: No lesions or calluses noted. 2+ pedal pulses. Sensation intact with 10 out of 10 on monofilament test.    Diagnostic Testing/Findings  Labs:  Results for AUBREY JARAMILLO (MRN [id number]) as of 9/8/2021 07:35   Ref. Range 9/1/2021 07:53   Sodium Latest Ref Range: 135 - 145 mmol/L 134 (L)   Potassium Latest Ref Range: 3.6 - 5.5 mmol/L 4.8   Chloride Latest Ref Range: 96 - 112 mmol/L 98   Co2 Latest Ref Range: 20 - 33 mmol/L 22   Anion Gap Latest Ref Range: 7.0 - 16.0  14.0   Glucose Latest Ref Range: 65 - 99 mg/dL 250 (H)   Bun Latest Ref Range: 8 - 22 mg/dL 15   Creatinine Latest Ref Range: 0.50 - 1.40 mg/dL 0.66   GFR If  Latest Ref Range: >60 mL/min/1.73 m 2 >60   GFR If Non  Latest Ref Range: >60 mL/min/1.73 m 2 >60   Calcium Latest Ref Range: 8.5 - 10.5 mg/dL 9.7   AST(SGOT) Latest Ref Range: 12 - 45 U/L 69 (H)   ALT(SGPT) Latest Ref Range: 2 - 50 U/L 110 (H)   Alkaline Phosphatase Latest Ref Range: 30 - 99 U/L 74   Total Bilirubin Latest Ref Range: 0.1 - 1.5 mg/dL 0.5   Albumin Latest Ref Range: 3.2 - 4.9 g/dL 4.1   Total Protein Latest Ref Range: 6.0 - 8.2 g/dL 7.6   Globulin Latest Ref Range: 1.9 - 3.5 g/dL 3.5   A-G Ratio Latest Units: g/dL 1.2   Glycohemoglobin Latest Ref Range: 4.0 - 5.6 % 8.6 (H)   Estim. Avg Glu Latest Units: mg/dL 200   Fasting Status Unknown Fasting     Assessment/Plan  50 y.o. female with the following issues.    1. Hyperlipidemia associated with type 2 diabetes mellitus (HCC)  Empagliflozin 10 MG " Tab    REFERRAL TO NUTRITION SERVICES    Diabetic Monofilament LE Exam   2. Type 2 diabetes mellitus with hyperglycemia, without long-term current use of insulin ([facility])  Empagliflozin 10 MG Tab    REFERRAL TO NUTRITION SERVICES    Diabetic Monofilament LE Exam   3. Nonalcoholic fatty liver disease  REFERRAL TO NUTRITION SERVICES   4. Class 3 severe obesity due to excess calories with serious comorbidity and body mass index (BMI) of 40.0 to 44.9 in adult (HCC)  Empagliflozin 10 MG Tab    REFERRAL TO NUTRITION SERVICES      Hyperlipidemia associated with type 2 diabetes mellitus  This is a chronic condition, not controlled.  Last lipid panel from 5/2021.    > Continue diet and lifestyle measures.  Referral to nutrition services placed to assist with diabetic diet.  Continue atorvastatin 20 mg daily.  Recheck lipid panel annually.     Nonalcoholic fatty liver disease  This is a chronic condition, not controlled.  Elevated liver enzymes from last check 9/2021, which appear consistent in elevation with her A1c/glucose levels.   > Discussed importance of control of A1c/glucose levels, and she is motivated to work on diet and lifestyle for both diabetic control and weight loss.  Continue avoidance of tylenol for now.  Recheck liver function tests in 6 months.     Type 2 diabetes mellitus with hyperglycemia, without long-term current use of insulin ([facility])  This is a chronic condition, not controlled.  Current medications:  Metformin XR 1000 mg BID.  Last A1c and fasting glucose from 9/2021 - 8.6% and 250 respectively.  She denies polyuria, polydipsia, numbness or tingling ,or blurred vision.  She does report eating small meals and trying to engage in physical activity - walking around the block with her .  She does endorse feelings of frustration and defeat due to increase in A1c and glucose levels; however, she is motivated to work on weight loss, diet, and physical activity.    > Referral placed for nutrition  services for additional resource for diet.  Advised to continue working on lifestyle measures.  Continue metformin 1000 mg BID; add Jardiance 10 mg daily.  Recheck POCT A1c in 3 months.     Return in about 3 months (around 12/8/2021), or if symptoms worsen or fail to improve.    Health Maintenance: Completed    I have placed the below orders and discussed them with an approved delegating provider.  The MA is performing the below orders under the direction of Dr. Be.    Please note that this dictation was created using voice recognition software. I have worked with consultants from the vendor as well as technical experts from [facility] to optimize the interface. I have made every reasonable attempt to correct obvious errors, but I expect that there are errors of grammar and possibly content that I did not discover before finalizing the note.    KATHLEEN Manzano  [facility]

## 2021-09-08 NOTE — PATIENT INSTRUCTIONS

## 2021-09-08 NOTE — ASSESSMENT & PLAN NOTE
This is a chronic condition, not controlled.  Last lipid panel from 5/2021.    > Continue diet and lifestyle measures.  Referral to nutrition services placed to assist with diabetic diet.  Continue atorvastatin 20 mg daily.  Recheck lipid panel annually.

## 2021-09-08 NOTE — ASSESSMENT & PLAN NOTE
This is a chronic condition, not controlled.  Elevated liver enzymes from last check 9/2021, which appear consistent in elevation with her A1c/glucose levels.   > Discussed importance of control of A1c/glucose levels, and she is motivated to work on diet and lifestyle for both diabetic control and weight loss.  Continue avoidance of tylenol for now.  Recheck liver function tests in 6 months.

## 2021-09-11 ENCOUNTER — PATIENT MESSAGE (OUTPATIENT)
Dept: MEDICAL GROUP | Facility: PHYSICIAN GROUP | Age: 51
End: 2021-09-11

## 2021-09-11 DIAGNOSIS — E11.69 HYPERLIPIDEMIA ASSOCIATED WITH TYPE 2 DIABETES MELLITUS (HCC): ICD-10-CM

## 2021-09-11 DIAGNOSIS — E11.65 TYPE 2 DIABETES MELLITUS WITH HYPERGLYCEMIA, WITHOUT LONG-TERM CURRENT USE OF INSULIN (HCC): ICD-10-CM

## 2021-09-11 DIAGNOSIS — E78.5 HYPERLIPIDEMIA ASSOCIATED WITH TYPE 2 DIABETES MELLITUS (HCC): ICD-10-CM

## 2021-09-14 RX ORDER — GLUCOSAMINE HCL/CHONDROITIN SU 500-400 MG
CAPSULE ORAL
Qty: 100 EACH | Refills: 3 | Status: SHIPPED | OUTPATIENT
Start: 2021-09-14

## 2021-09-14 RX ORDER — LANCETS 30 GAUGE
EACH MISCELLANEOUS
Qty: 100 EACH | Refills: 3 | Status: SHIPPED | OUTPATIENT
Start: 2021-09-14

## 2021-09-14 NOTE — PROGRESS NOTES
1. Type 2 diabetes mellitus with hyperglycemia, without long-term current use of insulin (HCC)  - Blood Glucose Meter Kit; Test blood sugar as recommended by provider. Insurance preferred blood glucose monitoring kit.  Dispense: 1 Kit; Refill: 0  - Blood Glucose Test Strips; Use one insurance preferred strip to test blood sugar twice daily.  Dispense: 100 Strip; Refill: 3  - Lancets; Use one insurance preferred lancet to test blood sugar twice daily.  Dispense: 100 Each; Refill: 3  - Alcohol Swabs; Wipe site with prep pad prior to injection.  Dispense: 100 Each; Refill: 3    Request for blood sugar monitor by patient.  Orders placed.

## 2021-10-21 ENCOUNTER — PATIENT MESSAGE (OUTPATIENT)
Dept: MEDICAL GROUP | Facility: PHYSICIAN GROUP | Age: 51
End: 2021-10-21

## 2021-10-21 DIAGNOSIS — Z12.12 SCREENING FOR COLORECTAL CANCER: ICD-10-CM

## 2021-10-21 DIAGNOSIS — Z12.11 SCREENING FOR COLORECTAL CANCER: ICD-10-CM

## 2021-11-27 ENCOUNTER — HOSPITAL ENCOUNTER (EMERGENCY)
Facility: MEDICAL CENTER | Age: 51
End: 2021-11-27
Attending: EMERGENCY MEDICINE
Payer: COMMERCIAL

## 2021-11-27 ENCOUNTER — APPOINTMENT (OUTPATIENT)
Dept: RADIOLOGY | Facility: MEDICAL CENTER | Age: 51
End: 2021-11-27
Attending: EMERGENCY MEDICINE
Payer: COMMERCIAL

## 2021-11-27 VITALS
TEMPERATURE: 97.2 F | WEIGHT: 230 LBS | DIASTOLIC BLOOD PRESSURE: 72 MMHG | RESPIRATION RATE: 16 BRPM | HEART RATE: 56 BPM | HEIGHT: 64 IN | OXYGEN SATURATION: 94 % | BODY MASS INDEX: 39.27 KG/M2 | SYSTOLIC BLOOD PRESSURE: 119 MMHG

## 2021-11-27 DIAGNOSIS — M54.50 ACUTE LEFT-SIDED LOW BACK PAIN WITHOUT SCIATICA: Primary | ICD-10-CM

## 2021-11-27 LAB
ALBUMIN SERPL BCP-MCNC: 4.1 G/DL (ref 3.2–4.9)
ALBUMIN/GLOB SERPL: 1.2 G/DL
ALP SERPL-CCNC: 58 U/L (ref 30–99)
ALT SERPL-CCNC: 57 U/L (ref 2–50)
ANION GAP SERPL CALC-SCNC: 11 MMOL/L (ref 7–16)
APPEARANCE UR: CLEAR
AST SERPL-CCNC: 39 U/L (ref 12–45)
BASOPHILS # BLD AUTO: 0.5 % (ref 0–1.8)
BASOPHILS # BLD: 0.04 K/UL (ref 0–0.12)
BILIRUB SERPL-MCNC: 0.4 MG/DL (ref 0.1–1.5)
BILIRUB UR QL STRIP.AUTO: NEGATIVE
BUN SERPL-MCNC: 16 MG/DL (ref 8–22)
CALCIUM SERPL-MCNC: 8.9 MG/DL (ref 8.5–10.5)
CHLORIDE SERPL-SCNC: 103 MMOL/L (ref 96–112)
CO2 SERPL-SCNC: 20 MMOL/L (ref 20–33)
COLOR UR: YELLOW
CREAT SERPL-MCNC: 0.62 MG/DL (ref 0.5–1.4)
EOSINOPHIL # BLD AUTO: 0.11 K/UL (ref 0–0.51)
EOSINOPHIL NFR BLD: 1.5 % (ref 0–6.9)
ERYTHROCYTE [DISTWIDTH] IN BLOOD BY AUTOMATED COUNT: 42 FL (ref 35.9–50)
GLOBULIN SER CALC-MCNC: 3.4 G/DL (ref 1.9–3.5)
GLUCOSE BLD-MCNC: 134 MG/DL (ref 65–99)
GLUCOSE SERPL-MCNC: 145 MG/DL (ref 65–99)
GLUCOSE UR STRIP.AUTO-MCNC: >=1000 MG/DL
HCT VFR BLD AUTO: 41.1 % (ref 37–47)
HGB BLD-MCNC: 13.8 G/DL (ref 12–16)
IMM GRANULOCYTES # BLD AUTO: 0.04 K/UL (ref 0–0.11)
IMM GRANULOCYTES NFR BLD AUTO: 0.5 % (ref 0–0.9)
KETONES UR STRIP.AUTO-MCNC: ABNORMAL MG/DL
LEUKOCYTE ESTERASE UR QL STRIP.AUTO: NEGATIVE
LYMPHOCYTES # BLD AUTO: 1.88 K/UL (ref 1–4.8)
LYMPHOCYTES NFR BLD: 25.7 % (ref 22–41)
MCH RBC QN AUTO: 31.1 PG (ref 27–33)
MCHC RBC AUTO-ENTMCNC: 33.6 G/DL (ref 33.6–35)
MCV RBC AUTO: 92.6 FL (ref 81.4–97.8)
MICRO URNS: ABNORMAL
MONOCYTES # BLD AUTO: 0.65 K/UL (ref 0–0.85)
MONOCYTES NFR BLD AUTO: 8.9 % (ref 0–13.4)
NEUTROPHILS # BLD AUTO: 4.6 K/UL (ref 2–7.15)
NEUTROPHILS NFR BLD: 62.9 % (ref 44–72)
NITRITE UR QL STRIP.AUTO: NEGATIVE
NRBC # BLD AUTO: 0 K/UL
NRBC BLD-RTO: 0 /100 WBC
PH UR STRIP.AUTO: 5 [PH] (ref 5–8)
PLATELET # BLD AUTO: 218 K/UL (ref 164–446)
PMV BLD AUTO: 10.1 FL (ref 9–12.9)
POTASSIUM SERPL-SCNC: 4.4 MMOL/L (ref 3.6–5.5)
PROT SERPL-MCNC: 7.5 G/DL (ref 6–8.2)
PROT UR QL STRIP: NEGATIVE MG/DL
RBC # BLD AUTO: 4.44 M/UL (ref 4.2–5.4)
RBC UR QL AUTO: NEGATIVE
SODIUM SERPL-SCNC: 134 MMOL/L (ref 135–145)
SP GR UR STRIP.AUTO: 1.04
UROBILINOGEN UR STRIP.AUTO-MCNC: 0.2 MG/DL
WBC # BLD AUTO: 7.3 K/UL (ref 4.8–10.8)

## 2021-11-27 PROCEDURE — A9270 NON-COVERED ITEM OR SERVICE: HCPCS | Performed by: EMERGENCY MEDICINE

## 2021-11-27 PROCEDURE — 82962 GLUCOSE BLOOD TEST: CPT

## 2021-11-27 PROCEDURE — 74176 CT ABD & PELVIS W/O CONTRAST: CPT

## 2021-11-27 PROCEDURE — 700102 HCHG RX REV CODE 250 W/ 637 OVERRIDE(OP): Performed by: EMERGENCY MEDICINE

## 2021-11-27 PROCEDURE — 99284 EMERGENCY DEPT VISIT MOD MDM: CPT

## 2021-11-27 PROCEDURE — 81003 URINALYSIS AUTO W/O SCOPE: CPT

## 2021-11-27 PROCEDURE — 80053 COMPREHEN METABOLIC PANEL: CPT

## 2021-11-27 PROCEDURE — 85025 COMPLETE CBC W/AUTO DIFF WBC: CPT

## 2021-11-27 RX ORDER — ACETAMINOPHEN 500 MG
1000 TABLET ORAL ONCE
Status: COMPLETED | OUTPATIENT
Start: 2021-11-27 | End: 2021-11-27

## 2021-11-27 RX ORDER — METHOCARBAMOL 750 MG/1
1500 TABLET, FILM COATED ORAL ONCE
Status: COMPLETED | OUTPATIENT
Start: 2021-11-27 | End: 2021-11-27

## 2021-11-27 RX ORDER — METHOCARBAMOL 750 MG/1
1500 TABLET, FILM COATED ORAL 3 TIMES DAILY PRN
Qty: 30 TABLET | Refills: 0 | Status: SHIPPED | OUTPATIENT
Start: 2021-11-27 | End: 2022-03-25

## 2021-11-27 RX ADMIN — ACETAMINOPHEN 1000 MG: 500 TABLET ORAL at 04:08

## 2021-11-27 RX ADMIN — METHOCARBAMOL 1500 MG: 750 TABLET ORAL at 04:08

## 2021-11-27 ASSESSMENT — LIFESTYLE VARIABLES
TOTAL SCORE: 0
TOTAL SCORE: 0
HAVE PEOPLE ANNOYED YOU BY CRITICIZING YOUR DRINKING: NO
HOW MANY TIMES IN THE PAST YEAR HAVE YOU HAD 5 OR MORE DRINKS IN A DAY: 0
EVER HAD A DRINK FIRST THING IN THE MORNING TO STEADY YOUR NERVES TO GET RID OF A HANGOVER: NO
CONSUMPTION TOTAL: NEGATIVE
AVERAGE NUMBER OF DAYS PER WEEK YOU HAVE A DRINK CONTAINING ALCOHOL: 0
DO YOU DRINK ALCOHOL: NO
ON A TYPICAL DAY WHEN YOU DRINK ALCOHOL HOW MANY DRINKS DO YOU HAVE: 0
TOTAL SCORE: 0
DOES PATIENT WANT TO STOP DRINKING: NO
EVER FELT BAD OR GUILTY ABOUT YOUR DRINKING: NO
HAVE YOU EVER FELT YOU SHOULD CUT DOWN ON YOUR DRINKING: NO

## 2021-11-27 NOTE — ED PROVIDER NOTES
ED Provider Note     11/27/2021  2:43 AM    Means of Arrival: Walk In  History obtained by: patient  Limitations: None  PCP: Isabella White  CODE STATUS: Full    CHIEF COMPLAINT  Chief Complaint   Patient presents with   • Flank Pain     L sided, onset this morning       HPI  Radha Marlow is a 51 y.o. female with hyperlipidemia, diabetes, increased BMI who presents with concerns of left-sided lower back pain and flank pain starting in the past 24 hours.  She and her  flew back to [place] from [place] this morning.  They were in [place] for the holiday.  She noticed that there was a discomfort in her lower back earlier in the morning.  She described as a dull ache.  Pain stayed about the same until this evening when she was laying down.  She fell asleep at 8 PM and when she awoke just after midnight, the pain was more intense.  She could not find a comfortable position.  Pain is radiating more to the flank area.  No pain with urination.  No frequency.  She has not noticed any blood in her urine.  No history of kidney stones.  No chest pains.  No shortness of breath.  She took ibuprofen 2 hours ago and it did not provide much relief.    REVIEW OF SYSTEMS  Review of Systems   All other systems reviewed and are negative.    See HPI for further details.    PAST MEDICAL HISTORY   has a past medical history of Allergy, Anemia, Arthritis, Hyperlipidemia, Hypertriglyceridemia (3/17/2010), and Obesity (BMI 35.0-39.9 without comorbidity) ([facility]) (3/27/2019).    FAMILY HISTORY  Family History   Problem Relation Age of Onset   • Diabetes Mother    • Heart Disease Father    • Hypertension Father    • Hyperlipidemia Father    • Diabetes Brother    • Heart Disease Brother    • Hypertension Brother    • Hyperlipidemia Brother    • Diabetes Maternal Aunt    • Diabetes Maternal Uncle    • Cancer Maternal Grandmother         Cervical/ovarian   • Diabetes Maternal Grandfather    • Heart Disease Maternal Grandfather    • Hypertension  "Maternal Grandfather    • Hyperlipidemia Maternal Grandfather    • Stroke Maternal Grandfather    • Stroke Paternal Grandmother    • Hyperlipidemia Paternal Grandmother    • Hypertension Paternal Grandmother    • Heart Disease Paternal Grandmother    • Heart Disease Paternal Grandfather    • Hypertension Paternal Grandfather    • Hyperlipidemia Paternal Grandfather        SOCIAL HISTORY  Social History     Tobacco Use   • Smoking status: Former Smoker     Packs/day: 0.25     Years: 1.00     Pack years: 0.25     Types: Cigarettes     Quit date: 1991     Years since quittin.9   • Smokeless tobacco: Never Used   Vaping Use   • Vaping Use: Never used   Substance and Sexual Activity   • Alcohol use: No     Alcohol/week: 0.0 oz     Comment: [employer] prohibits   • Drug use: No   • Sexual activity: Yes     Partners: Male     Birth control/protection: Surgical       SURGICAL HISTORY  patient denies any surgical history    CURRENT MEDICATIONS  Home Medications     Reviewed by Diane Allred R.N. (Registered Nurse) on 21 at 0143  Med List Status: Partial   Medication Last Dose Status   Alcohol Swabs  Active   atorvastatin (LIPITOR) 20 MG Tab  Active   Blood Glucose Meter Kit  Active   Blood Glucose Test Strips  Active   Cholecalciferol (VITAMIN D) 2000 UNITS Cap  Active   doxycycline (VIBRAMYCIN) 100 MG Tab  Active   Empagliflozin 10 MG Tab  Active   ibuprofen (MOTRIN) 800 MG Tab  Active   Lancets  Active   metFORMIN ER (GLUCOPHAGE XR) 500 MG TABLET SR 24 HR  Active   metronidazole (METROCREAM) 0.75 % cream  Active   Multiple Vitamin (DAILY VITAMINS PO)  Active   terbinafine (LAMISIL) 250 MG Tab  Active                ALLERGIES  Allergies   Allergen Reactions   • Bactrim [Sulfamethoxazole W-Trimethoprim] Unspecified     Rash        PHYSICAL EXAM  VITAL SIGNS: /72   Pulse (!) 56   Temp 36.2 °C (97.2 °F) (Temporal)   Resp 16   Ht 1.626 m (5' 4\")   Wt 104 kg (230 lb)   LMP 10/27/2021 (Approximate)   " SpO2 94%   BMI 39.48 kg/m²     Pulse ox interpretation: I interpret this pulse ox as normal.  Constitutional: Alert in no apparent distress.  HENT: No signs of trauma, Bilateral external ears normal, Nose normal.   Eyes: Pupils are equal, Conjunctiva normal, Non-icteric.   Neck: Normal range of motion, No tenderness, Supple, No stridor.   Lymphatic: No lymphadenopathy noted.   Cardiovascular: Regular rate and rhythm, no murmurs. Symmetric distal pulses. No cyanosis of extremities. No peripheral edema of extremities.  Thorax & Lungs: Normal breath sounds, No respiratory distress, No wheezing, No chest tenderness.   Abdomen: Soft, No tenderness, No masses, No pulsatile masses. No peritoneal signs.  Skin: Warm, Dry, No erythema, No rash.   Back: No midline bony tenderness, No CVA tenderness.   Musculoskeletal: No midline spine pain. Tenderness at paralumbar region on left. No worsening with percussion. Pain mild to palpation. Full range of motion of extremities without limitations. Ranging legs does not worsen pain.   Neurologic: Alert , Normal motor function, Normal sensory function, No focal deficits noted.   Psychiatric: Affect normal, Judgment normal, Mood normal.   Physical Exam      DIAGNOSTIC STUDIES / PROCEDURES      LABS  Pertinent Labs & Imaging studies reviewed. (See chart for details)    RADIOLOGY  Pertinent Labs & Imaging studies reviewed. (See chart for details)    COURSE & MEDICAL DECISION MAKING  Pertinent Labs & Imaging studies reviewed. (See chart for details)    2:43 AM This is an emergent evaluation of a  51 y.o. female who presents with with acute onset lower back pain nearly 24 hours ago. Physical exam significant for mild tenderness at the left paraspinal region, otherwise normal exam.  The differential diagnosis includes but is not limited to lumbar strain, renal stone, UTI, low suspicion for GI etiology since no nausea vomiting or diarrhea or abdominal pain.. Ordered for urinalysis, CBC, CMP,  CT renal stone study to evaluate. Patient will be treated with Tylenol and Robaxin for her symptoms.     Normal CBC, normal metabolic panel.  Urinalysis without blood or signs of infection.  Significant amount of glucose in the urine.  She received medication approximately 30 minutes ago and has not yet felt any relief.  CT did not reveal any evidence of stones, intestinal inflammation or other abnormalities.  Unclear exact cause of her pain.  She understands that we do not know what the cause of the pain is but it may be due to muscle strain.  Pain could also correlate with menstrual cycle.  She was agreeable with plan for discharge.  She knows to return if she develops fever frequent vomiting or other serious concerns.      The patient will return for worsening symptoms and is stable at the time of discharge. The patient verbalizes understanding. Guidance was provided on appropriate use of medications including driving under the influence, overdose, and side effects.         FINAL IMPRESSION    ICD-10-CM   1. Acute left-sided low back pain without sciatica Active M54.50            This dictation was created using voice recognition software. The accuracy of the dictation is limited to the abilities of the software. I expect there may be some errors of grammar and possibly content. The nursing notes were reviewed and certain aspects of this information were incorporated into this note.    Electronically signed by: Juan Lees II, M.D., 11/27/2021 2:43 AM

## 2021-11-27 NOTE — ED NOTES
Pt resting in gurCarney, respirations even, educated on medications, no additional needs at this time.

## 2021-11-27 NOTE — ED TRIAGE NOTES
Radha Marlow  51 y.o.  Chief Complaint   Patient presents with   • Flank Pain     L sided, onset this morning     Patient to triage for above. States pain occ radiates to L lower abdomen. Denies n/v/d or pain with urination.    Hx DM2, takes metformin. FSBG 134 in triage.     Triage process explained to patient, apologized for wait time, and returned to lobby.  Pt informed to notify staff of any change in condition. NAD at this time.

## 2021-11-28 DIAGNOSIS — E11.65 TYPE 2 DIABETES MELLITUS WITH HYPERGLYCEMIA, WITHOUT LONG-TERM CURRENT USE OF INSULIN (HCC): ICD-10-CM

## 2021-11-29 DIAGNOSIS — E11.69 HYPERLIPIDEMIA ASSOCIATED WITH TYPE 2 DIABETES MELLITUS (HCC): ICD-10-CM

## 2021-11-29 DIAGNOSIS — E78.5 HYPERLIPIDEMIA ASSOCIATED WITH TYPE 2 DIABETES MELLITUS (HCC): ICD-10-CM

## 2021-11-29 DIAGNOSIS — E11.65 TYPE 2 DIABETES MELLITUS WITH HYPERGLYCEMIA, WITHOUT LONG-TERM CURRENT USE OF INSULIN (HCC): ICD-10-CM

## 2021-11-29 DIAGNOSIS — E66.01 CLASS 3 SEVERE OBESITY DUE TO EXCESS CALORIES WITH SERIOUS COMORBIDITY AND BODY MASS INDEX (BMI) OF 40.0 TO 44.9 IN ADULT (HCC): ICD-10-CM

## 2021-11-30 RX ORDER — METFORMIN HYDROCHLORIDE 500 MG/1
1000 TABLET, EXTENDED RELEASE ORAL 2 TIMES DAILY
Qty: 360 TABLET | Refills: 0 | Status: SHIPPED | OUTPATIENT
Start: 2021-11-30 | End: 2022-03-01 | Stop reason: SDUPTHER

## 2021-11-30 RX ORDER — EMPAGLIFLOZIN 10 MG/1
TABLET, FILM COATED ORAL
Qty: 90 TABLET | Refills: 0 | Status: SHIPPED | OUTPATIENT
Start: 2021-11-30 | End: 2022-03-01 | Stop reason: SDUPTHER

## 2021-11-30 RX ORDER — ATORVASTATIN CALCIUM 20 MG/1
TABLET, FILM COATED ORAL
Qty: 90 TABLET | Refills: 0 | Status: SHIPPED | OUTPATIENT
Start: 2021-11-30 | End: 2022-03-01 | Stop reason: SDUPTHER

## 2021-12-08 ENCOUNTER — OFFICE VISIT (OUTPATIENT)
Dept: MEDICAL GROUP | Facility: PHYSICIAN GROUP | Age: 51
End: 2021-12-08
Payer: COMMERCIAL

## 2021-12-08 ENCOUNTER — HOSPITAL ENCOUNTER (OUTPATIENT)
Dept: LAB | Facility: MEDICAL CENTER | Age: 51
End: 2021-12-08
Attending: NURSE PRACTITIONER
Payer: COMMERCIAL

## 2021-12-08 VITALS
HEIGHT: 64 IN | TEMPERATURE: 97 F | BODY MASS INDEX: 38.93 KG/M2 | DIASTOLIC BLOOD PRESSURE: 78 MMHG | HEART RATE: 68 BPM | RESPIRATION RATE: 12 BRPM | WEIGHT: 228 LBS | SYSTOLIC BLOOD PRESSURE: 116 MMHG | OXYGEN SATURATION: 98 %

## 2021-12-08 DIAGNOSIS — B35.1 ONYCHOMYCOSIS: ICD-10-CM

## 2021-12-08 DIAGNOSIS — E11.65 TYPE 2 DIABETES MELLITUS WITH HYPERGLYCEMIA, WITHOUT LONG-TERM CURRENT USE OF INSULIN (HCC): ICD-10-CM

## 2021-12-08 DIAGNOSIS — M54.50 ACUTE LEFT-SIDED LOW BACK PAIN WITHOUT SCIATICA: ICD-10-CM

## 2021-12-08 DIAGNOSIS — Z56.6 STRESS AT WORK: ICD-10-CM

## 2021-12-08 DIAGNOSIS — K76.0 NONALCOHOLIC FATTY LIVER DISEASE: ICD-10-CM

## 2021-12-08 LAB
ALBUMIN SERPL BCP-MCNC: 4.7 G/DL (ref 3.2–4.9)
ALBUMIN/GLOB SERPL: 1.7 G/DL
ALP SERPL-CCNC: 64 U/L (ref 30–99)
ALT SERPL-CCNC: 57 U/L (ref 2–50)
ANION GAP SERPL CALC-SCNC: 12 MMOL/L (ref 7–16)
AST SERPL-CCNC: 39 U/L (ref 12–45)
BILIRUB SERPL-MCNC: 0.5 MG/DL (ref 0.1–1.5)
BUN SERPL-MCNC: 19 MG/DL (ref 8–22)
CALCIUM SERPL-MCNC: 9.6 MG/DL (ref 8.5–10.5)
CHLORIDE SERPL-SCNC: 103 MMOL/L (ref 96–112)
CO2 SERPL-SCNC: 23 MMOL/L (ref 20–33)
CREAT SERPL-MCNC: 0.67 MG/DL (ref 0.5–1.4)
EST. AVERAGE GLUCOSE BLD GHB EST-MCNC: 157 MG/DL
GLOBULIN SER CALC-MCNC: 2.7 G/DL (ref 1.9–3.5)
GLUCOSE SERPL-MCNC: 145 MG/DL (ref 65–99)
HBA1C MFR BLD: 7.1 % (ref 4–5.6)
POTASSIUM SERPL-SCNC: 4.5 MMOL/L (ref 3.6–5.5)
PROT SERPL-MCNC: 7.4 G/DL (ref 6–8.2)
SODIUM SERPL-SCNC: 138 MMOL/L (ref 135–145)

## 2021-12-08 PROCEDURE — 99214 OFFICE O/P EST MOD 30 MIN: CPT | Performed by: NURSE PRACTITIONER

## 2021-12-08 PROCEDURE — 83036 HEMOGLOBIN GLYCOSYLATED A1C: CPT

## 2021-12-08 PROCEDURE — 80053 COMPREHEN METABOLIC PANEL: CPT

## 2021-12-08 PROCEDURE — 36415 COLL VENOUS BLD VENIPUNCTURE: CPT

## 2021-12-08 RX ORDER — DOXYCYCLINE HYCLATE 100 MG/1
CAPSULE ORAL
COMMUNITY
Start: 2021-11-27 | End: 2022-11-01

## 2021-12-08 RX ORDER — BLOOD SUGAR DIAGNOSTIC
STRIP MISCELLANEOUS
COMMUNITY
Start: 2021-11-13 | End: 2022-01-14

## 2021-12-08 SDOH — HEALTH STABILITY - MENTAL HEALTH: OTHER PHYSICAL AND MENTAL STRAIN RELATED TO WORK: Z56.6

## 2021-12-08 ASSESSMENT — FIBROSIS 4 INDEX: FIB4 SCORE: 1.18

## 2021-12-08 NOTE — ASSESSMENT & PLAN NOTE
This is an acute condition - stable.  She reports overall improvement in her back pain.  CT scan does show DDD, and is wondering how to improve.  She did take methocarbamol once since her ED visit; continues to take ibuprofen as needed for pain relief.  > Discussed supportive measures at home for back pain. Discussed etiology of degenerative disc disease.

## 2021-12-08 NOTE — ASSESSMENT & PLAN NOTE
This is a chronic condition, not controlled. She reports fasting glucose 126-150.  She reports that she has been under a lot of stress at work; and feels exhausted after work and feels very depressed. She continues to work on diet and physical activity. Unfortunately, with the stress at work, she sometimes does not have the opportunity to sit and eat lunch and is very exhausted that she cannot eat dinner at night. She denies polyuria, polydipsia, numbness/tingling, or blurred vision.  > Provided counseling in regards to stress at work. Continue Metformin 1000 mg twice a day, Jardiance 10 mg daily. Discussed with patient that certainly stress may be contributing to her sugar levels, but her reported fasting glucose levels are reassuring that there is improvement in her diabetic management.  She will obtain fasting glucose and A1c after visit today.

## 2021-12-08 NOTE — ASSESSMENT & PLAN NOTE
This is a chronic condition, stable. Completed terbinafine treatment. She reports that fungal infection has resolved.  > Recheck metabolic panel given nonalcoholic fatty liver disease history.

## 2021-12-08 NOTE — PROGRESS NOTES
Subjective  Chief Complaint  Chief Complaint   Patient presents with   • Follow-Up     History of Present Illness  Radha presents today with the following.  Problem   Acute Left-Sided Low Back Pain Without Sciatica   Onychomycosis   Nonalcoholic Fatty Liver Disease   Type 2 diabetes mellitus with hyperglycemia, without long-term current use of insulin ([facility])     Past Medical History    Allergies: Bactrim [sulfamethoxazole w-trimethoprim]  Past Medical History:   Diagnosis Date   • Allergy    • Anemia    • Arthritis    • Hyperlipidemia    • Hypertriglyceridemia 3/17/2010   • Obesity (BMI 35.0-39.9 without comorbidity) ([facility]) 3/27/2019     Current Outpatient Medications Ordered in Epic   Medication Sig Dispense Refill   • doxycycline (VIBRAMYCIN) 100 MG Cap      • ONETOUCH ULTRA strip      • metFORMIN ER (GLUCOPHAGE XR) 500 MG TABLET SR 24 HR TAKE 2 TABLETS BY MOUTH 2 TIMES A DAY. 360 Tablet 0   • atorvastatin (LIPITOR) 20 MG Tab TAKE 1 TABLET BY MOUTH EVERY DAY 90 Tablet 0   • JARDIANCE 10 MG Tab TAKE 1 TABLET BY MOUTH EVERY DAY 90 Tablet 0   • methocarbamol (ROBAXIN) 750 MG Tab Take 2 Tablets by mouth 3 times a day as needed (MUSCLE SPASM). Do not drive or operate potentially dangerous equipment when taking 30 Tablet 0   • Blood Glucose Meter Kit Test blood sugar as recommended by provider. Insurance preferred blood glucose monitoring kit. 1 Kit 0   • Blood Glucose Test Strips Use one insurance preferred strip to test blood sugar twice daily. 100 Strip 3   • Lancets Use one insurance preferred lancet to test blood sugar twice daily. 100 Each 3   • Alcohol Swabs Wipe site with prep pad prior to injection. 100 Each 3   • ibuprofen (MOTRIN) 800 MG Tab Take 1 Tab by mouth every 8 hours as needed. 30 Tab 1   • metronidazole (METROCREAM) 0.75 % cream Apply 5 mg to affected area(s) 2 times a day. 45 g 4   • Cholecalciferol (VITAMIN D) 2000 UNITS Cap Take  by mouth.     • Multiple Vitamin (DAILY VITAMINS PO) Take  by mouth.    "    No current [facility]-ordered facility-administered medications on file.     Review of Systems  See below.     Objective  Physical Exam  /78 (BP Location: Left arm, Patient Position: Sitting, BP Cuff Size: Large adult)   Pulse 68   Temp 36.1 °C (97 °F) (Temporal)   Resp 12   Ht 1.626 m (5' 4\")   Wt 103 kg (228 lb)   SpO2 98%  Body mass index is 39.14 kg/m².  General: Alert, no distress, well-groomed.  Skin: Warm, dry, good turgor, no rashes in visible areas.  Respiratory: Unlabored respiratory effort, no cough.  Musculoskeletal: Normal gait, moves all extremities.  Neuro: Grossly non-focal.   Psych: Alert and oriented x3, normal affect and mood.    Assessment/Plan  51 y.o. female with the following issues.    1. Type 2 diabetes mellitus with hyperglycemia, without long-term current use of insulin (HCC)  Comp Metabolic Panel    HEMOGLOBIN A1C   2. Nonalcoholic fatty liver disease  Comp Metabolic Panel   3. Onychomycosis  Comp Metabolic Panel   4. Acute left-sided low back pain without sciatica     5. Stress at work        Type 2 diabetes mellitus with hyperglycemia, without long-term current use of insulin (HCC)  This is a chronic condition, not controlled. She reports fasting glucose 126-150.  She reports that she has been under a lot of stress at work; and feels exhausted after work and feels very depressed. She continues to work on diet and physical activity. Unfortunately, with the stress at work, she sometimes does not have the opportunity to sit and eat lunch and is very exhausted that she cannot eat dinner at night. She denies polyuria, polydipsia, numbness/tingling, or blurred vision.  > Provided counseling in regards to stress at work. Continue Metformin 1000 mg twice a day, Jardiance 10 mg daily. Discussed with patient that certainly stress may be contributing to her sugar levels, but her reported fasting glucose levels are reassuring that there is improvement in her diabetic management.  She will " obtain fasting glucose and A1c after visit today.     Acute left-sided low back pain without sciatica  This is an acute condition - stable.  She reports overall improvement in her back pain.  CT scan does show DDD, and is wondering how to improve.  She did take methocarbamol once since her ED visit; continues to take ibuprofen as needed for pain relief.  > Discussed supportive measures at home for back pain. Discussed etiology of degenerative disc disease.    Nonalcoholic fatty liver disease  This is a chronic condition, not controlled-improving.  > Recheck metabolic panel. Recently completed terbinafine for onychomycosis.     Onychomycosis  This is a chronic condition, stable. Completed terbinafine treatment. She reports that fungal infection has resolved.  > Recheck metabolic panel given nonalcoholic fatty liver disease history.    Stress at work  See above.  Offered counseling assistance; declines at this time.    Return in about 6 months (around 6/8/2022), or if symptoms worsen or fail to improve, for diabetes.    I have placed the below orders and discussed them with an approved delegating provider.  The MA is performing the below orders under the direction of Dr. Be.    Please note that this dictation was created using voice recognition software. I have worked with consultants from the vendor as well as technical experts from [facility] to optimize the interface. I have made every reasonable attempt to correct obvious errors, but I expect that there are errors of grammar and possibly content that I did not discover before finalizing the note.    KATHLEEN Manzano  [facility] [place]

## 2021-12-08 NOTE — ASSESSMENT & PLAN NOTE
This is a chronic condition, not controlled-improving.  > Recheck metabolic panel. Recently completed terbinafine for onychomycosis.

## 2022-01-14 RX ORDER — BLOOD SUGAR DIAGNOSTIC
STRIP MISCELLANEOUS
Qty: 100 STRIP | Refills: 3 | Status: SHIPPED | OUTPATIENT
Start: 2022-01-14

## 2022-03-01 DIAGNOSIS — E78.5 HYPERLIPIDEMIA ASSOCIATED WITH TYPE 2 DIABETES MELLITUS (HCC): ICD-10-CM

## 2022-03-01 DIAGNOSIS — E11.69 HYPERLIPIDEMIA ASSOCIATED WITH TYPE 2 DIABETES MELLITUS (HCC): ICD-10-CM

## 2022-03-01 DIAGNOSIS — E11.65 TYPE 2 DIABETES MELLITUS WITH HYPERGLYCEMIA, WITHOUT LONG-TERM CURRENT USE OF INSULIN (HCC): ICD-10-CM

## 2022-03-01 DIAGNOSIS — E66.01 CLASS 3 SEVERE OBESITY DUE TO EXCESS CALORIES WITH SERIOUS COMORBIDITY AND BODY MASS INDEX (BMI) OF 40.0 TO 44.9 IN ADULT (HCC): ICD-10-CM

## 2022-03-01 RX ORDER — METFORMIN HYDROCHLORIDE 500 MG/1
1000 TABLET, EXTENDED RELEASE ORAL 2 TIMES DAILY
Qty: 360 TABLET | Refills: 0 | Status: SHIPPED | OUTPATIENT
Start: 2022-03-01 | End: 2022-08-29

## 2022-03-01 RX ORDER — ATORVASTATIN CALCIUM 20 MG/1
20 TABLET, FILM COATED ORAL
Qty: 90 TABLET | Refills: 0 | Status: SHIPPED | OUTPATIENT
Start: 2022-03-01 | End: 2022-09-17 | Stop reason: SDUPTHER

## 2022-03-01 RX ORDER — EMPAGLIFLOZIN 10 MG/1
1 TABLET, FILM COATED ORAL
Qty: 90 TABLET | Refills: 0 | Status: SHIPPED | OUTPATIENT
Start: 2022-03-01 | End: 2022-06-03

## 2022-03-22 SDOH — ECONOMIC STABILITY: TRANSPORTATION INSECURITY
IN THE PAST 12 MONTHS, HAS THE LACK OF TRANSPORTATION KEPT YOU FROM MEDICAL APPOINTMENTS OR FROM GETTING MEDICATIONS?: NO

## 2022-03-22 SDOH — ECONOMIC STABILITY: HOUSING INSECURITY: IN THE LAST 12 MONTHS, HOW MANY PLACES HAVE YOU LIVED?: 1

## 2022-03-22 SDOH — HEALTH STABILITY: MENTAL HEALTH
STRESS IS WHEN SOMEONE FEELS TENSE, NERVOUS, ANXIOUS, OR CAN'T SLEEP AT NIGHT BECAUSE THEIR MIND IS TROUBLED. HOW STRESSED ARE YOU?: VERY MUCH

## 2022-03-22 SDOH — HEALTH STABILITY: PHYSICAL HEALTH: ON AVERAGE, HOW MANY MINUTES DO YOU ENGAGE IN EXERCISE AT THIS LEVEL?: 30 MIN

## 2022-03-22 SDOH — ECONOMIC STABILITY: INCOME INSECURITY: IN THE LAST 12 MONTHS, WAS THERE A TIME WHEN YOU WERE NOT ABLE TO PAY THE MORTGAGE OR RENT ON TIME?: NO

## 2022-03-22 SDOH — ECONOMIC STABILITY: INCOME INSECURITY: HOW HARD IS IT FOR YOU TO PAY FOR THE VERY BASICS LIKE FOOD, HOUSING, MEDICAL CARE, AND HEATING?: NOT HARD AT ALL

## 2022-03-22 SDOH — ECONOMIC STABILITY: HOUSING INSECURITY
IN THE LAST 12 MONTHS, WAS THERE A TIME WHEN YOU DID NOT HAVE A STEADY PLACE TO SLEEP OR SLEPT IN A SHELTER (INCLUDING NOW)?: NO

## 2022-03-22 SDOH — ECONOMIC STABILITY: FOOD INSECURITY: WITHIN THE PAST 12 MONTHS, YOU WORRIED THAT YOUR FOOD WOULD RUN OUT BEFORE YOU GOT MONEY TO BUY MORE.: NEVER TRUE

## 2022-03-22 SDOH — HEALTH STABILITY: PHYSICAL HEALTH: ON AVERAGE, HOW MANY DAYS PER WEEK DO YOU ENGAGE IN MODERATE TO STRENUOUS EXERCISE (LIKE A BRISK WALK)?: 3 DAYS

## 2022-03-22 SDOH — ECONOMIC STABILITY: FOOD INSECURITY: WITHIN THE PAST 12 MONTHS, THE FOOD YOU BOUGHT JUST DIDN'T LAST AND YOU DIDN'T HAVE MONEY TO GET MORE.: NEVER TRUE

## 2022-03-22 SDOH — ECONOMIC STABILITY: TRANSPORTATION INSECURITY
IN THE PAST 12 MONTHS, HAS LACK OF TRANSPORTATION KEPT YOU FROM MEETINGS, WORK, OR FROM GETTING THINGS NEEDED FOR DAILY LIVING?: NO

## 2022-03-22 SDOH — ECONOMIC STABILITY: TRANSPORTATION INSECURITY
IN THE PAST 12 MONTHS, HAS LACK OF RELIABLE TRANSPORTATION KEPT YOU FROM MEDICAL APPOINTMENTS, MEETINGS, WORK OR FROM GETTING THINGS NEEDED FOR DAILY LIVING?: NO

## 2022-03-22 ASSESSMENT — SOCIAL DETERMINANTS OF HEALTH (SDOH)
HOW HARD IS IT FOR YOU TO PAY FOR THE VERY BASICS LIKE FOOD, HOUSING, MEDICAL CARE, AND HEATING?: NOT HARD AT ALL
HOW OFTEN DO YOU HAVE SIX OR MORE DRINKS ON ONE OCCASION: NEVER
HOW OFTEN DO YOU GET TOGETHER WITH FRIENDS OR RELATIVES?: ONCE A WEEK
HOW OFTEN DO YOU ATTEND CHURCH OR RELIGIOUS SERVICES?: MORE THAN 4 TIMES PER YEAR
HOW OFTEN DO YOU ATTENT MEETINGS OF THE CLUB OR ORGANIZATION YOU BELONG TO?: MORE THAN 4 TIMES PER YEAR
DO YOU BELONG TO ANY CLUBS OR ORGANIZATIONS SUCH AS CHURCH GROUPS UNIONS, FRATERNAL OR ATHLETIC GROUPS, OR SCHOOL GROUPS?: YES
IN A TYPICAL WEEK, HOW MANY TIMES DO YOU TALK ON THE PHONE WITH FAMILY, FRIENDS, OR NEIGHBORS?: MORE THAN THREE TIMES A WEEK
HOW OFTEN DO YOU ATTENT MEETINGS OF THE CLUB OR ORGANIZATION YOU BELONG TO?: MORE THAN 4 TIMES PER YEAR
HOW OFTEN DO YOU ATTEND CHURCH OR RELIGIOUS SERVICES?: MORE THAN 4 TIMES PER YEAR
WITHIN THE PAST 12 MONTHS, YOU WORRIED THAT YOUR FOOD WOULD RUN OUT BEFORE YOU GOT THE MONEY TO BUY MORE: NEVER TRUE
HOW OFTEN DO YOU HAVE A DRINK CONTAINING ALCOHOL: NEVER
DO YOU BELONG TO ANY CLUBS OR ORGANIZATIONS SUCH AS CHURCH GROUPS UNIONS, FRATERNAL OR ATHLETIC GROUPS, OR SCHOOL GROUPS?: YES
IN A TYPICAL WEEK, HOW MANY TIMES DO YOU TALK ON THE PHONE WITH FAMILY, FRIENDS, OR NEIGHBORS?: MORE THAN THREE TIMES A WEEK
HOW OFTEN DO YOU GET TOGETHER WITH FRIENDS OR RELATIVES?: ONCE A WEEK

## 2022-03-22 ASSESSMENT — LIFESTYLE VARIABLES
HOW OFTEN DO YOU HAVE A DRINK CONTAINING ALCOHOL: NEVER
HOW OFTEN DO YOU HAVE SIX OR MORE DRINKS ON ONE OCCASION: NEVER

## 2022-03-25 ENCOUNTER — OFFICE VISIT (OUTPATIENT)
Dept: MEDICAL GROUP | Facility: PHYSICIAN GROUP | Age: 52
End: 2022-03-25
Payer: COMMERCIAL

## 2022-03-25 ENCOUNTER — HOSPITAL ENCOUNTER (OUTPATIENT)
Facility: MEDICAL CENTER | Age: 52
End: 2022-03-25
Payer: COMMERCIAL

## 2022-03-25 VITALS
SYSTOLIC BLOOD PRESSURE: 124 MMHG | DIASTOLIC BLOOD PRESSURE: 76 MMHG | HEART RATE: 86 BPM | TEMPERATURE: 97.8 F | HEIGHT: 64 IN | BODY MASS INDEX: 38.93 KG/M2 | RESPIRATION RATE: 20 BRPM | WEIGHT: 228 LBS | OXYGEN SATURATION: 93 %

## 2022-03-25 DIAGNOSIS — N76.0 ACUTE VAGINITIS: ICD-10-CM

## 2022-03-25 DIAGNOSIS — Z00.00 ENCOUNTER FOR MEDICAL EXAMINATION TO ESTABLISH CARE: ICD-10-CM

## 2022-03-25 DIAGNOSIS — E11.65 TYPE 2 DIABETES MELLITUS WITH HYPERGLYCEMIA, WITHOUT LONG-TERM CURRENT USE OF INSULIN (HCC): ICD-10-CM

## 2022-03-25 DIAGNOSIS — R10.11 RIGHT UPPER QUADRANT PAIN: ICD-10-CM

## 2022-03-25 DIAGNOSIS — E11.69 HYPERLIPIDEMIA ASSOCIATED WITH TYPE 2 DIABETES MELLITUS (HCC): ICD-10-CM

## 2022-03-25 DIAGNOSIS — E78.5 HYPERLIPIDEMIA ASSOCIATED WITH TYPE 2 DIABETES MELLITUS (HCC): ICD-10-CM

## 2022-03-25 LAB
CANDIDA DNA VAG QL PROBE+SIG AMP: NEGATIVE
G VAGINALIS DNA VAG QL PROBE+SIG AMP: NEGATIVE
T VAGINALIS DNA VAG QL PROBE+SIG AMP: NEGATIVE

## 2022-03-25 PROCEDURE — 87660 TRICHOMONAS VAGIN DIR PROBE: CPT

## 2022-03-25 PROCEDURE — 87510 GARDNER VAG DNA DIR PROBE: CPT

## 2022-03-25 PROCEDURE — 99214 OFFICE O/P EST MOD 30 MIN: CPT

## 2022-03-25 PROCEDURE — 87480 CANDIDA DNA DIR PROBE: CPT

## 2022-03-25 RX ORDER — CLOBETASOL PROPIONATE 0.5 MG/G
1 OINTMENT TOPICAL 2 TIMES DAILY
Qty: 60 G | Refills: 0 | Status: SHIPPED | OUTPATIENT
Start: 2022-03-25 | End: 2022-04-08

## 2022-03-25 RX ORDER — FLUCONAZOLE 150 MG/1
150 TABLET ORAL DAILY
Qty: 1 TABLET | Refills: 1 | Status: SHIPPED | OUTPATIENT
Start: 2022-03-25 | End: 2022-03-26

## 2022-03-25 ASSESSMENT — PATIENT HEALTH QUESTIONNAIRE - PHQ9: CLINICAL INTERPRETATION OF PHQ2 SCORE: 1

## 2022-03-25 ASSESSMENT — FIBROSIS 4 INDEX: FIB4 SCORE: 1.18

## 2022-03-25 NOTE — PROGRESS NOTES
"CC:   Chief Complaint   Patient presents with   • Establish Care   • Vaginitis     X 3 weeks itching and yeast        HISTORY OF PRESENT ILLNESS: Patient is a 51 y.o. female established patient who presents today to discuss the following problems below:     Acute vaginitis  Patient reports that over the last couple of weeks she has been experiencing some vaginitis type symptoms with external itching and discomfort.  She reports that whenever she is toilet paper, she gets very uncomfortable raw and tearing sensation.  She denies any discharge or odors.  No burning with urination.  She has not had any new sexual partners    Right upper quadrant pain  Patient reports intermittent episodes of right upper quadrant pain.  She is not sure if it is stress or food related, but it is very intermittent and difficult to reproduce with palpation.  She denies any nausea or vomiting, constipation or diarrhea issues    Type 2 diabetes mellitus with hyperglycemia, without long-term current use of insulin ([facility])  Patient is currently on Jardiance 10 mg daily as well as Metformin 1000 mg twice daily.  She tolerates this well.  She does have concerns that maybe the Jardiance is caused a secondary yeast infection.  However, she does note that since she has been on the Jardiance she has not had any nighttime cravings and has lost weight from 245 pounds to 225 pounds    Past Medical History:   Diagnosis Date   • Allergy    • Anemia    • Arthritis    • Hyperlipidemia    • Hypertriglyceridemia 3/17/2010   • Obesity (BMI 35.0-39.9 without comorbidity) ([facility]) 3/27/2019       Allergies:Bactrim [sulfamethoxazole w-trimethoprim]    Review of Systems: Otherwise negative except for as stated above.      Exam: /76 (BP Location: Right arm, Patient Position: Sitting, BP Cuff Size: Large adult)   Pulse 86   Temp 36.6 °C (97.8 °F) (Temporal)   Resp 20   Ht 1.626 m (5' 4\")   Wt 103 kg (228 lb)   SpO2 93%  Body mass index is 39.14 " kg/m².    Gen: Alert and oriented x4. Well developed, well-nourished female in no apparent distress.  Skin: Warm, dry, good turgor, no rashes in visible areas or lacerations appreciated.   Eye: EOM intact, pupils equal, round and reactive, conjunctiva clear, lids normal.  Neck: Trachea midline, no masses, no thyromegaly  Lungs: Normal effort, CTA bilaterally, no wheezes, rhonchi, or rales. No stridor or audible wheezing. Equal chest expansion.   CV: Regular rate and rhythm. No murmurs, rubs, or gallops.  GI:  Soft, non-tender abdomen with no distention.   : External labia and vulva erythematous, with no discharge, lesions or rashes  MSK: Normal gait, moves all extremities.  Neuro: Alert and oriented x 4, non-focal exam with motor and sensory grossly intact.  Ext: No clubbing, cyanosis, edema.  Psych: Normal behavior, affect and mood.      Assessment/Plan:  51 y.o. female with the following -    1. Acute vaginitis  Acute condition.  Obtain vaginal pathogens panel.  Suspect likely yeast infection, treat with fluconazole.  Labia is very erythematous and tender, recommended topical clobetasol ointment twice daily for 14 days  - VAGINAL PATHOGENS DNA PANEL; Future  - clobetasol (TEMOVATE) 0.05 % Ointment; Apply 1 Application topically 2 times a day for 14 days.  Dispense: 60 g; Refill: 0  - fluconazole (DIFLUCAN) 150 MG tablet; Take 1 Tablet by mouth every day for 1 day. Okay to repeat one week later if symptoms persist  Dispense: 1 Tablet; Refill: 1    2. Hyperlipidemia associated with type 2 diabetes mellitus (HCC)  Chronic condition.  Patient continues on atorvastatin 20 mg nightly.  She is due for updated lipids  - Lipid Profile; Future    3. Type 2 diabetes mellitus with hyperglycemia, without long-term current use of insulin (HCC)  Chronic condition, stable.  Current A1c is well controlled at 7.1%.  Recheck CMP and A1c in 3 months  - Comp Metabolic Panel; Future  -A1c    4. Encounter for medical examination to  establish care  - TSH WITH REFLEX TO FT4; Future  - CBC WITHOUT DIFFERENTIAL; Future    5. Right upper quadrant pain  Advised patient to monitor her symptoms and notate if her intermittent right upper quadrant pain becomes persistent or is associated with food intake.  She does have a history of nonalcoholic fatty liver disease diagnosed on biopsy, but reports that this pain feels different than previous pains.    Follow-up: Return in about 10 weeks (around 6/3/2022) for labs .    Health Maintenance: Completed      Please note that this dictation was created using voice recognition software. I have made every reasonable attempt to correct obvious errors, but I expect that there are errors of grammar and possibly content that I did not discover before finalizing the note.    Electronically signed by SHOBHA Anaya on March 25, 2022

## 2022-03-25 NOTE — ASSESSMENT & PLAN NOTE
Patient is currently on Jardiance 10 mg daily as well as Metformin 1000 mg twice daily.  She tolerates this well.  She does have concerns that maybe the Jardiance is caused a secondary yeast infection.  However, she does note that since she has been on the Jardiance she has not had any nighttime cravings and has lost weight from 245 pounds to 225 pounds

## 2022-03-25 NOTE — ASSESSMENT & PLAN NOTE
Patient reports intermittent episodes of right upper quadrant pain.  She is not sure if it is stress or food related, but it is very intermittent and difficult to reproduce with palpation.  She denies any nausea or vomiting, constipation or diarrhea issues

## 2022-03-25 NOTE — ASSESSMENT & PLAN NOTE
Patient reports that over the last couple of weeks she has been experiencing some vaginitis type symptoms with external itching and discomfort.  She reports that whenever she is toilet paper, she gets very uncomfortable raw and tearing sensation.  She denies any discharge or odors.  No burning with urination.  She has not had any new sexual partners

## 2022-06-01 ENCOUNTER — TELEPHONE (OUTPATIENT)
Dept: MEDICAL GROUP | Facility: PHYSICIAN GROUP | Age: 52
End: 2022-06-01

## 2022-06-01 ENCOUNTER — HOSPITAL ENCOUNTER (OUTPATIENT)
Dept: LAB | Facility: MEDICAL CENTER | Age: 52
End: 2022-06-01
Payer: COMMERCIAL

## 2022-06-01 DIAGNOSIS — Z00.00 ENCOUNTER FOR MEDICAL EXAMINATION TO ESTABLISH CARE: ICD-10-CM

## 2022-06-01 DIAGNOSIS — E11.65 TYPE 2 DIABETES MELLITUS WITH HYPERGLYCEMIA, WITHOUT LONG-TERM CURRENT USE OF INSULIN (HCC): ICD-10-CM

## 2022-06-01 DIAGNOSIS — E11.69 HYPERLIPIDEMIA ASSOCIATED WITH TYPE 2 DIABETES MELLITUS (HCC): ICD-10-CM

## 2022-06-01 DIAGNOSIS — E78.5 HYPERLIPIDEMIA ASSOCIATED WITH TYPE 2 DIABETES MELLITUS (HCC): ICD-10-CM

## 2022-06-01 LAB
ALBUMIN SERPL BCP-MCNC: 4.3 G/DL (ref 3.2–4.9)
ALBUMIN/GLOB SERPL: 1.3 G/DL
ALP SERPL-CCNC: 65 U/L (ref 30–99)
ALT SERPL-CCNC: 65 U/L (ref 2–50)
ANION GAP SERPL CALC-SCNC: 14 MMOL/L (ref 7–16)
AST SERPL-CCNC: 43 U/L (ref 12–45)
BILIRUB SERPL-MCNC: 0.3 MG/DL (ref 0.1–1.5)
BUN SERPL-MCNC: 14 MG/DL (ref 8–22)
CALCIUM SERPL-MCNC: 9.2 MG/DL (ref 8.5–10.5)
CHLORIDE SERPL-SCNC: 101 MMOL/L (ref 96–112)
CHOLEST SERPL-MCNC: 162 MG/DL (ref 100–199)
CO2 SERPL-SCNC: 20 MMOL/L (ref 20–33)
CREAT SERPL-MCNC: 0.7 MG/DL (ref 0.5–1.4)
ERYTHROCYTE [DISTWIDTH] IN BLOOD BY AUTOMATED COUNT: 42.5 FL (ref 35.9–50)
EST. AVERAGE GLUCOSE BLD GHB EST-MCNC: 169 MG/DL
FASTING STATUS PATIENT QL REPORTED: NORMAL
GFR SERPLBLD CREATININE-BSD FMLA CKD-EPI: 105 ML/MIN/1.73 M 2
GLOBULIN SER CALC-MCNC: 3.2 G/DL (ref 1.9–3.5)
GLUCOSE SERPL-MCNC: 166 MG/DL (ref 65–99)
HBA1C MFR BLD: 7.5 % (ref 4–5.6)
HCT VFR BLD AUTO: 47.1 % (ref 37–47)
HDLC SERPL-MCNC: 48 MG/DL
HGB BLD-MCNC: 16.1 G/DL (ref 12–16)
LDLC SERPL CALC-MCNC: 88 MG/DL
MCH RBC QN AUTO: 31.4 PG (ref 27–33)
MCHC RBC AUTO-ENTMCNC: 34.2 G/DL (ref 33.6–35)
MCV RBC AUTO: 91.8 FL (ref 81.4–97.8)
PLATELET # BLD AUTO: 226 K/UL (ref 164–446)
PMV BLD AUTO: 10.3 FL (ref 9–12.9)
POTASSIUM SERPL-SCNC: 4.3 MMOL/L (ref 3.6–5.5)
PROT SERPL-MCNC: 7.5 G/DL (ref 6–8.2)
RBC # BLD AUTO: 5.13 M/UL (ref 4.2–5.4)
SODIUM SERPL-SCNC: 135 MMOL/L (ref 135–145)
TRIGL SERPL-MCNC: 131 MG/DL (ref 0–149)
TSH SERPL DL<=0.005 MIU/L-ACNC: 0.64 UIU/ML (ref 0.38–5.33)
WBC # BLD AUTO: 5.3 K/UL (ref 4.8–10.8)

## 2022-06-01 PROCEDURE — 85027 COMPLETE CBC AUTOMATED: CPT

## 2022-06-01 PROCEDURE — 83036 HEMOGLOBIN GLYCOSYLATED A1C: CPT

## 2022-06-01 PROCEDURE — 84443 ASSAY THYROID STIM HORMONE: CPT

## 2022-06-01 PROCEDURE — 80061 LIPID PANEL: CPT

## 2022-06-01 PROCEDURE — 80053 COMPREHEN METABOLIC PANEL: CPT

## 2022-06-01 PROCEDURE — 36415 COLL VENOUS BLD VENIPUNCTURE: CPT

## 2022-06-01 NOTE — TELEPHONE ENCOUNTER
Phone Number Called: [phone]    Call outcome: Left detailed message for patient. Informed to call back with any additional questions.    Message: Please call us to change appt. You 4:30 appt in not a open appt time- We do have appts. avail ible 3:40 and 11:40 on the same day.

## 2022-06-03 ENCOUNTER — OFFICE VISIT (OUTPATIENT)
Dept: MEDICAL GROUP | Facility: PHYSICIAN GROUP | Age: 52
End: 2022-06-03
Payer: COMMERCIAL

## 2022-06-03 ENCOUNTER — HOSPITAL ENCOUNTER (OUTPATIENT)
Facility: MEDICAL CENTER | Age: 52
End: 2022-06-03
Payer: COMMERCIAL

## 2022-06-03 VITALS
HEART RATE: 80 BPM | RESPIRATION RATE: 18 BRPM | SYSTOLIC BLOOD PRESSURE: 126 MMHG | BODY MASS INDEX: 38.93 KG/M2 | DIASTOLIC BLOOD PRESSURE: 74 MMHG | TEMPERATURE: 98.2 F | WEIGHT: 228 LBS | HEIGHT: 64 IN | OXYGEN SATURATION: 96 %

## 2022-06-03 DIAGNOSIS — F41.9 ANXIETY: ICD-10-CM

## 2022-06-03 DIAGNOSIS — E11.65 TYPE 2 DIABETES MELLITUS WITH HYPERGLYCEMIA, WITHOUT LONG-TERM CURRENT USE OF INSULIN (HCC): ICD-10-CM

## 2022-06-03 DIAGNOSIS — Z12.31 ENCOUNTER FOR SCREENING MAMMOGRAM FOR MALIGNANT NEOPLASM OF BREAST: ICD-10-CM

## 2022-06-03 PROCEDURE — 99214 OFFICE O/P EST MOD 30 MIN: CPT

## 2022-06-03 PROCEDURE — 82043 UR ALBUMIN QUANTITATIVE: CPT

## 2022-06-03 PROCEDURE — 82570 ASSAY OF URINE CREATININE: CPT

## 2022-06-03 RX ORDER — ESCITALOPRAM OXALATE 10 MG/1
10 TABLET ORAL DAILY
Qty: 30 TABLET | Refills: 1 | Status: SHIPPED | OUTPATIENT
Start: 2022-06-03 | End: 2022-06-20

## 2022-06-03 ASSESSMENT — FIBROSIS 4 INDEX: FIB4 SCORE: 1.18

## 2022-06-03 NOTE — PROGRESS NOTES
"CC:   Chief Complaint   Patient presents with   • Lab Results   • Follow-Up        HISTORY OF PRESENT ILLNESS: Patient is a 51 y.o. female established patient who presents today to discuss the following problems below:     Type 2 diabetes mellitus with hyperglycemia, without long-term current use of insulin ([facility])  Patient presents to follow-up on recent labs.  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  She is also moving which is understandably exhausting.  She does admit that her diet has not been as well-controlled as it usually is.  Hemoglobin A1c is below   Latest Reference Range & Units 12/08/21 07:59 06/01/22 08:05   Glycohemoglobin 4.0 - 5.6 % 7.1 (H) [1] 7.5 (H) [2]   (H): Data is abnormally high      Anxiety  Patient's primary concern at this visit is discussion of her somewhat obsessive-compulsive tendencies and underlying anxiety.  She reports that she is just now somewhat realizing that she is an anxious person and can get quite fixated on things.  She is tearful in describing the symptoms and notes that she gets overwhelmed fairly easily and feels that she has somewhat of a perfectionist.  She reports that her daughter also has some problems with anxiety and depression, as well as her mom (she suspects).    Past Medical History:   Diagnosis Date   • Allergy    • Anemia    • Arthritis    • Hyperlipidemia    • Hypertriglyceridemia 3/17/2010   • Obesity (BMI 35.0-39.9 without comorbidity) ([facility]) 3/27/2019       Allergies:Bactrim [sulfamethoxazole w-trimethoprim]    Review of Systems: Otherwise negative except for as stated above.      Exam: /74 (BP Location: Left arm, Patient Position: Sitting, BP Cuff Size: Large adult)   Pulse 80   Temp 36.8 °C (98.2 °F) (Temporal)   Resp 18   Ht 1.626 m (5' 4\")   Wt 103 kg (228 lb)   SpO2 96%  Body mass index is 39.14 kg/m².    Gen: Alert and oriented x4. Well developed, well-nourished female " in no apparent distress.  Skin: Warm, dry, good turgor, no rashes in visible areas or lacerations appreciated.   Eye: EOM intact, pupils equal, round and reactive, conjunctiva clear, lids normal.  Neck: Trachea midline, no masses, no thyromegaly  Lungs: Normal effort, CTA bilaterally, no wheezes, rhonchi, or rales. No stridor or audible wheezing. Equal chest expansion.   CV: Regular rate and rhythm. No murmurs, rubs, or gallops.  GI:  Soft, non-tender abdomen with no distention.   MSK: Normal gait, moves all extremities.  Neuro: Alert and oriented x 4, non-focal exam with motor and sensory grossly intact.  Ext: No clubbing, cyanosis, edema.  Psych: Normal behavior, affect and mood.      Assessment/Plan:  51 y.o. female with the following -    1. Type 2 diabetes mellitus with hyperglycemia, without long-term current use of insulin (HCC)  Chronic condition, stable but not at goal.  Patient is willing to increase her Jardiance to 25 mg daily.  Additionally, she is going to try to commit to making some healthier eating choices over the summer.  Plan to recheck A1c in 3 months  - Microalbumin Creat Ratio Urine (Clinic Collect); Future  - Empagliflozin 25 MG Tab; Take 1 Tablet by mouth every day.  Dispense: 90 Tablet; Refill: 1    2. Anxiety  Chronic condition, not previously diagnosed.  Patient I had a long discussion regarding various treatment options including counseling and psychotherapy versus medication management.  Patient reports that she has been to counseling and psychiatrist before and does not find them to be overly helpful as she cannot identify a source of trauma that has caused her to be this way.  She is however willing to try Lexapro.  Plan to follow-up in 4 weeks for recheck on dose and overall management of symptoms.  - escitalopram (LEXAPRO) 10 MG Tab; Take 1 Tablet by mouth every day.  Dispense: 30 Tablet; Refill: 1    3. Encounter for screening mammogram for malignant neoplasm of breast  -  MA-SCREENING MAMMO BILAT W/TOMOSYNTHESIS W/CAD; Future      Follow-up: Return in about 4 weeks (around 7/1/2022) for follow up meds with Dr. Marquez or Nora .    Health Maintenance: Deferred pap until Fall 2023.       Please note that this dictation was created using voice recognition software. I have made every reasonable attempt to correct obvious errors, but I expect that there are errors of grammar and possibly content that I did not discover before finalizing the note.    Electronically signed by SHOBHA Anaya on Ninoska 3, 2022

## 2022-06-04 DIAGNOSIS — E11.65 TYPE 2 DIABETES MELLITUS WITH HYPERGLYCEMIA, WITHOUT LONG-TERM CURRENT USE OF INSULIN (HCC): ICD-10-CM

## 2022-06-04 LAB
CREAT UR-MCNC: 109.49 MG/DL
MICROALBUMIN UR-MCNC: <1.2 MG/DL
MICROALBUMIN/CREAT UR: NORMAL MG/G (ref 0–30)

## 2022-06-06 PROBLEM — F41.9 ANXIETY: Status: ACTIVE | Noted: 2022-06-06

## 2022-06-06 NOTE — ASSESSMENT & PLAN NOTE
Patient's primary concern at this visit is discussion of her somewhat obsessive-compulsive tendencies and underlying anxiety.  She reports that she is just now somewhat realizing that she is an anxious person and can get quite fixated on things.  She is tearful in describing the symptoms and notes that she gets overwhelmed fairly easily and feels that she has somewhat of a perfectionist.  She reports that her daughter also has some problems with anxiety and depression, as well as her mom (she suspects).

## 2022-06-06 NOTE — ASSESSMENT & PLAN NOTE
Patient presents to follow-up on recent labs.  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  She is also moving which is understandably exhausting.  She does admit that her diet has not been as well-controlled as it usually is.  Hemoglobin A1c is below   Latest Reference Range & Units 12/08/21 07:59 06/01/22 08:05   Glycohemoglobin 4.0 - 5.6 % 7.1 (H) [1] 7.5 (H) [2]   (H): Data is abnormally high

## 2022-06-15 ENCOUNTER — HOSPITAL ENCOUNTER (OUTPATIENT)
Dept: RADIOLOGY | Facility: MEDICAL CENTER | Age: 52
End: 2022-06-15
Payer: COMMERCIAL

## 2022-06-15 DIAGNOSIS — Z12.31 ENCOUNTER FOR SCREENING MAMMOGRAM FOR MALIGNANT NEOPLASM OF BREAST: ICD-10-CM

## 2022-06-15 PROCEDURE — 77063 BREAST TOMOSYNTHESIS BI: CPT

## 2022-06-20 DIAGNOSIS — F41.9 ANXIETY: ICD-10-CM

## 2022-06-20 RX ORDER — ESCITALOPRAM OXALATE 5 MG/1
5 TABLET ORAL DAILY
Qty: 30 TABLET | Refills: 0 | Status: SHIPPED | OUTPATIENT
Start: 2022-06-20 | End: 2022-07-11

## 2022-07-05 ENCOUNTER — APPOINTMENT (OUTPATIENT)
Dept: MEDICAL GROUP | Facility: PHYSICIAN GROUP | Age: 52
End: 2022-07-05
Payer: COMMERCIAL

## 2022-07-11 ENCOUNTER — OFFICE VISIT (OUTPATIENT)
Dept: MEDICAL GROUP | Facility: PHYSICIAN GROUP | Age: 52
End: 2022-07-11
Payer: COMMERCIAL

## 2022-07-11 VITALS
HEART RATE: 72 BPM | TEMPERATURE: 97.3 F | RESPIRATION RATE: 16 BRPM | OXYGEN SATURATION: 96 % | WEIGHT: 226 LBS | BODY MASS INDEX: 38.58 KG/M2 | DIASTOLIC BLOOD PRESSURE: 76 MMHG | HEIGHT: 64 IN | SYSTOLIC BLOOD PRESSURE: 122 MMHG

## 2022-07-11 DIAGNOSIS — N95.2 ATROPHIC VAGINITIS: ICD-10-CM

## 2022-07-11 DIAGNOSIS — F41.9 ANXIETY: ICD-10-CM

## 2022-07-11 PROBLEM — M54.50 ACUTE LEFT-SIDED LOW BACK PAIN WITHOUT SCIATICA: Status: RESOLVED | Noted: 2021-12-08 | Resolved: 2022-07-11

## 2022-07-11 PROBLEM — H92.02 LEFT EAR PAIN: Status: RESOLVED | Noted: 2019-03-27 | Resolved: 2022-07-11

## 2022-07-11 PROBLEM — N76.0 ACUTE VAGINITIS: Status: RESOLVED | Noted: 2022-03-25 | Resolved: 2022-07-11

## 2022-07-11 PROBLEM — L60.9 NAIL PROBLEM: Status: RESOLVED | Noted: 2021-07-07 | Resolved: 2022-07-11

## 2022-07-11 PROBLEM — E66.9 OBESITY (BMI 30-39.9): Status: RESOLVED | Noted: 2018-03-09 | Resolved: 2022-07-11

## 2022-07-11 PROBLEM — R63.5 WEIGHT GAIN: Status: RESOLVED | Noted: 2019-10-01 | Resolved: 2022-07-11

## 2022-07-11 PROBLEM — S06.0X0A CONCUSSION WITHOUT LOSS OF CONSCIOUSNESS: Status: RESOLVED | Noted: 2020-02-06 | Resolved: 2022-07-11

## 2022-07-11 PROBLEM — E66.01 CLASS 2 SEVERE OBESITY DUE TO EXCESS CALORIES WITH SERIOUS COMORBIDITY IN ADULT (HCC): Status: ACTIVE | Noted: 2019-03-27

## 2022-07-11 PROBLEM — E66.812 CLASS 2 SEVERE OBESITY DUE TO EXCESS CALORIES WITH SERIOUS COMORBIDITY IN ADULT (HCC): Status: ACTIVE | Noted: 2019-03-27

## 2022-07-11 PROBLEM — N92.4 ABNORMAL PERIMENOPAUSAL BLEEDING: Status: RESOLVED | Noted: 2020-02-06 | Resolved: 2022-07-11

## 2022-07-11 PROBLEM — R10.11 RIGHT UPPER QUADRANT PAIN: Status: RESOLVED | Noted: 2022-03-25 | Resolved: 2022-07-11

## 2022-07-11 PROCEDURE — 99214 OFFICE O/P EST MOD 30 MIN: CPT | Performed by: FAMILY MEDICINE

## 2022-07-11 RX ORDER — ESTRADIOL 0.1 MG/G
0.5 CREAM VAGINAL DAILY
Qty: 42.5 G | Refills: 5 | Status: SHIPPED | OUTPATIENT
Start: 2022-07-11

## 2022-07-11 RX ORDER — DULOXETIN HYDROCHLORIDE 30 MG/1
30 CAPSULE, DELAYED RELEASE ORAL DAILY
Qty: 30 CAPSULE | Refills: 3 | Status: SHIPPED | OUTPATIENT
Start: 2022-07-11 | End: 2022-11-16

## 2022-07-11 ASSESSMENT — FIBROSIS 4 INDEX: FIB4 SCORE: 1.18

## 2022-07-11 NOTE — ASSESSMENT & PLAN NOTE
This is a chronic problem.  Over the last year patient has been having troubles with fragility to the vaginal area.  She has also been having itching.  If she wipes with toilet paper she can get tears in the vaginal area very easily.  She has been seen and assessed for different infections and all of which was negative.  She has had troubles with irregular menses most of her life due to polycystic ovary disease.  The last 5 years or so she has been menstruating along with her teenage daughter.  They are heavy for a day or 2 and then can have light flow for couple more days.  Does get an occasional hot flash.

## 2022-07-11 NOTE — ASSESSMENT & PLAN NOTE
This is a chronic problem.  Patient has troubles with anxiety and can come home from her work in 2 years as she feels so exhausted.  She was tried on Lexapro but had adverse effects and she did not like it.  On review of the chart she is been on Wellbutrin in the past that also increased her anxiety.  She would like to try something.  She may have some component of ADD as well.  She has tried some stimulants in the past but did not like those.  She feels her problem is more anxiety rather than ADD symptoms.

## 2022-08-27 DIAGNOSIS — E11.65 TYPE 2 DIABETES MELLITUS WITH HYPERGLYCEMIA, WITHOUT LONG-TERM CURRENT USE OF INSULIN (HCC): ICD-10-CM

## 2022-08-29 RX ORDER — METFORMIN HYDROCHLORIDE 500 MG/1
TABLET, EXTENDED RELEASE ORAL
Qty: 360 TABLET | Refills: 0 | Status: SHIPPED | OUTPATIENT
Start: 2022-08-29 | End: 2022-12-05

## 2022-09-17 DIAGNOSIS — E78.5 HYPERLIPIDEMIA ASSOCIATED WITH TYPE 2 DIABETES MELLITUS (HCC): ICD-10-CM

## 2022-09-17 DIAGNOSIS — E11.69 HYPERLIPIDEMIA ASSOCIATED WITH TYPE 2 DIABETES MELLITUS (HCC): ICD-10-CM

## 2022-09-22 RX ORDER — ATORVASTATIN CALCIUM 20 MG/1
20 TABLET, FILM COATED ORAL
Qty: 90 TABLET | Refills: 0 | Status: SHIPPED | OUTPATIENT
Start: 2022-09-22 | End: 2022-12-15

## 2022-11-01 ENCOUNTER — OFFICE VISIT (OUTPATIENT)
Dept: URGENT CARE | Facility: PHYSICIAN GROUP | Age: 52
End: 2022-11-01
Payer: COMMERCIAL

## 2022-11-01 VITALS
WEIGHT: 219 LBS | SYSTOLIC BLOOD PRESSURE: 126 MMHG | DIASTOLIC BLOOD PRESSURE: 82 MMHG | HEIGHT: 64 IN | BODY MASS INDEX: 37.39 KG/M2 | HEART RATE: 80 BPM | OXYGEN SATURATION: 99 % | TEMPERATURE: 97.1 F | RESPIRATION RATE: 20 BRPM

## 2022-11-01 DIAGNOSIS — R68.89 FLU-LIKE SYMPTOMS: ICD-10-CM

## 2022-11-01 DIAGNOSIS — R05.1 ACUTE COUGH: ICD-10-CM

## 2022-11-01 DIAGNOSIS — B34.9 VIRAL ILLNESS: ICD-10-CM

## 2022-11-01 LAB
EXTERNAL QUALITY CONTROL: NORMAL
FLUAV+FLUBV AG SPEC QL IA: NEGATIVE
INT CON NEG: NORMAL
INT CON NEG: NORMAL
INT CON POS: NORMAL
INT CON POS: NORMAL
SARS-COV+SARS-COV-2 AG RESP QL IA.RAPID: NEGATIVE

## 2022-11-01 PROCEDURE — 87804 INFLUENZA ASSAY W/OPTIC: CPT | Performed by: PHYSICIAN ASSISTANT

## 2022-11-01 PROCEDURE — 99213 OFFICE O/P EST LOW 20 MIN: CPT | Performed by: PHYSICIAN ASSISTANT

## 2022-11-01 PROCEDURE — 87426 SARSCOV CORONAVIRUS AG IA: CPT | Performed by: PHYSICIAN ASSISTANT

## 2022-11-01 RX ORDER — DEXTROMETHORPHAN HYDROBROMIDE AND PROMETHAZINE HYDROCHLORIDE 15; 6.25 MG/5ML; MG/5ML
5 SYRUP ORAL EVERY 6 HOURS PRN
Qty: 100 ML | Refills: 0 | Status: SHIPPED | OUTPATIENT
Start: 2022-11-01 | End: 2022-11-06

## 2022-11-01 ASSESSMENT — ENCOUNTER SYMPTOMS
MYALGIAS: 1
COUGH: 1

## 2022-11-01 ASSESSMENT — FIBROSIS 4 INDEX: FIB4 SCORE: 1.2

## 2022-11-01 NOTE — PROGRESS NOTES
Subjective:   Radha Marlow is a 52 y.o. female who presents today with   Chief Complaint   Patient presents with    Cough     Fatigue,x4 days     Cough  This is a new problem. Episode onset: 4 days. The problem has been unchanged. The problem occurs constantly. The cough is Non-productive. Associated symptoms include myalgias. Pertinent negatives include no chest pain. Associated symptoms comments: Fatigue. Treatments tried: Robitussin, tylenol. The treatment provided mild relief.   Symptoms started on Saturday and patient tested on Sunday for COVID at home it was negative.  Cough is keeping patient up at night despite over-the-counter treatments.    PMH:  has a past medical history of Allergy, Anemia, Arthritis, Hyperlipidemia, Hypertriglyceridemia (3/17/2010), and Obesity (BMI 35.0-39.9 without comorbidity) ([facility]) (3/27/2019).    She has no past medical history of Breast cancer ([facility]).  MEDS:   Current Outpatient Medications:     promethazine-dextromethorphan (PROMETHAZINE-DM) 6.25-15 MG/5ML syrup, Take 5 mL by mouth every 6 hours as needed for Cough for up to 5 days., Disp: 100 mL, Rfl: 0    atorvastatin (LIPITOR) 20 MG Tab, Take 1 Tablet by mouth every day., Disp: 90 Tablet, Rfl: 0    metFORMIN ER (GLUCOPHAGE XR) 500 MG TABLET SR 24 HR, TAKE 2 TABLETS BY MOUTH TWICE A DAY, Disp: 360 Tablet, Rfl: 0    DULoxetine (CYMBALTA) 30 MG Cap DR Particles, Take 1 Capsule by mouth every day., Disp: 30 Capsule, Rfl: 3    estradiol (ESTRACE) 0.1 MG/GM vaginal cream, Insert 0.5 g into the vagina every day. Use daily for 2 weeks and then twice a week., Disp: 42.5 g, Rfl: 5    Empagliflozin 25 MG Tab, Take 1 Tablet by mouth every day., Disp: 90 Tablet, Rfl: 1    ONETOUCH ULTRA strip, USE ONE INSURANCE PREFERRED STRIP TO TEST BLOOD SUGAR TWICE DAILY., Disp: 100 Strip, Rfl: 3    Blood Glucose Meter Kit, Test blood sugar as recommended by provider. Insurance preferred blood glucose monitoring kit., Disp: 1 Kit, Rfl: 0     "Blood Glucose Test Strips, Use one insurance preferred strip to test blood sugar twice daily., Disp: 100 Strip, Rfl: 3    Lancets, Use one insurance preferred lancet to test blood sugar twice daily., Disp: 100 Each, Rfl: 3    Alcohol Swabs, Wipe site with prep pad prior to injection., Disp: 100 Each, Rfl: 3    Multiple Vitamin (DAILY VITAMINS PO), Take  by mouth., Disp: , Rfl:   ALLERGIES:   Allergies   Allergen Reactions    Bactrim [Sulfamethoxazole W-Trimethoprim] Unspecified     Rash      SURGHX: History reviewed. No pertinent surgical history.  SOCHX:  reports that she quit smoking about 31 years ago. Her smoking use included cigarettes. She has a 0.25 pack-year smoking history. She has never used smokeless tobacco. She reports that she does not drink alcohol and does not use drugs.  FH: Reviewed with patient, not pertinent to this visit.     Review of Systems   Constitutional:  Positive for malaise/fatigue.   Respiratory:  Positive for cough.    Cardiovascular:  Negative for chest pain.   Musculoskeletal:  Positive for myalgias.      Objective:   /82 (BP Location: Left arm, Patient Position: Sitting, BP Cuff Size: Adult)   Pulse 80   Temp 36.2 °C (97.1 °F) (Temporal)   Resp 20   Ht 1.626 m (5' 4\")   Wt 99.3 kg (219 lb)   SpO2 99%   BMI 37.59 kg/m²   Physical Exam  Vitals and nursing note reviewed.   Constitutional:       General: She is not in acute distress.     Appearance: Normal appearance. She is well-developed. She is not ill-appearing or toxic-appearing.   HENT:      Head: Normocephalic and atraumatic.      Right Ear: Hearing normal.      Left Ear: Hearing normal.      Nose: Rhinorrhea present.   Cardiovascular:      Rate and Rhythm: Normal rate and regular rhythm.      Heart sounds: Normal heart sounds.   Pulmonary:      Effort: Pulmonary effort is normal.      Breath sounds: Normal breath sounds. No stridor. No wheezing, rhonchi or rales.   Musculoskeletal:      Comments: Normal movement in " all 4 extremities   Skin:     General: Skin is warm and dry.   Neurological:      Mental Status: She is alert.      Coordination: Coordination normal.   Psychiatric:         Mood and Affect: Mood normal.     FLU -  COVID -    Assessment/Plan:   Assessment    1. Flu-like symptoms  - POCT Influenza A/B  - POCT SARS-COV Antigen TALON (Symptomatic only)    2. Acute cough  - promethazine-dextromethorphan (PROMETHAZINE-DM) 6.25-15 MG/5ML syrup; Take 5 mL by mouth every 6 hours as needed for Cough for up to 5 days.  Dispense: 100 mL; Refill: 0  Symptoms and presentation consistent with viral illness and we will rule out COVID at this time.  Vital signs are stable on exam today.  Discussed CDC guidelines including self isolation at home.   Patient encouraged to get plenty of rest, use OTC tylenol for pain/fever, and drink plenty of fluids.  No indication for antibiotics at this time.  Likely viral illness.  Will prescribe cough syrup for patient at this time to only use sparingly as prescribed and mainly at night and not before driving or working.  She is understanding possible drowsiness side effects of cough syrup.    Differential diagnosis, natural history, supportive care, and indications for immediate follow-up discussed.   Patient given instructions and understanding of medications and treatment.    If not improving in 3-5 days, F/U with PCP or return to  if symptoms worsen.    Patient agreeable to plan.      Please note that this dictation was created using voice recognition software. I have made every reasonable attempt to correct obvious errors, but I expect that there are errors of grammar and possibly content that I did not discover before finalizing the note.    Mayco Ibarra PA-C

## 2022-11-16 RX ORDER — DULOXETIN HYDROCHLORIDE 30 MG/1
30 CAPSULE, DELAYED RELEASE ORAL DAILY
Qty: 90 CAPSULE | Refills: 1 | Status: SHIPPED | OUTPATIENT
Start: 2022-11-16 | End: 2023-04-24 | Stop reason: SDUPTHER

## 2022-11-16 NOTE — TELEPHONE ENCOUNTER
Received request via: Pharmacy    Was the patient seen in the last year in this department? Yes    Does the patient have an active prescription (recently filled or refills available) for medication(s) requested? No    Does the patient have skilled nursing Plus and need 100 day supply (blood pressure, diabetes and cholesterol meds only)? Patient does not have SCP

## 2022-12-01 DIAGNOSIS — E11.65 TYPE 2 DIABETES MELLITUS WITH HYPERGLYCEMIA, WITHOUT LONG-TERM CURRENT USE OF INSULIN (HCC): ICD-10-CM

## 2022-12-01 RX ORDER — EMPAGLIFLOZIN 25 MG/1
TABLET, FILM COATED ORAL
Qty: 90 TABLET | Refills: 1 | Status: SHIPPED | OUTPATIENT
Start: 2022-12-01 | End: 2023-06-11

## 2022-12-02 DIAGNOSIS — E11.65 TYPE 2 DIABETES MELLITUS WITH HYPERGLYCEMIA, WITHOUT LONG-TERM CURRENT USE OF INSULIN (HCC): ICD-10-CM

## 2022-12-02 NOTE — TELEPHONE ENCOUNTER
Received request via: Pharmacy    Was the patient seen in the last year in this department? Yes    Does the patient have an active prescription (recently filled or refills available) for medication(s) requested? No    Does the patient have assisted Plus and need 100 day supply (blood pressure, diabetes and cholesterol meds only)? Patient does not have SCP

## 2022-12-05 RX ORDER — METFORMIN HYDROCHLORIDE 500 MG/1
TABLET, EXTENDED RELEASE ORAL
Qty: 360 TABLET | Refills: 0 | Status: SHIPPED | OUTPATIENT
Start: 2022-12-05 | End: 2023-02-28

## 2022-12-14 DIAGNOSIS — E78.5 HYPERLIPIDEMIA ASSOCIATED WITH TYPE 2 DIABETES MELLITUS (HCC): ICD-10-CM

## 2022-12-14 DIAGNOSIS — E11.69 HYPERLIPIDEMIA ASSOCIATED WITH TYPE 2 DIABETES MELLITUS (HCC): ICD-10-CM

## 2022-12-14 NOTE — TELEPHONE ENCOUNTER
Received request via: Pharmacy    Was the patient seen in the last year in this department? Yes    Does the patient have an active prescription (recently filled or refills available) for medication(s) requested? No    Does the patient have USP Plus and need 100 day supply (blood pressure, diabetes and cholesterol meds only)? Patient does not have SCP

## 2022-12-15 RX ORDER — ATORVASTATIN CALCIUM 20 MG/1
20 TABLET, FILM COATED ORAL
Qty: 90 TABLET | Refills: 0 | Status: SHIPPED | OUTPATIENT
Start: 2022-12-15 | End: 2023-03-14

## 2023-02-27 DIAGNOSIS — E11.65 TYPE 2 DIABETES MELLITUS WITH HYPERGLYCEMIA, WITHOUT LONG-TERM CURRENT USE OF INSULIN (HCC): ICD-10-CM

## 2023-02-28 RX ORDER — METFORMIN HYDROCHLORIDE 500 MG/1
TABLET, EXTENDED RELEASE ORAL
Qty: 360 TABLET | Refills: 0 | Status: SHIPPED | OUTPATIENT
Start: 2023-02-28 | End: 2023-05-31

## 2023-03-14 DIAGNOSIS — E78.5 HYPERLIPIDEMIA ASSOCIATED WITH TYPE 2 DIABETES MELLITUS (HCC): ICD-10-CM

## 2023-03-14 DIAGNOSIS — E11.69 HYPERLIPIDEMIA ASSOCIATED WITH TYPE 2 DIABETES MELLITUS (HCC): ICD-10-CM

## 2023-03-14 RX ORDER — ATORVASTATIN CALCIUM 20 MG/1
20 TABLET, FILM COATED ORAL
Qty: 90 TABLET | Refills: 0 | Status: SHIPPED | OUTPATIENT
Start: 2023-03-14 | End: 2023-06-18

## 2023-04-22 ENCOUNTER — PATIENT MESSAGE (OUTPATIENT)
Dept: MEDICAL GROUP | Facility: PHYSICIAN GROUP | Age: 53
End: 2023-04-22
Payer: COMMERCIAL

## 2023-04-26 RX ORDER — DULOXETIN HYDROCHLORIDE 30 MG/1
30 CAPSULE, DELAYED RELEASE ORAL DAILY
Qty: 90 CAPSULE | Refills: 1 | Status: SHIPPED | OUTPATIENT
Start: 2023-04-26 | End: 2023-05-29 | Stop reason: SDUPTHER

## 2023-05-03 ENCOUNTER — OFFICE VISIT (OUTPATIENT)
Dept: MEDICAL GROUP | Facility: PHYSICIAN GROUP | Age: 53
End: 2023-05-03
Payer: COMMERCIAL

## 2023-05-03 ENCOUNTER — HOSPITAL ENCOUNTER (OUTPATIENT)
Dept: LAB | Facility: MEDICAL CENTER | Age: 53
End: 2023-05-03
Payer: COMMERCIAL

## 2023-05-03 VITALS
BODY MASS INDEX: 37.39 KG/M2 | TEMPERATURE: 96.9 F | HEART RATE: 71 BPM | HEIGHT: 64 IN | WEIGHT: 219 LBS | DIASTOLIC BLOOD PRESSURE: 80 MMHG | RESPIRATION RATE: 16 BRPM | SYSTOLIC BLOOD PRESSURE: 128 MMHG | OXYGEN SATURATION: 96 %

## 2023-05-03 DIAGNOSIS — E11.65 TYPE 2 DIABETES MELLITUS WITH HYPERGLYCEMIA, WITHOUT LONG-TERM CURRENT USE OF INSULIN (HCC): ICD-10-CM

## 2023-05-03 DIAGNOSIS — L90.0 LICHEN SCLEROSUS: ICD-10-CM

## 2023-05-03 DIAGNOSIS — Z00.00 WELLNESS EXAMINATION: ICD-10-CM

## 2023-05-03 LAB
CREAT UR-MCNC: 99.67 MG/DL
ERYTHROCYTE [DISTWIDTH] IN BLOOD BY AUTOMATED COUNT: 41.4 FL (ref 35.9–50)
HBA1C MFR BLD: 6.7 % (ref ?–5.8)
HCT VFR BLD AUTO: 46.3 % (ref 37–47)
HGB BLD-MCNC: 15.8 G/DL (ref 12–16)
MCH RBC QN AUTO: 30.9 PG (ref 27–33)
MCHC RBC AUTO-ENTMCNC: 34.1 G/DL (ref 33.6–35)
MCV RBC AUTO: 90.6 FL (ref 81.4–97.8)
MICROALBUMIN UR-MCNC: 1.4 MG/DL
MICROALBUMIN/CREAT UR: 14 MG/G (ref 0–30)
PLATELET # BLD AUTO: 248 K/UL (ref 164–446)
PMV BLD AUTO: 9.8 FL (ref 9–12.9)
POCT INT CON NEG: NEGATIVE
POCT INT CON POS: POSITIVE
RBC # BLD AUTO: 5.11 M/UL (ref 4.2–5.4)
TSH SERPL DL<=0.005 MIU/L-ACNC: 0.52 UIU/ML (ref 0.38–5.33)
WBC # BLD AUTO: 6.7 K/UL (ref 4.8–10.8)

## 2023-05-03 PROCEDURE — 82570 ASSAY OF URINE CREATININE: CPT

## 2023-05-03 PROCEDURE — 82043 UR ALBUMIN QUANTITATIVE: CPT

## 2023-05-03 PROCEDURE — 83036 HEMOGLOBIN GLYCOSYLATED A1C: CPT

## 2023-05-03 PROCEDURE — 99396 PREV VISIT EST AGE 40-64: CPT

## 2023-05-03 PROCEDURE — 36415 COLL VENOUS BLD VENIPUNCTURE: CPT

## 2023-05-03 PROCEDURE — 80053 COMPREHEN METABOLIC PANEL: CPT

## 2023-05-03 PROCEDURE — 84443 ASSAY THYROID STIM HORMONE: CPT

## 2023-05-03 PROCEDURE — 85027 COMPLETE CBC AUTOMATED: CPT

## 2023-05-03 PROCEDURE — 80061 LIPID PANEL: CPT

## 2023-05-03 RX ORDER — CLOBETASOL PROPIONATE 0.5 MG/G
OINTMENT TOPICAL
COMMUNITY
Start: 2023-03-05

## 2023-05-03 ASSESSMENT — FIBROSIS 4 INDEX: FIB4 SCORE: 1.2

## 2023-05-03 ASSESSMENT — PATIENT HEALTH QUESTIONNAIRE - PHQ9: CLINICAL INTERPRETATION OF PHQ2 SCORE: 0

## 2023-05-03 NOTE — ASSESSMENT & PLAN NOTE
Patient reports that she had persistent itching and pain for about 3 months and then went to Dr. Starr and was diagnosed with lichen sclerosus. She was put on estrogen vaginal cream and clobetasol 0.05% ointment.

## 2023-05-03 NOTE — LETTER
[facility]  SAMY Womack  [address]  [place] [address]  Fax: [phone]   Authorization for Release/Disclosure of   Protected Health Information   Name: AUBREY JARAMILLO : 1970 SSN: [id number]   Address: [address] [place] [address]  [place] [address] Phone:    [phone] (home) [phone] (work)   I authorize the entity listed below to release/disclose the PHI below to:   [facility]/SAMY Womack and SAMY Womack   Provider or Entity Name:  Dr. Huma Hernandez    Address   City, [facility]   Phone:      Fax:     Reason for request: continuity of care   Information to be released:    [  ] LAST COLONOSCOPY,  including any PATH REPORT and follow-up  [  ] LAST FIT/COLOGUARD RESULT [  ] LAST DEXA  [  ] LAST MAMMOGRAM  [  ] LAST PAP  [  ] LAST LABS [  ] RETINA EXAM REPORT  [  ] IMMUNIZATION RECORDS  [  ] Release all info      [  ] Check here and initial the line next to each item to release ALL health information INCLUDING  _____ Care and treatment for drug and / or alcohol abuse  _____ HIV testing, infection status, or AIDS  _____ Genetic Testing    DATES OF SERVICE OR TIME PERIOD TO BE DISCLOSED: _____________  I understand and acknowledge that:  * This Authorization may be revoked at any time by you in writing, except if your health information has already been used or disclosed.  * Your health information that will be used or disclosed as a result of you signing this authorization could be re-disclosed by the recipient. If this occurs, your re-disclosed health information may no longer be protected by State or Federal laws.  * You may refuse to sign this Authorization. Your refusal will not affect your ability to obtain treatment.  * This Authorization becomes effective upon signing and will  on (date) __________.      If no date is indicated, this Authorization will  one (1) year from the signature date.    Name: Aubrey Smyth  Marlow  Signature: Date:   5/3/2023     PLEASE FAX REQUESTED RECORDS BACK TO: [phone]

## 2023-05-04 LAB
ALBUMIN SERPL BCP-MCNC: 4.5 G/DL (ref 3.2–4.9)
ALBUMIN/GLOB SERPL: 1.3 G/DL
ALP SERPL-CCNC: 62 U/L (ref 30–99)
ALT SERPL-CCNC: 41 U/L (ref 2–50)
ANION GAP SERPL CALC-SCNC: 11 MMOL/L (ref 7–16)
AST SERPL-CCNC: 32 U/L (ref 12–45)
BILIRUB SERPL-MCNC: 0.3 MG/DL (ref 0.1–1.5)
BUN SERPL-MCNC: 15 MG/DL (ref 8–22)
CALCIUM ALBUM COR SERPL-MCNC: 9.1 MG/DL (ref 8.5–10.5)
CALCIUM SERPL-MCNC: 9.5 MG/DL (ref 8.5–10.5)
CHLORIDE SERPL-SCNC: 101 MMOL/L (ref 96–112)
CHOLEST SERPL-MCNC: 141 MG/DL (ref 100–199)
CO2 SERPL-SCNC: 22 MMOL/L (ref 20–33)
CREAT SERPL-MCNC: 0.66 MG/DL (ref 0.5–1.4)
FASTING STATUS PATIENT QL REPORTED: NORMAL
GFR SERPLBLD CREATININE-BSD FMLA CKD-EPI: 106 ML/MIN/1.73 M 2
GLOBULIN SER CALC-MCNC: 3.5 G/DL (ref 1.9–3.5)
GLUCOSE SERPL-MCNC: 137 MG/DL (ref 65–99)
HDLC SERPL-MCNC: 52 MG/DL
LDLC SERPL CALC-MCNC: 66 MG/DL
POTASSIUM SERPL-SCNC: 4.6 MMOL/L (ref 3.6–5.5)
PROT SERPL-MCNC: 8 G/DL (ref 6–8.2)
SODIUM SERPL-SCNC: 134 MMOL/L (ref 135–145)
TRIGL SERPL-MCNC: 116 MG/DL (ref 0–149)

## 2023-05-27 DIAGNOSIS — E11.65 TYPE 2 DIABETES MELLITUS WITH HYPERGLYCEMIA, WITHOUT LONG-TERM CURRENT USE OF INSULIN (HCC): ICD-10-CM

## 2023-05-30 RX ORDER — DULOXETIN HYDROCHLORIDE 30 MG/1
30 CAPSULE, DELAYED RELEASE ORAL DAILY
Qty: 90 CAPSULE | Refills: 1 | Status: SHIPPED | OUTPATIENT
Start: 2023-05-30 | End: 2023-06-18

## 2023-05-31 RX ORDER — METFORMIN HYDROCHLORIDE 500 MG/1
TABLET, EXTENDED RELEASE ORAL
Qty: 360 TABLET | Refills: 0 | Status: SHIPPED | OUTPATIENT
Start: 2023-05-31 | End: 2023-11-28

## 2023-06-09 DIAGNOSIS — E11.65 TYPE 2 DIABETES MELLITUS WITH HYPERGLYCEMIA, WITHOUT LONG-TERM CURRENT USE OF INSULIN (HCC): ICD-10-CM

## 2023-06-10 DIAGNOSIS — E11.69 HYPERLIPIDEMIA ASSOCIATED WITH TYPE 2 DIABETES MELLITUS (HCC): ICD-10-CM

## 2023-06-10 DIAGNOSIS — E78.5 HYPERLIPIDEMIA ASSOCIATED WITH TYPE 2 DIABETES MELLITUS (HCC): ICD-10-CM

## 2023-06-11 RX ORDER — EMPAGLIFLOZIN 25 MG/1
TABLET, FILM COATED ORAL
Qty: 90 TABLET | Refills: 1 | Status: SHIPPED | OUTPATIENT
Start: 2023-06-11 | End: 2023-11-17

## 2023-06-18 RX ORDER — DULOXETIN HYDROCHLORIDE 30 MG/1
30 CAPSULE, DELAYED RELEASE ORAL DAILY
Qty: 90 CAPSULE | Refills: 1 | Status: SHIPPED | OUTPATIENT
Start: 2023-06-18 | End: 2024-02-29

## 2023-06-18 RX ORDER — ATORVASTATIN CALCIUM 20 MG/1
20 TABLET, FILM COATED ORAL
Qty: 90 TABLET | Refills: 0 | Status: SHIPPED | OUTPATIENT
Start: 2023-06-18 | End: 2023-09-28

## 2023-08-02 ENCOUNTER — OFFICE VISIT (OUTPATIENT)
Dept: MEDICAL GROUP | Facility: PHYSICIAN GROUP | Age: 53
End: 2023-08-02
Payer: COMMERCIAL

## 2023-08-02 VITALS
SYSTOLIC BLOOD PRESSURE: 118 MMHG | RESPIRATION RATE: 19 BRPM | TEMPERATURE: 97.4 F | DIASTOLIC BLOOD PRESSURE: 84 MMHG | BODY MASS INDEX: 37.59 KG/M2 | WEIGHT: 220.2 LBS | HEART RATE: 92 BPM | HEIGHT: 64 IN | OXYGEN SATURATION: 94 %

## 2023-08-02 DIAGNOSIS — K64.9 HEMORRHOIDS, UNSPECIFIED HEMORRHOID TYPE: ICD-10-CM

## 2023-08-02 DIAGNOSIS — E11.65 TYPE 2 DIABETES MELLITUS WITH HYPERGLYCEMIA, WITHOUT LONG-TERM CURRENT USE OF INSULIN (HCC): ICD-10-CM

## 2023-08-02 LAB
HBA1C MFR BLD: 7.3 % (ref ?–5.8)
POCT INT CON NEG: NEGATIVE
POCT INT CON POS: POSITIVE

## 2023-08-02 PROCEDURE — 83036 HEMOGLOBIN GLYCOSYLATED A1C: CPT

## 2023-08-02 PROCEDURE — 3074F SYST BP LT 130 MM HG: CPT

## 2023-08-02 PROCEDURE — 99214 OFFICE O/P EST MOD 30 MIN: CPT

## 2023-08-02 PROCEDURE — 3079F DIAST BP 80-89 MM HG: CPT

## 2023-08-02 RX ORDER — HYDROCORTISONE ACETATE 25 MG/1
25 SUPPOSITORY RECTAL EVERY 12 HOURS
Qty: 14 SUPPOSITORY | Refills: 0 | Status: SHIPPED | OUTPATIENT
Start: 2023-08-02 | End: 2023-08-09

## 2023-08-02 RX ORDER — TIRZEPATIDE 2.5 MG/.5ML
2.5 INJECTION, SOLUTION SUBCUTANEOUS
Qty: 2 ML | Refills: 1 | Status: SHIPPED | OUTPATIENT
Start: 2023-08-02 | End: 2023-09-06

## 2023-08-02 ASSESSMENT — FIBROSIS 4 INDEX: FIB4 SCORE: 1.48

## 2023-08-02 NOTE — PROGRESS NOTES
"CC:   Chief Complaint   Patient presents with    Medication Refill     Wants to discuss Aysha         HISTORY OF PRESENT ILLNESS: Patient is a 52 y.o. female established patient who presents today to discuss the following problems below:     Type 2 diabetes mellitus with hyperglycemia, without long-term current use of insulin (HCC)  Patient presents for follow-up in regards to her type 2 diabetes.  She is currently on Jardiance 25 mg daily with metformin extended release 500 mg, 2 tablets twice daily. She did have her hysterectomy about 7 weeks ago. She does get occasional constipation. No narcotics or ibuprofen. No changes to her diet. She has used ducolax and miralax but will then get episodes of diarrhea. She does report that her rectum feels narrowed, but is not sure if she ever has pencil thin stools. She sometimes gets pain with bowel movements, sometimes has blood. Did have a colonoscopy a year ago that was normal.     Review of Systems: Otherwise negative except for as stated above.      Exam: /84   Pulse 92   Temp 36.3 °C (97.4 °F) (Temporal)   Resp 19   Ht 1.626 m (5' 4\")   Wt 99.9 kg (220 lb 3.2 oz)   SpO2 94%  Body mass index is 37.8 kg/m².    Physical Exam  Constitutional:       Appearance: Normal appearance.   Eyes:      Extraocular Movements: Extraocular movements intact.   Pulmonary:      Effort: Pulmonary effort is normal.   Musculoskeletal:      Cervical back: Normal range of motion.   Neurological:      General: No focal deficit present.      Mental Status: She is alert and oriented to person, place, and time.   Psychiatric:         Mood and Affect: Mood normal.         Behavior: Behavior normal.         Assessment/Plan:  52 y.o. female with the following -    1. Type 2 diabetes mellitus with hyperglycemia, without long-term current use of insulin (HCC)  DM II is uncontrolled at today's visit. Reviewed patient's dietary intake with focus on reduction or elimination of simple " carbohydrates, encouraged patient to increase fiber intake to at least 25 grams per day as tolerated. Aerobic and anaerobic exercise is recommended, aim for 10 minutes daily five days per week and increase to 30 minutes daily as tolerated. Patient's current medication will be adjusted with repeat labs in 3 months to re-evaluate glycohemoglobin. Patient advised to present to ER for severe fatigue, confusion, weakness, rapid heard rate.   Continue jardiance 25mg daily and metformin ER 500mg 2 tabs BID. Add Mounjaro once constipation is stable, patient agreeable to plan.   A1c 7.3%.   - POCT  A1C  - Tirzepatide (MOUNJARO) 2.5 MG/0.5ML Solution Pen-injector; Inject 2.5 mg under the skin every 7 days.  Dispense: 2 mL; Refill: 1    2. Hemorrhoids, unspecified hemorrhoid type  Acute problem, likely secondary to constipation postoperatively.  Treat with steroid suppository, MiraLAX 17 g daily, reduce to every other day or half dosing daily for diarrhea.  Once bowel movements are regular, can start on Mounjaro  - hydrocortisone (ANUSOL-HC) 25 MG Suppos; Insert 1 Suppository into the rectum every 12 hours for 7 days.  Dispense: 14 Suppository; Refill: 0         Follow-up: Return in about 6 weeks (around 9/13/2023) for DM 2.    Health Maintenance: Completed      Please note that this dictation was created using voice recognition software. I have made every reasonable attempt to correct obvious errors, but I expect that there are errors of grammar and possibly content that I did not discover before finalizing the note.    Electronically signed by SHOBHA Anaya on August 2, 2023

## 2023-08-02 NOTE — ASSESSMENT & PLAN NOTE
Patient presents for follow-up in regards to her type 2 diabetes.  She is currently on Jardiance 25 mg daily with metformin extended release 500 mg, 2 tablets twice daily. She did have her hysterectomy about 7 weeks ago. She does get occasional constipation. No narcotics or ibuprofen. No changes to her diet. She has used ducolax and miralax but will then get episodes of diarrhea. She does report that her rectum feels narrowed, but is not sure if she ever has pencil thin stools. She sometimes gets pain with bowel movements, sometimes has blood. Did have a colonoscopy a year ago that was normal.

## 2023-08-22 ENCOUNTER — TELEPHONE (OUTPATIENT)
Dept: MEDICAL GROUP | Facility: PHYSICIAN GROUP | Age: 53
End: 2023-08-22
Payer: COMMERCIAL

## 2023-08-28 ENCOUNTER — TELEPHONE (OUTPATIENT)
Dept: MEDICAL GROUP | Facility: MEDICAL CENTER | Age: 53
End: 2023-08-28
Payer: COMMERCIAL

## 2023-09-06 ENCOUNTER — OFFICE VISIT (OUTPATIENT)
Dept: MEDICAL GROUP | Facility: PHYSICIAN GROUP | Age: 53
End: 2023-09-06
Payer: COMMERCIAL

## 2023-09-06 VITALS
HEART RATE: 80 BPM | HEIGHT: 64 IN | OXYGEN SATURATION: 94 % | TEMPERATURE: 97 F | DIASTOLIC BLOOD PRESSURE: 80 MMHG | WEIGHT: 220.4 LBS | SYSTOLIC BLOOD PRESSURE: 120 MMHG | BODY MASS INDEX: 37.63 KG/M2 | RESPIRATION RATE: 16 BRPM

## 2023-09-06 DIAGNOSIS — E11.65 TYPE 2 DIABETES MELLITUS WITH HYPERGLYCEMIA, WITHOUT LONG-TERM CURRENT USE OF INSULIN (HCC): ICD-10-CM

## 2023-09-06 DIAGNOSIS — Z23 NEED FOR VACCINATION: ICD-10-CM

## 2023-09-06 PROCEDURE — 90677 PCV20 VACCINE IM: CPT

## 2023-09-06 PROCEDURE — 3079F DIAST BP 80-89 MM HG: CPT

## 2023-09-06 PROCEDURE — 90471 IMMUNIZATION ADMIN: CPT

## 2023-09-06 PROCEDURE — 3074F SYST BP LT 130 MM HG: CPT

## 2023-09-06 PROCEDURE — 99214 OFFICE O/P EST MOD 30 MIN: CPT | Mod: 25

## 2023-09-06 ASSESSMENT — FIBROSIS 4 INDEX: FIB4 SCORE: 1.48

## 2023-09-06 NOTE — PROGRESS NOTES
"CC:   Chief Complaint   Patient presents with    Medication Follow-up     Mourncandi         HISTORY OF PRESENT ILLNESS: Patient is a 52 y.o. female established patient who presents today to discuss the following problems below:     Type 2 diabetes mellitus with hyperglycemia, without long-term current use of insulin (HCC)  Patient was unfortunately not able to fill her tirzepatide secondary to insurance denial despite an adequate A1c coverage.  She is here to discuss alternative medication options in addition to her Jardiance 25 mg daily as well as her metformin extended release 1000 mg twice daily regimen.      Review of Systems: Otherwise negative except for as stated above.      Exam: /80   Pulse 80   Temp 36.1 °C (97 °F) (Temporal)   Resp 16   Ht 1.626 m (5' 4\")   Wt 100 kg (220 lb 6.4 oz)   SpO2 94%  Body mass index is 37.83 kg/m².    Physical Exam  Constitutional:       Appearance: Normal appearance.   Eyes:      Extraocular Movements: Extraocular movements intact.   Pulmonary:      Effort: Pulmonary effort is normal.   Musculoskeletal:      Cervical back: Normal range of motion.   Neurological:      General: No focal deficit present.      Mental Status: She is alert and oriented to person, place, and time.   Psychiatric:         Mood and Affect: Mood normal.         Behavior: Behavior normal.       Assessment/Plan:  52 y.o. female with the following -    1. Type 2 diabetes mellitus with hyperglycemia, without long-term current use of insulin (HCC)  Chronic, not at goal. Mounjaro denied by insurance.  Patient is open to a trial of Ozempic for better management of her type 2 diabetes.  Tapering ramp-up dose sent in as below.  Plan to follow-up in 3 months with A1c recheck, if she experiences adverse side effects and she can follow-up sooner.  Discussed side effects including constipation, which patient was aware of prior.  - Semaglutide, 1 MG/DOSE, 4 MG/3ML Solution Pen-injector; Inject 0.25 mg " under the skin every 7 days for 30 days, THEN 0.5 mg every 7 days for 30 days, THEN 1 mg every 7 days for 30 days.  Dispense: 4 mL; Refill: 1    2. Need for vaccination  - Pneumococcal Conjugate Vaccine 20-Valent (19 yrs+)      Follow-up: Return in about 3 months (around 12/6/2023) for a1c.    Health Maintenance: Completed      Please note that this dictation was created using voice recognition software. I have made every reasonable attempt to correct obvious errors, but I expect that there are errors of grammar and possibly content that I did not discover before finalizing the note.    Electronically signed by SHOBHA Anaya on September 6, 2023

## 2023-09-06 NOTE — ASSESSMENT & PLAN NOTE
Patient was unfortunately not able to fill her tirzepatide secondary to insurance denial despite an adequate A1c coverage.  She is here to discuss alternative medication options in addition to her Jardiance 25 mg daily as well as her metformin extended release 1000 mg twice daily regimen.

## 2023-09-11 DIAGNOSIS — E11.65 TYPE 2 DIABETES MELLITUS WITH HYPERGLYCEMIA, WITHOUT LONG-TERM CURRENT USE OF INSULIN (HCC): ICD-10-CM

## 2023-09-19 DIAGNOSIS — E11.69 HYPERLIPIDEMIA ASSOCIATED WITH TYPE 2 DIABETES MELLITUS (HCC): ICD-10-CM

## 2023-09-19 DIAGNOSIS — E78.5 HYPERLIPIDEMIA ASSOCIATED WITH TYPE 2 DIABETES MELLITUS (HCC): ICD-10-CM

## 2023-09-28 RX ORDER — ATORVASTATIN CALCIUM 20 MG/1
20 TABLET, FILM COATED ORAL
Qty: 90 TABLET | Refills: 0 | Status: SHIPPED | OUTPATIENT
Start: 2023-09-28 | End: 2024-01-04

## 2023-11-13 DIAGNOSIS — E11.65 TYPE 2 DIABETES MELLITUS WITH HYPERGLYCEMIA, WITHOUT LONG-TERM CURRENT USE OF INSULIN (HCC): ICD-10-CM

## 2023-11-17 RX ORDER — EMPAGLIFLOZIN 25 MG/1
TABLET, FILM COATED ORAL
Qty: 90 TABLET | Refills: 1 | Status: SHIPPED | OUTPATIENT
Start: 2023-11-17

## 2023-11-25 DIAGNOSIS — E11.65 TYPE 2 DIABETES MELLITUS WITH HYPERGLYCEMIA, WITHOUT LONG-TERM CURRENT USE OF INSULIN (HCC): ICD-10-CM

## 2023-11-27 NOTE — TELEPHONE ENCOUNTER
Received request via: Pharmacy    Was the patient seen in the last year in this department? Yes    Does the patient have an active prescription (recently filled or refills available) for medication(s) requested? No    Does the patient have [facility] Plus and need 100 day supply (blood pressure, diabetes and cholesterol meds only)? Pt doesn't have SCP   CT was removed.  Follow as outpatient for reaccumulation fo pleural fluid cytology was negative

## 2023-11-28 DIAGNOSIS — E11.65 TYPE 2 DIABETES MELLITUS WITH HYPERGLYCEMIA, WITHOUT LONG-TERM CURRENT USE OF INSULIN (HCC): ICD-10-CM

## 2023-11-28 RX ORDER — METFORMIN HYDROCHLORIDE 500 MG/1
TABLET, EXTENDED RELEASE ORAL
Qty: 360 TABLET | Refills: 0 | Status: SHIPPED | OUTPATIENT
Start: 2023-11-28 | End: 2024-02-29

## 2024-01-02 DIAGNOSIS — E78.5 HYPERLIPIDEMIA ASSOCIATED WITH TYPE 2 DIABETES MELLITUS (HCC): ICD-10-CM

## 2024-01-02 DIAGNOSIS — E11.69 HYPERLIPIDEMIA ASSOCIATED WITH TYPE 2 DIABETES MELLITUS (HCC): ICD-10-CM

## 2024-01-04 RX ORDER — ATORVASTATIN CALCIUM 20 MG/1
20 TABLET, FILM COATED ORAL
Qty: 90 TABLET | Refills: 0 | Status: SHIPPED | OUTPATIENT
Start: 2024-01-04

## 2024-02-24 DIAGNOSIS — E11.65 TYPE 2 DIABETES MELLITUS WITH HYPERGLYCEMIA, WITHOUT LONG-TERM CURRENT USE OF INSULIN (HCC): ICD-10-CM

## 2024-02-28 NOTE — TELEPHONE ENCOUNTER
Received request via: Pharmacy    Was the patient seen in the last year in this department? Yes    Does the patient have an active prescription (recently filled or refills available) for medication(s) requested? No    [facility] Name[facility]     Does the patient have half-way Plus and need 100 day supply (blood pressure, diabetes and cholesterol meds only)? Patient does not have SCP

## 2024-02-29 RX ORDER — DULOXETIN HYDROCHLORIDE 30 MG/1
30 CAPSULE, DELAYED RELEASE ORAL DAILY
Qty: 90 CAPSULE | Refills: 1 | Status: SHIPPED
Start: 2024-02-29

## 2024-02-29 RX ORDER — METFORMIN HYDROCHLORIDE 500 MG/1
TABLET, EXTENDED RELEASE ORAL
Qty: 360 TABLET | Refills: 0 | Status: SHIPPED | OUTPATIENT
Start: 2024-02-29

## 2024-04-04 DIAGNOSIS — E78.5 HYPERLIPIDEMIA ASSOCIATED WITH TYPE 2 DIABETES MELLITUS (HCC): ICD-10-CM

## 2024-04-04 DIAGNOSIS — E11.69 HYPERLIPIDEMIA ASSOCIATED WITH TYPE 2 DIABETES MELLITUS (HCC): ICD-10-CM

## 2024-04-24 RX ORDER — ATORVASTATIN CALCIUM 20 MG/1
20 TABLET, FILM COATED ORAL
Qty: 90 TABLET | Refills: 0 | Status: SHIPPED
Start: 2024-04-24

## 2024-04-24 NOTE — TELEPHONE ENCOUNTER
Received request via: Pharmacy    Was the patient seen in the last year in this department? Yes    Does the patient have an active prescription (recently filled or refills available) for medication(s) requested? No    [facility]    Does the patient have assisted Plus and need 100 day supply (blood pressure, diabetes and cholesterol meds only)? Patient does not have SCP

## 2024-05-05 DIAGNOSIS — E11.65 TYPE 2 DIABETES MELLITUS WITH HYPERGLYCEMIA, WITHOUT LONG-TERM CURRENT USE OF INSULIN (HCC): ICD-10-CM

## 2024-05-08 RX ORDER — EMPAGLIFLOZIN 25 MG/1
TABLET, FILM COATED ORAL
Qty: 90 TABLET | Refills: 1 | Status: SHIPPED | OUTPATIENT
Start: 2024-05-08

## 2024-05-26 DIAGNOSIS — E11.65 TYPE 2 DIABETES MELLITUS WITH HYPERGLYCEMIA, WITHOUT LONG-TERM CURRENT USE OF INSULIN (HCC): ICD-10-CM

## 2024-05-31 RX ORDER — METFORMIN HYDROCHLORIDE 500 MG/1
TABLET, EXTENDED RELEASE ORAL
Qty: 360 TABLET | Refills: 0 | Status: SHIPPED | OUTPATIENT
Start: 2024-05-31

## 2024-08-27 DIAGNOSIS — E11.65 TYPE 2 DIABETES MELLITUS WITH HYPERGLYCEMIA, WITHOUT LONG-TERM CURRENT USE OF INSULIN (HCC): ICD-10-CM

## 2024-08-28 NOTE — TELEPHONE ENCOUNTER
Received request via: Patient    Was the patient seen in the last year in this department? Yes    Does the patient have an active prescription (recently filled or refills available) for medication(s) requested? No    [facility]    Does the patient have custodial Plus and need 100-day supply? (This applies to ALL medications) Patient does not have SCP

## 2024-08-29 RX ORDER — DULOXETIN HYDROCHLORIDE 30 MG/1
30 CAPSULE, DELAYED RELEASE ORAL DAILY
Qty: 90 CAPSULE | Refills: 0 | Status: SHIPPED | OUTPATIENT
Start: 2024-08-29

## 2024-08-30 RX ORDER — METFORMIN HCL 500 MG
TABLET, EXTENDED RELEASE 24 HR ORAL
Qty: 360 TABLET | Refills: 0 | Status: SHIPPED | OUTPATIENT
Start: 2024-08-30
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
## 2024-10-27 DIAGNOSIS — E11.65 TYPE 2 DIABETES MELLITUS WITH HYPERGLYCEMIA, WITHOUT LONG-TERM CURRENT USE OF INSULIN (HCC): ICD-10-CM

## 2024-10-29 NOTE — TELEPHONE ENCOUNTER
Received request via: Patient    Was the patient seen in the last year in this department? Yes    Does the patient have an active prescription (recently filled or refills available) for medication(s) requested? No    [facility]    Does the patient have [facility] Plus and need 100-day supply? (This applies to ALL medications) Patient does not have SCP

## 2024-10-31 ENCOUNTER — OFFICE VISIT (OUTPATIENT)
Dept: URGENT CARE | Facility: PHYSICIAN GROUP | Age: 54
End: 2024-10-31
Payer: COMMERCIAL

## 2024-10-31 VITALS
DIASTOLIC BLOOD PRESSURE: 76 MMHG | BODY MASS INDEX: 33.44 KG/M2 | TEMPERATURE: 97.2 F | SYSTOLIC BLOOD PRESSURE: 102 MMHG | RESPIRATION RATE: 20 BRPM | HEIGHT: 64 IN | HEART RATE: 75 BPM | WEIGHT: 195.9 LBS | OXYGEN SATURATION: 98 %

## 2024-10-31 DIAGNOSIS — M62.830 SPASM OF RIGHT TRAPEZIUS MUSCLE: ICD-10-CM

## 2024-10-31 PROCEDURE — 3074F SYST BP LT 130 MM HG: CPT | Performed by: STUDENT IN AN ORGANIZED HEALTH CARE EDUCATION/TRAINING PROGRAM

## 2024-10-31 PROCEDURE — 3078F DIAST BP <80 MM HG: CPT | Performed by: STUDENT IN AN ORGANIZED HEALTH CARE EDUCATION/TRAINING PROGRAM

## 2024-10-31 PROCEDURE — 99213 OFFICE O/P EST LOW 20 MIN: CPT | Performed by: STUDENT IN AN ORGANIZED HEALTH CARE EDUCATION/TRAINING PROGRAM

## 2024-10-31 RX ORDER — MINOXIDIL 2.5 MG/1
TABLET ORAL DAILY
COMMUNITY
Start: 2024-08-11

## 2024-10-31 RX ORDER — SEMAGLUTIDE 1.34 MG/ML
INJECTION, SOLUTION SUBCUTANEOUS
COMMUNITY
Start: 2024-10-23

## 2024-10-31 RX ORDER — KETOROLAC TROMETHAMINE 15 MG/ML
15 INJECTION, SOLUTION INTRAMUSCULAR; INTRAVENOUS ONCE
Status: COMPLETED | OUTPATIENT
Start: 2024-10-31 | End: 2024-10-31

## 2024-10-31 RX ORDER — ESTRADIOL 1 MG/1
TABLET ORAL DAILY
COMMUNITY
Start: 2024-10-30

## 2024-10-31 RX ORDER — CYCLOBENZAPRINE HCL 5 MG
5 TABLET ORAL 3 TIMES DAILY PRN
Qty: 20 TABLET | Refills: 0 | Status: SHIPPED | OUTPATIENT
Start: 2024-10-31

## 2024-10-31 RX ORDER — FINASTERIDE 5 MG/1
5 TABLET, FILM COATED ORAL
COMMUNITY
Start: 2024-09-10

## 2024-10-31 RX ORDER — LIDOCAINE 50 MG/G
1 PATCH TOPICAL EVERY 24 HOURS
Qty: 10 PATCH | Refills: 0 | Status: SHIPPED | OUTPATIENT
Start: 2024-10-31

## 2024-10-31 RX ADMIN — KETOROLAC TROMETHAMINE 15 MG: 15 INJECTION, SOLUTION INTRAMUSCULAR; INTRAVENOUS at 17:44

## 2024-10-31 ASSESSMENT — ENCOUNTER SYMPTOMS: NECK PAIN: 1

## 2024-10-31 ASSESSMENT — FIBROSIS 4 INDEX: FIB4 SCORE: 1.54

## 2024-11-01 NOTE — PROGRESS NOTES
"Subjective     Radha Marlow is a 54 y.o. female who presents with Neck Pain (Neck and shoulder px for 3 weeks. Hit head on headboard, but no other trauma to area)            HPI    Review of Systems   Musculoskeletal:  Positive for neck pain.              Objective     /76 (BP Location: Right arm, Patient Position: Sitting, BP Cuff Size: Adult)   Pulse 75   Temp 36.2 °C (97.2 °F) (Temporal)   Resp 20   Ht 1.613 m (5' 3.5\")   Wt 88.9 kg (195 lb 14.4 oz)   LMP 03/01/2022   SpO2 98%   BMI 34.15 kg/m²      Physical Exam                        Assessment & Plan        Assessment & Plan  Spasm of right trapezius muscle    Orders:    ketorolac (Toradol) 15 MG/ML injection 15 mg    cyclobenzaprine (FLEXERIL) 5 mg tablet; Take 1 Tablet by mouth 3 times a day as needed for Moderate Pain or Muscle Spasms.    lidocaine (LIDODERM) 5 % Patch; Place 1 Patch on the skin every 24 hours.                  " Effort: Pulmonary effort is normal.   Musculoskeletal:      Cervical back: Spasms and tenderness present. No swelling, deformity or rigidity.      Thoracic back: Normal.      Lumbar back: Normal.        Back:    Lymphadenopathy:      Cervical: No cervical adenopathy.   Skin:     General: Skin is warm and dry.   Neurological:      General: No focal deficit present.      Mental Status: She is alert and oriented to person, place, and time.                             Assessment & Plan        Assessment & Plan  Spasm of right trapezius muscle    Orders:    ketorolac (Toradol) 15 MG/ML injection 15 mg    cyclobenzaprine (FLEXERIL) 5 mg tablet; Take 1 Tablet by mouth 3 times a day as needed for Moderate Pain or Muscle Spasms.    lidocaine (LIDODERM) 5 % Patch; Place 1 Patch on the skin every 24 hours.      Treatment of cyclobenzaprine - Discussed with patient this medication can cause drowsiness/sedation. The patient was instructed to use medication mostly during the evening/night time. Instructed to avoid driving, operating machinery, or drinking alcohol while using this medication. Patient verbalized understanding and agreement.      Differential diagnoses, supportive care measures (rest, heat, OTC tylenol, gentle ROM/stretching) and indications for immediate follow-up discussed with patient. Pathogenesis of diagnosis discussed including typical length and natural progression.  Follow up with PCP.    Instructed to return to urgent care or nearest emergency department if symptoms fail to improve, for any change in condition, further concerns, or new concerning symptoms.    Patient states understanding and agrees with the plan of care and discharge instructions.

## 2024-11-04 RX ORDER — EMPAGLIFLOZIN 25 MG/1
TABLET, FILM COATED ORAL
Qty: 90 TABLET | Refills: 1 | Status: SHIPPED | OUTPATIENT
Start: 2024-11-04

## 2024-11-05 ASSESSMENT — ENCOUNTER SYMPTOMS
PARESIS: 0
PHOTOPHOBIA: 0
SYNCOPE: 0
TROUBLE SWALLOWING: 0
TINGLING: 0
NUMBNESS: 0
LEG PAIN: 0
FEVER: 0
WEAKNESS: 0
HEADACHES: 0
VISUAL CHANGE: 0

## 2024-11-29 DIAGNOSIS — E11.65 TYPE 2 DIABETES MELLITUS WITH HYPERGLYCEMIA, WITHOUT LONG-TERM CURRENT USE OF INSULIN (HCC): ICD-10-CM

## 2024-12-03 RX ORDER — DULOXETIN HYDROCHLORIDE 30 MG/1
30 CAPSULE, DELAYED RELEASE ORAL DAILY
Qty: 90 CAPSULE | Refills: 3 | Status: SHIPPED | OUTPATIENT
Start: 2024-12-03

## 2024-12-04 RX ORDER — METFORMIN HYDROCHLORIDE 500 MG/1
TABLET, EXTENDED RELEASE ORAL
Qty: 360 TABLET | Refills: 0 | Status: SHIPPED | OUTPATIENT
Start: 2024-12-04

## 2024-12-18 ENCOUNTER — OFFICE VISIT (OUTPATIENT)
Dept: MEDICAL GROUP | Facility: PHYSICIAN GROUP | Age: 54
End: 2024-12-18
Payer: COMMERCIAL

## 2024-12-18 ENCOUNTER — HOSPITAL ENCOUNTER (OUTPATIENT)
Dept: LAB | Facility: MEDICAL CENTER | Age: 54
End: 2024-12-18
Attending: FAMILY MEDICINE
Payer: COMMERCIAL

## 2024-12-18 VITALS
TEMPERATURE: 97.8 F | DIASTOLIC BLOOD PRESSURE: 70 MMHG | HEART RATE: 76 BPM | RESPIRATION RATE: 16 BRPM | BODY MASS INDEX: 33.12 KG/M2 | HEIGHT: 64 IN | OXYGEN SATURATION: 97 % | SYSTOLIC BLOOD PRESSURE: 118 MMHG | WEIGHT: 194 LBS

## 2024-12-18 DIAGNOSIS — E11.69 HYPERLIPIDEMIA ASSOCIATED WITH TYPE 2 DIABETES MELLITUS (HCC): ICD-10-CM

## 2024-12-18 DIAGNOSIS — Z00.00 WELLNESS EXAMINATION: ICD-10-CM

## 2024-12-18 DIAGNOSIS — E66.01 CLASS 2 SEVERE OBESITY DUE TO EXCESS CALORIES WITH SERIOUS COMORBIDITY IN ADULT, UNSPECIFIED BMI (HCC): ICD-10-CM

## 2024-12-18 DIAGNOSIS — E78.5 HYPERLIPIDEMIA ASSOCIATED WITH TYPE 2 DIABETES MELLITUS (HCC): ICD-10-CM

## 2024-12-18 DIAGNOSIS — E66.812 CLASS 2 SEVERE OBESITY DUE TO EXCESS CALORIES WITH SERIOUS COMORBIDITY IN ADULT, UNSPECIFIED BMI (HCC): ICD-10-CM

## 2024-12-18 LAB
APPEARANCE UR: ABNORMAL
BACTERIA #/AREA URNS HPF: ABNORMAL /HPF
BASOPHILS # BLD AUTO: 0.8 % (ref 0–1.8)
BASOPHILS # BLD: 0.04 K/UL (ref 0–0.12)
BILIRUB UR QL STRIP.AUTO: NEGATIVE
CASTS URNS QL MICRO: ABNORMAL /LPF (ref 0–2)
COLOR UR: YELLOW
EOSINOPHIL # BLD AUTO: 0.08 K/UL (ref 0–0.51)
EOSINOPHIL NFR BLD: 1.5 % (ref 0–6.9)
EPITHELIAL CELLS 1715: ABNORMAL /HPF (ref 0–5)
ERYTHROCYTE [DISTWIDTH] IN BLOOD BY AUTOMATED COUNT: 42.5 FL (ref 35.9–50)
EST. AVERAGE GLUCOSE BLD GHB EST-MCNC: 108 MG/DL
GLUCOSE UR STRIP.AUTO-MCNC: >=1000 MG/DL
HBA1C MFR BLD: 5.4 % (ref 4–5.6)
HCT VFR BLD AUTO: 44.1 % (ref 37–47)
HGB BLD-MCNC: 15.2 G/DL (ref 12–16)
IMM GRANULOCYTES # BLD AUTO: 0.01 K/UL (ref 0–0.11)
IMM GRANULOCYTES NFR BLD AUTO: 0.2 % (ref 0–0.9)
KETONES UR STRIP.AUTO-MCNC: NEGATIVE MG/DL
LEUKOCYTE ESTERASE UR QL STRIP.AUTO: ABNORMAL
LYMPHOCYTES # BLD AUTO: 1.74 K/UL (ref 1–4.8)
LYMPHOCYTES NFR BLD: 33.3 % (ref 22–41)
MCH RBC QN AUTO: 32.2 PG (ref 27–33)
MCHC RBC AUTO-ENTMCNC: 34.5 G/DL (ref 32.2–35.5)
MCV RBC AUTO: 93.4 FL (ref 81.4–97.8)
MICRO URNS: ABNORMAL
MONOCYTES # BLD AUTO: 0.56 K/UL (ref 0–0.85)
MONOCYTES NFR BLD AUTO: 10.7 % (ref 0–13.4)
NEUTROPHILS # BLD AUTO: 2.79 K/UL (ref 1.82–7.42)
NEUTROPHILS NFR BLD: 53.5 % (ref 44–72)
NITRITE UR QL STRIP.AUTO: NEGATIVE
NRBC # BLD AUTO: 0 K/UL
NRBC BLD-RTO: 0 /100 WBC (ref 0–0.2)
PH UR STRIP.AUTO: 5.5 [PH] (ref 5–8)
PLATELET # BLD AUTO: 235 K/UL (ref 164–446)
PMV BLD AUTO: 10.2 FL (ref 9–12.9)
PROT UR QL STRIP: NEGATIVE MG/DL
RBC # BLD AUTO: 4.72 M/UL (ref 4.2–5.4)
RBC # URNS HPF: ABNORMAL /HPF (ref 0–2)
RBC UR QL AUTO: NEGATIVE
SP GR UR STRIP.AUTO: 1.03
UROBILINOGEN UR STRIP.AUTO-MCNC: 0.2 EU/DL
WBC # BLD AUTO: 5.2 K/UL (ref 4.8–10.8)
WBC #/AREA URNS HPF: ABNORMAL /HPF

## 2024-12-18 PROCEDURE — 80053 COMPREHEN METABOLIC PANEL: CPT

## 2024-12-18 PROCEDURE — 3074F SYST BP LT 130 MM HG: CPT | Performed by: FAMILY MEDICINE

## 2024-12-18 PROCEDURE — 82570 ASSAY OF URINE CREATININE: CPT

## 2024-12-18 PROCEDURE — 3078F DIAST BP <80 MM HG: CPT | Performed by: FAMILY MEDICINE

## 2024-12-18 PROCEDURE — 80061 LIPID PANEL: CPT

## 2024-12-18 PROCEDURE — 83036 HEMOGLOBIN GLYCOSYLATED A1C: CPT

## 2024-12-18 PROCEDURE — 81001 URINALYSIS AUTO W/SCOPE: CPT

## 2024-12-18 PROCEDURE — 99396 PREV VISIT EST AGE 40-64: CPT | Performed by: FAMILY MEDICINE

## 2024-12-18 PROCEDURE — 85025 COMPLETE CBC W/AUTO DIFF WBC: CPT

## 2024-12-18 PROCEDURE — 36415 COLL VENOUS BLD VENIPUNCTURE: CPT

## 2024-12-18 PROCEDURE — 82043 UR ALBUMIN QUANTITATIVE: CPT

## 2024-12-18 ASSESSMENT — FIBROSIS 4 INDEX: FIB4 SCORE: 1.54

## 2024-12-18 ASSESSMENT — PATIENT HEALTH QUESTIONNAIRE - PHQ9: CLINICAL INTERPRETATION OF PHQ2 SCORE: 0

## 2024-12-18 NOTE — ASSESSMENT & PLAN NOTE
This is a chronic problem.  She states she was put on Ozempic over a year ago.  She has lost weight down to 190 but seems to have stabilized.  She is not sure if she can go higher or not.

## 2024-12-18 NOTE — PROGRESS NOTES
Subjective:     CC: Here for exam and labs.    HPI:   Radha presents today with the following medical concerns:    Wellness examination  Patient has not been seen for over a year nor had labs done in over a year by her PCP.  Here today for evaluation and to get baseline labs.  She states overall she is feeling well.  She is also past due for her mammogram and she will schedule that.  She is having her eye exam done today and will get us those records.  She is also considering changing to a clinic closer to her home.    Hyperlipidemia associated with type 2 diabetes mellitus (HCC)  This is a chronic problem.  Labs have not been done in over a year.  The be ordered today.  Should be notified on the results.    Class 2 severe obesity due to excess calories with serious comorbidity in adult ([facility])  This is a chronic problem.  She states she was put on Ozempic over a year ago.  She has lost weight down to 190 but seems to have stabilized.  She is not sure if she can go higher or not.    Past Medical History:   Diagnosis Date    Allergy     Anemia     Arthritis     Hyperlipidemia     Hypertriglyceridemia 3/17/2010    Obesity (BMI 35.0-39.9 without comorbidity) ([facility]) 3/27/2019       Social History     Tobacco Use    Smoking status: Former     Current packs/day: 0.00     Average packs/day: 0.3 packs/day for 1 year (0.3 ttl pk-yrs)     Types: Cigarettes     Start date: 1990     Quit date: 1991     Years since quittin.9    Smokeless tobacco: Never   Vaping Use    Vaping status: Never Used   Substance Use Topics    Alcohol use: No     Alcohol/week: 0.0 oz     Comment: [employer] prohibits    Drug use: No       Current Outpatient Medications Ordered in Epic   Medication Sig Dispense Refill    metFORMIN ER (GLUCOPHAGE XR) 500 MG TABLET SR 24 HR TAKE 2 TABLETS BY MOUTH TWICE A  Tablet 0    DULoxetine (CYMBALTA) 30 MG Cap DR Particles TAKE 1 CAPSULE BY MOUTH EVERY DAY 90 Capsule 3    atorvastatin (LIPITOR) 20 MG  "Tab TAKE 1 TABLET BY MOUTH EVERY DAY 90 Tablet 0    JARDIANCE 25 MG Tab TAKE 1 TABLET BY MOUTH EVERY DAY 90 Tablet 1    OZEMPIC, 1 MG/DOSE, 4 MG/3ML Solution Pen-injector every 7 days.      finasteride (PROSCAR) 5 MG Tab Take 5 mg by mouth every day.      minoxidil (LONITEN) 2.5 MG Tab every day.      estradiol (ESTRACE) 1 MG Tab every day.      cyclobenzaprine (FLEXERIL) 5 mg tablet Take 1 Tablet by mouth 3 times a day as needed for Moderate Pain or Muscle Spasms. 20 Tablet 0    VITAMIN D PO Take  by mouth.      estradiol (ESTRACE) 0.1 MG/GM vaginal cream Insert 0.5 g into the vagina every day. Use daily for 2 weeks and then twice a week. 42.5 g 5    ONETOUCH ULTRA strip USE ONE INSURANCE PREFERRED STRIP TO TEST BLOOD SUGAR TWICE DAILY. 100 Strip 3    Blood Glucose Meter Kit Test blood sugar as recommended by provider. Insurance preferred blood glucose monitoring kit. 1 Kit 0    Blood Glucose Test Strips Use one insurance preferred strip to test blood sugar twice daily. 100 Strip 3    Lancets Use one insurance preferred lancet to test blood sugar twice daily. 100 Each 3    Alcohol Swabs Wipe site with prep pad prior to injection. 100 Each 3     No current Epic-ordered facility-administered medications on file.       Allergies:  Bactrim [sulfamethoxazole w-trimethoprim]    Health Maintenance: Completed    ROS:  Gen: no fevers/chills,   Eyes: no changes in vision  ENT: no sore throat, no hearing loss, no bloody nose  Pulm: no sob, no cough  CV: no chest pain, no palpitations  GI: no nausea/vomiting, no diarrhea  : no dysuria  MSk: no myalgias  Skin: no rash  Neuro: no headaches, no numbness/tingling  Heme/Lymph: no easy bruising      Objective:       Exam:  /70 (BP Location: Right arm, Patient Position: Sitting, BP Cuff Size: Adult)   Pulse 76   Temp 36.6 °C (97.8 °F) (Temporal)   Resp 16   Ht 1.626 m (5' 4\")   Wt 88 kg (194 lb)   LMP 03/01/2022   SpO2 97%   BMI 33.30 kg/m²  Body mass index is 33.3 " kg/m².    Gen: Alert and oriented, No apparent distress.  Eyes:   Extraocular motions intact.  No scleral icterus seen.  Ears:    Ear canals and TMs are clear.  Neck: Neck is supple without lymphadenopathy.  Thyroid exam is normal.  Lungs: Normal effort, CTA bilaterally, no wheezes, rhonchi, or rales  CV: Regular rate and rhythm. No murmurs, rubs, or gallops.  No carotid bruits heard.  Abdomen: Soft, nontender, no organomegaly or masses.  Ext: No clubbing, cyanosis, edema.  Neuro: Cranial nerves II through VIII are grossly intact.  No lateralizing signs are seen.  Gait is normal.  Monofilament testing with a 10 gram force: sensation intact: intact bilaterally  Visual Inspection: Feet without maceration, ulcers, fissures.  Pedal pulses: intact bilaterally      Labs: Ordered    Assessment & Plan:     54 y.o. female with the following -     1. Hyperlipidemia associated with type 2 diabetes mellitus (HCC)  These are both chronic problems.  Labs ordered for follow-up.  - Comp Metabolic Panel; Future  - HEMOGLOBIN A1C; Future  - Lipid Profile; Future  - URINALYSIS,CULTURE IF INDICATED; Future  - ESTIMATED GFR; Future  - MICROALBUMIN CREAT RATIO URINE; Future    2. Wellness examination  Patient's exam appeared unremarkable.  General healthcare issues addressed.  I did talk to her about getting diabetic labs done every 3 months and annual labs at least once a year.  Also to follow-up with the PCP at least once a year as well.  - Comp Metabolic Panel; Future  - Lipid Profile; Future  - URINALYSIS,CULTURE IF INDICATED; Future  - ESTIMATED GFR; Future  - CBC WITH DIFFERENTIAL; Future    3. Class 2 severe obesity due to excess calories with serious comorbidity in adult, unspecified BMI (HCC)  This is a chronic problem.  It has improved over the last year.  I told her we will see what her labs show and then when she gets established with her PCP she can discuss whether to increase the Ozempic or not.  She states she rarely has  episodes where she feels like she is hypoglycemic and she is to continue to monitor for that.    Anticipatory guidance: Regular routine screening labs discussed as well as test for cancer prevention.  Return in about 3 months (around 3/18/2025) for Short with PCP.    Please note that this dictation was created using voice recognition software. I have made every reasonable attempt to correct obvious errors, but I expect that there are errors of grammar and possibly content that I did not discover before finalizing the note.

## 2024-12-18 NOTE — ASSESSMENT & PLAN NOTE
Patient has not been seen for over a year nor had labs done in over a year by her PCP.  Here today for evaluation and to get baseline labs.  She states overall she is feeling well.  She is also past due for her mammogram and she will schedule that.  She is having her eye exam done today and will get us those records.  She is also considering changing to a clinic closer to her home.

## 2024-12-18 NOTE — ASSESSMENT & PLAN NOTE
This is a chronic problem.  Labs have not been done in over a year.  The be ordered today.  Should be notified on the results.

## 2024-12-19 LAB
ALBUMIN SERPL BCP-MCNC: 4.2 G/DL (ref 3.2–4.9)
ALBUMIN/GLOB SERPL: 1.3 G/DL
ALP SERPL-CCNC: 58 U/L (ref 30–99)
ALT SERPL-CCNC: 23 U/L (ref 2–50)
ANION GAP SERPL CALC-SCNC: 9 MMOL/L (ref 7–16)
AST SERPL-CCNC: 22 U/L (ref 12–45)
BILIRUB SERPL-MCNC: 0.4 MG/DL (ref 0.1–1.5)
BUN SERPL-MCNC: 19 MG/DL (ref 8–22)
CALCIUM ALBUM COR SERPL-MCNC: 9.4 MG/DL (ref 8.5–10.5)
CALCIUM SERPL-MCNC: 9.6 MG/DL (ref 8.5–10.5)
CHLORIDE SERPL-SCNC: 104 MMOL/L (ref 96–112)
CHOLEST SERPL-MCNC: 147 MG/DL (ref 100–199)
CO2 SERPL-SCNC: 25 MMOL/L (ref 20–33)
CREAT SERPL-MCNC: 0.86 MG/DL (ref 0.5–1.4)
CREAT UR-MCNC: 101.73 MG/DL
FASTING STATUS PATIENT QL REPORTED: NORMAL
GFR SERPLBLD CREATININE-BSD FMLA CKD-EPI: 81 ML/MIN/1.73 M 2
GLOBULIN SER CALC-MCNC: 3.2 G/DL (ref 1.9–3.5)
GLUCOSE SERPL-MCNC: 90 MG/DL (ref 65–99)
HDLC SERPL-MCNC: 54 MG/DL
LDLC SERPL CALC-MCNC: 62 MG/DL
MICROALBUMIN UR-MCNC: <1.2 MG/DL
MICROALBUMIN/CREAT UR: NORMAL MG/G (ref 0–30)
POTASSIUM SERPL-SCNC: 4.5 MMOL/L (ref 3.6–5.5)
PROT SERPL-MCNC: 7.4 G/DL (ref 6–8.2)
SODIUM SERPL-SCNC: 138 MMOL/L (ref 135–145)
TRIGL SERPL-MCNC: 156 MG/DL (ref 0–149)

## 2025-01-18 ENCOUNTER — OFFICE VISIT (OUTPATIENT)
Dept: URGENT CARE | Facility: PHYSICIAN GROUP | Age: 55
End: 2025-01-18
Payer: COMMERCIAL

## 2025-01-18 VITALS
SYSTOLIC BLOOD PRESSURE: 106 MMHG | HEART RATE: 84 BPM | DIASTOLIC BLOOD PRESSURE: 68 MMHG | BODY MASS INDEX: 32.44 KG/M2 | RESPIRATION RATE: 16 BRPM | TEMPERATURE: 97.4 F | HEIGHT: 64 IN | OXYGEN SATURATION: 96 % | WEIGHT: 190 LBS

## 2025-01-18 DIAGNOSIS — F41.9 ANXIETY: ICD-10-CM

## 2025-01-18 PROCEDURE — 3078F DIAST BP <80 MM HG: CPT | Performed by: FAMILY MEDICINE

## 2025-01-18 PROCEDURE — 3074F SYST BP LT 130 MM HG: CPT | Performed by: FAMILY MEDICINE

## 2025-01-18 PROCEDURE — 99214 OFFICE O/P EST MOD 30 MIN: CPT | Performed by: FAMILY MEDICINE

## 2025-01-18 RX ORDER — ALPRAZOLAM 0.5 MG
0.5 TABLET ORAL EVERY 8 HOURS PRN
Qty: 10 TABLET | Refills: 0 | Status: SHIPPED | OUTPATIENT
Start: 2025-01-18 | End: 2025-01-25

## 2025-01-18 ASSESSMENT — FIBROSIS 4 INDEX: FIB4 SCORE: 1.03

## 2025-01-18 NOTE — PROGRESS NOTES
"Chief Complaint   Patient presents with    Dizziness     X4 DAYS                 No HA, vertigo      Feels off balance    Has been under a lot of stress at work recently.       No n/v         Denies cough       Denies chest pain, sob.     No headaches          Denies:  dizziness, nausea, phonophobia and photophobia.       Pertinent negatives include no abdominal pain or fever.          Social History     Tobacco Use    Smoking status: Former     Current packs/day: 0.00     Average packs/day: 0.3 packs/day for 1 year (0.3 ttl pk-yrs)     Types: Cigarettes     Start date: 1990     Quit date: 1991     Years since quittin.0    Smokeless tobacco: Never   Vaping Use    Vaping status: Never Used   Substance Use Topics    Alcohol use: No     Alcohol/week: 0.0 oz     Comment: [employer] prohibits    Drug use: No           Past Medical History:   Diagnosis Date    Allergy     Anemia     Arthritis     Hyperlipidemia     Hypertriglyceridemia 3/17/2010    Obesity (BMI 35.0-39.9 without comorbidity) ([facility]) 3/27/2019            Review of Systems   Constitutional: Negative for fever, chills and malaise/fatigue.   Eyes: Negative for vision changes, d/c.    Respiratory: Negative for cough and sputum production.    Cardiovascular: Negative for chest pain and palpitations.   Gastrointestinal: Negative for nausea, vomiting, abdominal pain, diarrhea and constipation.   Genitourinary: Negative for dysuria, urgency and frequency.   Skin: Negative for rash or  itching.   Neurological: Negative for dizziness and vertigo.   Psychiatric/Behavioral: Negative for depression.   Hematologic/lymphatic - denies bruising or excessive bleeding  All other systems reviewed and are negative.           Objective:     /68 (BP Location: Left arm, Patient Position: Sitting, BP Cuff Size: Adult)   Pulse 84   Temp 36.3 °C (97.4 °F) (Temporal)   Resp 16   Ht 1.626 m (5' 4\")   Wt 86.2 kg (190 lb)   SpO2 96%     Physical Exam "   Constitutional: pt is oriented to person, place, and time.  Pt appears well-developed and well-nourished. No distress.   HENT:   Head: Normocephalic and atraumatic.   Mouth/Throat: Oropharynx is clear and moist. No oropharyngeal exudate.   Eyes: Conjunctivae and EOM are normal. Pupils are equal, round, and reactive to light. Right eye exhibits no discharge. Left eye exhibits no discharge. No scleral icterus.   Neck: Neck supple.   Cardiovascular: Normal rate and regular rhythm.    Pulmonary/Chest: Effort normal.   Lymphadenopathy:     Pt has no cervical adenopathy.   Neurologic: Alert and oriented. Cranial nerves II-XII intact, EOMs intact, no tongue deviation, PERRL, no facial asymmetry to motor or sensation, symmetric palate, normal finger-to-nose test, no pronator drift. No focal motor deficits. Symmetric reflexes. Normal station and gait, normal tandem walk. Coordination normal.   Skin: Skin is warm. Pt is not diaphoretic. No erythema. No pallor.   Psychiatric:  behavior is normal.   Nursing note and vitals reviewed.              Assessment/Plan:         1.  Lightheadedness    Suspect anxiety - sx started in association with workplace stress      Unlikely underlying physical issue.         - ALPRAZolam (XANAX) 0.5 MG Tab; Take 1 Tablet by mouth every 8 hours as needed for Sleep for up to 7 days. Indications: Feeling Anxious, Panic Disorder  Dispense: 10 Tablet; Refill: 0  - Comp Metabolic Panel; Future  - CBC WITH DIFFERENTIAL; Future  - TSH; Future  - Referral to Behavioral Health        Narcotic use report obtained with no indication of narcotic overuse or misuse and deemed necessary for treaatment. Sedation, dependence, and constipation precautions given. Avoid use while driving or operating heavy machinery.     Differential diagnosis, natural history, supportive care, and indications for immediate follow-up discussed. All questions answered. Patient agrees with the plan of care.     Follow-up as needed if  symptoms worsen or fail to improve to PCP, Urgent care or Emergency Room.     I have personally reviewed prior external notes and test results pertinent to today's visit.  I have independently reviewed and interpreted all diagnostics ordered during this urgent care acute visit.

## 2025-02-06 DIAGNOSIS — E78.5 HYPERLIPIDEMIA ASSOCIATED WITH TYPE 2 DIABETES MELLITUS (HCC): ICD-10-CM

## 2025-02-06 DIAGNOSIS — E11.69 HYPERLIPIDEMIA ASSOCIATED WITH TYPE 2 DIABETES MELLITUS (HCC): ICD-10-CM

## 2025-02-06 NOTE — TELEPHONE ENCOUNTER
Received request via: Patient    Was the patient seen in the last year in this department? Yes    Does the patient have an active prescription (recently filled or refills available) for medication(s) requested? No    [facility]    Does the patient have [facility] and need 100-day supply? (This applies to ALL medications) Patient does not have SCP

## 2025-02-10 ENCOUNTER — OFFICE VISIT (OUTPATIENT)
Dept: MEDICAL GROUP | Facility: PHYSICIAN GROUP | Age: 55
End: 2025-02-10
Payer: COMMERCIAL

## 2025-02-10 VITALS
WEIGHT: 196 LBS | RESPIRATION RATE: 20 BRPM | HEART RATE: 94 BPM | HEIGHT: 64 IN | TEMPERATURE: 97.3 F | BODY MASS INDEX: 33.46 KG/M2 | SYSTOLIC BLOOD PRESSURE: 114 MMHG | DIASTOLIC BLOOD PRESSURE: 86 MMHG | OXYGEN SATURATION: 94 %

## 2025-02-10 DIAGNOSIS — E11.65 TYPE 2 DIABETES MELLITUS WITH HYPERGLYCEMIA, WITHOUT LONG-TERM CURRENT USE OF INSULIN (HCC): ICD-10-CM

## 2025-02-10 DIAGNOSIS — E78.5 HYPERLIPIDEMIA ASSOCIATED WITH TYPE 2 DIABETES MELLITUS (HCC): ICD-10-CM

## 2025-02-10 DIAGNOSIS — E11.69 HYPERLIPIDEMIA ASSOCIATED WITH TYPE 2 DIABETES MELLITUS (HCC): ICD-10-CM

## 2025-02-10 DIAGNOSIS — Z12.31 ENCOUNTER FOR SCREENING MAMMOGRAM FOR MALIGNANT NEOPLASM OF BREAST: ICD-10-CM

## 2025-02-10 PROCEDURE — 3074F SYST BP LT 130 MM HG: CPT

## 2025-02-10 PROCEDURE — 99214 OFFICE O/P EST MOD 30 MIN: CPT

## 2025-02-10 PROCEDURE — 3079F DIAST BP 80-89 MM HG: CPT

## 2025-02-10 RX ORDER — METFORMIN HYDROCHLORIDE 500 MG/1
500 TABLET, EXTENDED RELEASE ORAL 2 TIMES DAILY
Qty: 180 TABLET | Refills: 2 | Status: SHIPPED | OUTPATIENT
Start: 2025-02-10 | End: 2025-05-11

## 2025-02-10 RX ORDER — EMPAGLIFLOZIN 25 MG/1
1 TABLET, FILM COATED ORAL
Qty: 90 TABLET | Refills: 3 | Status: SHIPPED | OUTPATIENT
Start: 2025-02-10

## 2025-02-10 RX ORDER — ATORVASTATIN CALCIUM 20 MG/1
20 TABLET, FILM COATED ORAL
Qty: 90 TABLET | Refills: 0 | OUTPATIENT
Start: 2025-02-10

## 2025-02-10 RX ORDER — SEMAGLUTIDE 1.34 MG/ML
1 INJECTION, SOLUTION SUBCUTANEOUS
Qty: 9 ML | Refills: 2 | Status: SHIPPED | OUTPATIENT
Start: 2025-02-10

## 2025-02-10 RX ORDER — ATORVASTATIN CALCIUM 20 MG/1
20 TABLET, FILM COATED ORAL
Qty: 90 TABLET | Refills: 3 | Status: SHIPPED | OUTPATIENT
Start: 2025-02-10

## 2025-02-10 ASSESSMENT — FIBROSIS 4 INDEX: FIB4 SCORE: 1.03

## 2025-02-10 ASSESSMENT — PATIENT HEALTH QUESTIONNAIRE - PHQ9: CLINICAL INTERPRETATION OF PHQ2 SCORE: 0

## 2025-02-13 NOTE — PROGRESS NOTES
Verbal consent was acquired by the patient to use [employer] ambient listening note generation during this visit     Subjective:     HPI:   History of Present Illness  The patient is a 54-year-old female presenting for a follow-up regarding her medication regimen.    She has a history of type 2 diabetes mellitus, currently managed with Ozempic 1 mg weekly, metformin  mg daily, and Jardiance 25 mg daily. The patient reports stable health with no significant changes since her last visit. Laboratory results from December 2024 indicated a hemoglobin A1c of 5.4%. She expresses satisfaction with Ozempic's efficacy in appetite suppression. Her dietary intake typically consists of two meals per day, with occasional omission of dinner. She no longer experiences persistent food-related thoughts. The patient has had two episodes of hypoglycemia, characterized by diaphoresis and a sense of unease. She has been increasing her protein intake and reducing carbohydrate consumption. Her breakfast usually includes eggs, and she often consumes a protein shake while at work. She is interested in reducing her metformin dosage and reports that she is running low on both metformin and Ozempic.    The patient requires a refill of atorvastatin 20 mg to be taken at bedtime.    Her weight fluctuates between 190-194 lbs, with a recent measurement of 196 lbs. She avoids daily weight monitoring to prevent anxiety. She reports no pain during physical activities, such as jogging on the treadmill.    FAMILY HISTORY  Brother has poorly controlled diabetes.    MEDICATIONS  Current: Ozempic 1 mg weekly, metformin extended release 500 mg daily, atorvastatin 20 mg q.h.s., Jardiance 25 mg daily        Assessment & Plan:     Problem List Items Addressed This Visit       Hyperlipidemia associated with type 2 diabetes mellitus (HCC)    Relevant Medications    metFORMIN ER (GLUCOPHAGE XR) 500 MG TABLET SR 24 HR    atorvastatin (LIPITOR) 20 MG Tab     Empagliflozin (JARDIANCE) 25 MG Tab    OZEMPIC, 1 MG/DOSE, 4 MG/3ML Solution Pen-injector     Other Visit Diagnoses         Encounter for screening mammogram for malignant neoplasm of breast        Relevant Orders    MA-SCREENING MAMMO BILAT W/TOMOSYNTHESIS W/CAD      Type 2 diabetes mellitus with hyperglycemia, without long-term current use of insulin (HCC)        Relevant Medications    metFORMIN ER (GLUCOPHAGE XR) 500 MG TABLET SR 24 HR    Empagliflozin (JARDIANCE) 25 MG Tab    OZEMPIC, 1 MG/DOSE, 4 MG/3ML Solution Pen-injector              Assessment & Plan  1. Type 2 Diabetes Mellitus: Stable. A1c improved to 5.4. Managed with Ozempic 1 mg weekly, metformin  mg daily, and Jardiance 25 mg daily.  - Reduce metformin dosage by half, either 2 tablets once daily or 1 tablet BID.  - A1c test in 3 months without metformin to assess further adjustments.  - Refills for metformin and Ozempic sent.  - Depending on results, may discontinue metformin and increase Ozempic.    2. Hyperlipidemia: Stable. LDL well controlled at 62.  - Continue atorvastatin 20 mg q.h.s.  - Refill sent.    3. Weight management.  - Weight fluctuates between 190-194 lbs, recently 196 lbs.  - Does not monitor weight daily to avoid anxiety.    Follow-up  - A1c test in 3 months.  - 3-month follow-up visit.      Health Maintenance: Orders placed as applicable to patient     Objective:     Exam:  Objective:  Vitals:    02/10/25 1544   BP: 114/86   Pulse: 94   Resp: 20   Temp: 36.3 °C (97.3 °F)   SpO2: 94%     Physical Exam  Physical Exam    Constitutional:       Appearance: Normal appearance.   Eyes:      Extraocular Movements: Extraocular movements intact.   Pulmonary:      Effort: Pulmonary effort is normal.   Neurological:      General: No focal deficit present.      Mental Status: She is alert and oriented to person, place, and time.   Psychiatric:         Mood and Affect: Mood normal.         Behavior: Behavior normal.       Return in about 3  months (around 5/10/2025) for a1c.    Please note that this dictation was created using voice recognition software. I have made every reasonable attempt to correct obvious errors, but I expect that there are errors of grammar and possibly content that I did not discover before finalizing the note.

## 2025-03-02 DIAGNOSIS — E11.65 TYPE 2 DIABETES MELLITUS WITH HYPERGLYCEMIA, WITHOUT LONG-TERM CURRENT USE OF INSULIN (HCC): ICD-10-CM

## 2025-03-03 NOTE — TELEPHONE ENCOUNTER
Received request via: Pharmacy    Was the patient seen in the last year in this department? Yes    Does the patient have an active prescription (recently filled or refills available) for medication(s) requested? No    [facility]    Does the patient have long-term Plus and need 100-day supply? (This applies to ALL medications) Patient does not have SCP

## 2025-03-04 RX ORDER — METFORMIN HYDROCHLORIDE 500 MG/1
1000 TABLET, EXTENDED RELEASE ORAL 2 TIMES DAILY
Qty: 360 TABLET | Refills: 3 | Status: SHIPPED | OUTPATIENT
Start: 2025-03-04

## 2025-03-05 ENCOUNTER — APPOINTMENT (OUTPATIENT)
Dept: RADIOLOGY | Facility: MEDICAL CENTER | Age: 55
End: 2025-03-05
Payer: COMMERCIAL

## 2025-03-18 ENCOUNTER — HOSPITAL ENCOUNTER (OUTPATIENT)
Dept: RADIOLOGY | Facility: MEDICAL CENTER | Age: 55
End: 2025-03-18
Payer: COMMERCIAL

## 2025-03-18 ENCOUNTER — PATIENT MESSAGE (OUTPATIENT)
Dept: MEDICAL GROUP | Facility: PHYSICIAN GROUP | Age: 55
End: 2025-03-18

## 2025-03-18 DIAGNOSIS — N64.4 BREAST PAIN: ICD-10-CM

## 2025-03-18 DIAGNOSIS — Z12.31 ENCOUNTER FOR SCREENING MAMMOGRAM FOR MALIGNANT NEOPLASM OF BREAST: ICD-10-CM

## 2025-04-10 DIAGNOSIS — N64.4 BREAST PAIN: ICD-10-CM

## 2025-04-24 DIAGNOSIS — E11.65 TYPE 2 DIABETES MELLITUS WITH HYPERGLYCEMIA, WITHOUT LONG-TERM CURRENT USE OF INSULIN (HCC): ICD-10-CM

## 2025-04-24 NOTE — TELEPHONE ENCOUNTER
Received request via: Pharmacy    Was the patient seen in the last year in this department? Yes    Does the patient have an active prescription (recently filled or refills available) for medication(s) requested? No    Pharmacy Name: [facility]    Does the patient have [facility] Plus and need 100-day supply? (This applies to ALL medications) Patient does not have SCP

## 2025-04-28 RX ORDER — EMPAGLIFLOZIN 25 MG/1
1 TABLET, FILM COATED ORAL
Qty: 90 TABLET | Refills: 3 | Status: SHIPPED | OUTPATIENT
Start: 2025-04-28

## 2025-05-07 ENCOUNTER — OFFICE VISIT (OUTPATIENT)
Dept: MEDICAL GROUP | Facility: PHYSICIAN GROUP | Age: 55
End: 2025-05-07
Payer: COMMERCIAL

## 2025-05-07 VITALS
HEIGHT: 64 IN | HEART RATE: 86 BPM | SYSTOLIC BLOOD PRESSURE: 124 MMHG | DIASTOLIC BLOOD PRESSURE: 82 MMHG | WEIGHT: 206.8 LBS | OXYGEN SATURATION: 94 % | BODY MASS INDEX: 35.3 KG/M2 | TEMPERATURE: 97.2 F

## 2025-05-07 DIAGNOSIS — E78.5 HYPERLIPIDEMIA ASSOCIATED WITH TYPE 2 DIABETES MELLITUS (HCC): ICD-10-CM

## 2025-05-07 DIAGNOSIS — E11.65 TYPE 2 DIABETES MELLITUS WITH HYPERGLYCEMIA, WITHOUT LONG-TERM CURRENT USE OF INSULIN (HCC): ICD-10-CM

## 2025-05-07 DIAGNOSIS — E11.69 HYPERLIPIDEMIA ASSOCIATED WITH TYPE 2 DIABETES MELLITUS (HCC): ICD-10-CM

## 2025-05-07 LAB
HBA1C MFR BLD: 5.7 % (ref ?–5.8)
POCT INT CON NEG: NEGATIVE
POCT INT CON POS: POSITIVE

## 2025-05-07 PROCEDURE — 3079F DIAST BP 80-89 MM HG: CPT

## 2025-05-07 PROCEDURE — 83036 HEMOGLOBIN GLYCOSYLATED A1C: CPT

## 2025-05-07 PROCEDURE — 99214 OFFICE O/P EST MOD 30 MIN: CPT

## 2025-05-07 PROCEDURE — 3074F SYST BP LT 130 MM HG: CPT

## 2025-05-07 RX ORDER — SEMAGLUTIDE 2.68 MG/ML
2 INJECTION, SOLUTION SUBCUTANEOUS
Qty: 9 ML | Refills: 3 | Status: SHIPPED | OUTPATIENT
Start: 2025-05-07

## 2025-05-07 ASSESSMENT — FIBROSIS 4 INDEX: FIB4 SCORE: 1.03

## 2025-05-29 NOTE — PROGRESS NOTES
Verbal consent was acquired by the patient to use [employer] ambient listening note generation during this visit     Subjective:     HPI:   History of Present Illness  The patient, a 54-year-old female, presents for a 3-month follow-up regarding her type 2 diabetes mellitus.    In February, her semaglutide (Ozempic) dosage was increased to 1 mg weekly. Since then, her weight has increased from 196 to 206 pounds. She continues to take Jardiance 25 mg daily and metformin 1000 mg twice daily. The patient reports a decreased efficacy of Ozempic without experiencing any adverse effects. She expresses concern about her weight surpassing 200 pounds and is managing her diet by reducing carbohydrate intake and increasing protein consumption. She acknowledges the need for increased water intake and is making efforts to improve this aspect of her diet. She experiences occasional nausea, which she attributes to the increased dosage of Ozempic. Despite adhering to the 1 mg dosage of Ozempic for an extended period, she is considering a dosage increase. A previous reduction in metformin led to uncontrolled eating behaviors. She reports an increase in food intake but notes that she consumes smaller portions and feels disheartened by her inability to finish a plate. The patient is experiencing constipation and believes that increasing her water intake may alleviate this symptom.        Assessment & Plan:     Problem List Items Addressed This Visit       Hyperlipidemia associated with type 2 diabetes mellitus (HCC)    Relevant Medications    Semaglutide, 2 MG/DOSE, (OZEMPIC, 2 MG/DOSE,) 8 MG/3ML Solution Pen-injector     Other Visit Diagnoses         Type 2 diabetes mellitus with hyperglycemia, without long-term current use of insulin (HCC)        Relevant Medications    Semaglutide, 2 MG/DOSE, (OZEMPIC, 2 MG/DOSE,) 8 MG/3ML Solution Pen-injector    Other Relevant Orders    POCT  A1C (Completed)              Assessment & Plan  1.  Type 2 diabetes mellitus: A1c increased from 5.4 to 5.7. Weight increased from 196 to 206 pounds since last visit in 02/2025.  - Increase semaglutide to 2 mg weekly. Prescription sent to pharmacy.  - Continue Jardiance 25 mg daily and metformin 500 mg BID.  - Increase water intake to help with constipation.    Follow-up in 3-4 months with repeat A1c in office.      Health Maintenance: Orders placed as applicable to patient     Objective:     Exam:  Objective:  Vitals:    05/07/25 1357   BP: 124/82   Pulse: 86   Temp: 36.2 °C (97.2 °F)   SpO2: 94%     Physical Exam  Physical Exam    Constitutional:       Appearance: Normal appearance.   Eyes:      Extraocular Movements: Extraocular movements intact.   Pulmonary:      Effort: Pulmonary effort is normal.   Neurological:      General: No focal deficit present.      Mental Status: She is alert and oriented to person, place, and time.   Psychiatric:         Mood and Affect: Mood normal.         Behavior: Behavior normal.       Return in about 4 months (around 9/7/2025) for a1c in office .    Please note that this dictation was created using voice recognition software. I have made every reasonable attempt to correct obvious errors, but I expect that there are errors of grammar and possibly content that I did not discover before finalizing the note.

## 2025-06-11 ENCOUNTER — HOSPITAL ENCOUNTER (OUTPATIENT)
Facility: MEDICAL CENTER | Age: 55
End: 2025-06-11
Payer: COMMERCIAL

## 2025-06-11 DIAGNOSIS — N64.4 BREAST PAIN: ICD-10-CM

## 2025-06-11 PROCEDURE — G0279 TOMOSYNTHESIS, MAMMO: HCPCS

## 2025-06-16 ENCOUNTER — RESULTS FOLLOW-UP (OUTPATIENT)
Dept: MEDICAL GROUP | Facility: PHYSICIAN GROUP | Age: 55
End: 2025-06-16